# Patient Record
Sex: MALE | Race: WHITE | Employment: FULL TIME | URBAN - METROPOLITAN AREA
[De-identification: names, ages, dates, MRNs, and addresses within clinical notes are randomized per-mention and may not be internally consistent; named-entity substitution may affect disease eponyms.]

---

## 2017-03-08 DIAGNOSIS — E78.2 MIXED HYPERLIPIDEMIA: Primary | ICD-10-CM

## 2017-03-08 RX ORDER — ATORVASTATIN CALCIUM 20 MG/1
TABLET, FILM COATED ORAL
Qty: 30 TABLET | Refills: 1 | Status: SHIPPED | OUTPATIENT
Start: 2017-03-08 | End: 2017-04-21 | Stop reason: DRUGHIGH

## 2017-03-08 RX ORDER — FENOFIBRATE 160 MG/1
TABLET ORAL
Qty: 30 TABLET | Refills: 1 | Status: SHIPPED | OUTPATIENT
Start: 2017-03-08 | End: 2017-04-21 | Stop reason: SDUPTHER

## 2017-04-15 DIAGNOSIS — E78.2 MIXED HYPERLIPIDEMIA: Primary | ICD-10-CM

## 2017-04-15 DIAGNOSIS — I10 ESSENTIAL HYPERTENSION: ICD-10-CM

## 2017-04-15 DIAGNOSIS — E78.2 MIXED HYPERLIPIDEMIA: ICD-10-CM

## 2017-04-15 LAB
ALBUMIN SERPL-MCNC: 4.9 G/DL (ref 3.9–4.9)
ALP BLD-CCNC: 51 U/L (ref 35–104)
ALT SERPL-CCNC: 31 U/L (ref 0–41)
ANION GAP SERPL CALCULATED.3IONS-SCNC: 17 MEQ/L (ref 7–13)
AST SERPL-CCNC: 14 U/L (ref 0–40)
BASOPHILS ABSOLUTE: 0 K/UL (ref 0–0.2)
BASOPHILS RELATIVE PERCENT: 0.9 %
BILIRUB SERPL-MCNC: 0.3 MG/DL (ref 0–1.2)
BUN BLDV-MCNC: 14 MG/DL (ref 6–20)
CALCIUM SERPL-MCNC: 9.7 MG/DL (ref 8.6–10.2)
CHLORIDE BLD-SCNC: 102 MEQ/L (ref 98–107)
CHOLESTEROL, TOTAL: 312 MG/DL (ref 0–199)
CO2: 23 MEQ/L (ref 22–29)
CREAT SERPL-MCNC: 1.02 MG/DL (ref 0.7–1.2)
EOSINOPHILS ABSOLUTE: 0.2 K/UL (ref 0–0.7)
EOSINOPHILS RELATIVE PERCENT: 3.6 %
GFR AFRICAN AMERICAN: >60
GFR NON-AFRICAN AMERICAN: >60
GLOBULIN: 2.5 G/DL (ref 2.3–3.5)
GLUCOSE BLD-MCNC: 126 MG/DL (ref 74–109)
HCT VFR BLD CALC: 48 % (ref 42–52)
HDLC SERPL-MCNC: 37 MG/DL (ref 40–59)
HEMOGLOBIN: 16.1 G/DL (ref 14–18)
LDL CHOLESTEROL CALCULATED: ABNORMAL MG/DL (ref 0–129)
LYMPHOCYTES ABSOLUTE: 1.9 K/UL (ref 1–4.8)
LYMPHOCYTES RELATIVE PERCENT: 36.8 %
MCH RBC QN AUTO: 29.2 PG (ref 27–31.3)
MCHC RBC AUTO-ENTMCNC: 33.6 % (ref 33–37)
MCV RBC AUTO: 87 FL (ref 80–100)
MONOCYTES ABSOLUTE: 0.3 K/UL (ref 0.2–0.8)
MONOCYTES RELATIVE PERCENT: 6.4 %
NEUTROPHILS ABSOLUTE: 2.8 K/UL (ref 1.4–6.5)
NEUTROPHILS RELATIVE PERCENT: 52.3 %
PDW BLD-RTO: 13.8 % (ref 11.5–14.5)
PLATELET # BLD: 179 K/UL (ref 130–400)
POTASSIUM SERPL-SCNC: 4.5 MEQ/L (ref 3.5–5.1)
RBC # BLD: 5.52 M/UL (ref 4.7–6.1)
SODIUM BLD-SCNC: 142 MEQ/L (ref 132–144)
TOTAL PROTEIN: 7.4 G/DL (ref 6.4–8.1)
TRIGL SERPL-MCNC: 804 MG/DL (ref 0–200)
WBC # BLD: 5.3 K/UL (ref 4.8–10.8)

## 2017-04-21 ENCOUNTER — OFFICE VISIT (OUTPATIENT)
Dept: FAMILY MEDICINE CLINIC | Age: 52
End: 2017-04-21

## 2017-04-21 VITALS
WEIGHT: 247 LBS | DIASTOLIC BLOOD PRESSURE: 78 MMHG | SYSTOLIC BLOOD PRESSURE: 136 MMHG | HEART RATE: 66 BPM | RESPIRATION RATE: 18 BRPM | BODY MASS INDEX: 35.36 KG/M2 | HEIGHT: 70 IN | TEMPERATURE: 97.3 F

## 2017-04-21 DIAGNOSIS — K21.9 GASTROESOPHAGEAL REFLUX DISEASE, ESOPHAGITIS PRESENCE NOT SPECIFIED: ICD-10-CM

## 2017-04-21 DIAGNOSIS — E78.2 MIXED HYPERLIPIDEMIA: Primary | ICD-10-CM

## 2017-04-21 DIAGNOSIS — I10 ESSENTIAL HYPERTENSION: ICD-10-CM

## 2017-04-21 PROCEDURE — 99214 OFFICE O/P EST MOD 30 MIN: CPT | Performed by: FAMILY MEDICINE

## 2017-04-21 RX ORDER — FENOFIBRATE 160 MG/1
160 TABLET ORAL DAILY
Qty: 30 TABLET | Refills: 5 | Status: SHIPPED | OUTPATIENT
Start: 2017-04-21 | End: 2017-11-14 | Stop reason: SDUPTHER

## 2017-04-21 RX ORDER — ATORVASTATIN CALCIUM 40 MG/1
40 TABLET, FILM COATED ORAL DAILY
Qty: 30 TABLET | Refills: 5 | Status: SHIPPED | OUTPATIENT
Start: 2017-04-21 | End: 2017-11-14 | Stop reason: SDUPTHER

## 2017-04-21 RX ORDER — INSULIN LISPRO PROTAMIN/LISPRO 75-25/ML
80 VIAL (ML) SUBCUTANEOUS NIGHTLY
COMMUNITY
Start: 2017-03-20 | End: 2017-11-14 | Stop reason: DRUGHIGH

## 2017-04-21 RX ORDER — LISINOPRIL 10 MG/1
10 TABLET ORAL DAILY
Qty: 30 TABLET | Refills: 5 | Status: SHIPPED | OUTPATIENT
Start: 2017-04-21 | End: 2017-11-14 | Stop reason: SDUPTHER

## 2017-04-21 RX ORDER — OMEPRAZOLE 40 MG/1
40 CAPSULE, DELAYED RELEASE ORAL DAILY
Qty: 30 CAPSULE | Refills: 5 | Status: SHIPPED | OUTPATIENT
Start: 2017-04-21 | End: 2017-06-19 | Stop reason: CLARIF

## 2017-04-21 RX ORDER — DAPAGLIFLOZIN 10 MG/1
1 TABLET, FILM COATED ORAL DAILY
Refills: 3 | COMMUNITY
Start: 2017-03-06 | End: 2019-01-05 | Stop reason: SDUPTHER

## 2017-04-25 RX ORDER — ESOMEPRAZOLE MAGNESIUM 40 MG/1
40 CAPSULE, DELAYED RELEASE ORAL DAILY
Qty: 30 CAPSULE | Refills: 3 | Status: SHIPPED | OUTPATIENT
Start: 2017-04-25 | End: 2017-06-19 | Stop reason: CLARIF

## 2017-04-29 ENCOUNTER — OFFICE VISIT (OUTPATIENT)
Dept: FAMILY MEDICINE CLINIC | Age: 52
End: 2017-04-29

## 2017-04-29 VITALS
HEART RATE: 96 BPM | RESPIRATION RATE: 22 BRPM | WEIGHT: 241 LBS | BODY MASS INDEX: 34.5 KG/M2 | SYSTOLIC BLOOD PRESSURE: 120 MMHG | OXYGEN SATURATION: 98 % | HEIGHT: 70 IN | TEMPERATURE: 97.7 F | DIASTOLIC BLOOD PRESSURE: 78 MMHG

## 2017-04-29 DIAGNOSIS — H65.01 RIGHT ACUTE SEROUS OTITIS MEDIA, RECURRENCE NOT SPECIFIED: ICD-10-CM

## 2017-04-29 DIAGNOSIS — R09.81 NASAL CONGESTION: ICD-10-CM

## 2017-04-29 DIAGNOSIS — R09.82 POST-NASAL DRIP: ICD-10-CM

## 2017-04-29 DIAGNOSIS — R05.9 COUGH: ICD-10-CM

## 2017-04-29 DIAGNOSIS — H66.002 ACUTE SUPPURATIVE OTITIS MEDIA OF LEFT EAR WITHOUT SPONTANEOUS RUPTURE OF TYMPANIC MEMBRANE, RECURRENCE NOT SPECIFIED: Primary | ICD-10-CM

## 2017-04-29 PROCEDURE — 99213 OFFICE O/P EST LOW 20 MIN: CPT | Performed by: NURSE PRACTITIONER

## 2017-04-29 RX ORDER — AMOXICILLIN AND CLAVULANATE POTASSIUM 875; 125 MG/1; MG/1
1 TABLET, FILM COATED ORAL EVERY 12 HOURS
Qty: 20 TABLET | Refills: 0 | Status: SHIPPED | OUTPATIENT
Start: 2017-04-29 | End: 2017-05-09

## 2017-04-29 RX ORDER — FLUTICASONE PROPIONATE 50 MCG
2 SPRAY, SUSPENSION (ML) NASAL DAILY
Qty: 1 BOTTLE | Refills: 3 | Status: SHIPPED | OUTPATIENT
Start: 2017-04-29

## 2017-04-29 ASSESSMENT — ENCOUNTER SYMPTOMS
SORE THROAT: 0
SWOLLEN GLANDS: 0
ABDOMINAL PAIN: 0
WHEEZING: 0
SINUS PAIN: 1
NAUSEA: 0
VOMITING: 0
DIARRHEA: 0
RHINORRHEA: 1
COUGH: 1

## 2017-05-30 LAB
CHOLESTEROL, TOTAL: 167 MG/DL
CHOLESTEROL/HDL RATIO: 4.2
CREATININE, URINE: 260
HBA1C MFR BLD: 7.7 %
HCT VFR BLD CALC: 44.5 % (ref 41–53)
HDLC SERPL-MCNC: 40 MG/DL (ref 35–70)
HEMOGLOBIN: 14.4 G/DL (ref 13.5–17.5)
LDL CHOLESTEROL CALCULATED: 86 MG/DL (ref 0–160)
MICROALBUMIN/CREAT 24H UR: 14.2 MG/G{CREAT}
MICROALBUMIN/CREAT UR-RTO: 5.5
PLATELET # BLD: 171 K/ΜL
TRIGL SERPL-MCNC: 205 MG/DL
TSH SERPL DL<=0.05 MIU/L-ACNC: 2.29 UIU/ML
VLDLC SERPL CALC-MCNC: 41 MG/DL
WBC # BLD: 5 10^3/ML

## 2017-06-19 ENCOUNTER — OFFICE VISIT (OUTPATIENT)
Dept: FAMILY MEDICINE CLINIC | Age: 52
End: 2017-06-19

## 2017-06-19 VITALS
HEART RATE: 78 BPM | RESPIRATION RATE: 18 BRPM | DIASTOLIC BLOOD PRESSURE: 82 MMHG | HEIGHT: 70 IN | WEIGHT: 234.2 LBS | TEMPERATURE: 97.5 F | SYSTOLIC BLOOD PRESSURE: 122 MMHG | BODY MASS INDEX: 33.53 KG/M2

## 2017-06-19 DIAGNOSIS — E78.2 MIXED HYPERLIPIDEMIA: ICD-10-CM

## 2017-06-19 DIAGNOSIS — M77.11 LATERAL EPICONDYLITIS OF BOTH ELBOWS: Primary | ICD-10-CM

## 2017-06-19 DIAGNOSIS — M77.12 LATERAL EPICONDYLITIS OF BOTH ELBOWS: Primary | ICD-10-CM

## 2017-06-19 DIAGNOSIS — K21.9 GASTROESOPHAGEAL REFLUX DISEASE, ESOPHAGITIS PRESENCE NOT SPECIFIED: ICD-10-CM

## 2017-06-19 PROCEDURE — 99213 OFFICE O/P EST LOW 20 MIN: CPT | Performed by: FAMILY MEDICINE

## 2017-06-19 RX ORDER — LANSOPRAZOLE 30 MG/1
30 CAPSULE, DELAYED RELEASE ORAL DAILY
Qty: 30 CAPSULE | Refills: 5 | Status: SHIPPED | OUTPATIENT
Start: 2017-06-19 | End: 2017-07-06 | Stop reason: CLARIF

## 2017-06-19 RX ORDER — HYDROCODONE BITARTRATE AND ACETAMINOPHEN 5; 325 MG/1; MG/1
1-2 TABLET ORAL DAILY PRN
Qty: 60 TABLET | Refills: 0 | Status: SHIPPED | OUTPATIENT
Start: 2017-06-19 | End: 2017-11-08 | Stop reason: ALTCHOICE

## 2017-06-19 RX ORDER — ETODOLAC 400 MG/1
400 TABLET, FILM COATED ORAL 2 TIMES DAILY
Qty: 60 TABLET | Refills: 0 | Status: SHIPPED | OUTPATIENT
Start: 2017-06-19 | End: 2017-11-08 | Stop reason: ALTCHOICE

## 2017-07-06 ENCOUNTER — TELEPHONE (OUTPATIENT)
Dept: FAMILY MEDICINE CLINIC | Age: 52
End: 2017-07-06

## 2017-07-06 RX ORDER — DEXLANSOPRAZOLE 30 MG/1
30 CAPSULE, DELAYED RELEASE ORAL DAILY
Qty: 30 CAPSULE | Refills: 3 | Status: SHIPPED | OUTPATIENT
Start: 2017-07-06 | End: 2017-11-08 | Stop reason: ALTCHOICE

## 2017-11-08 ENCOUNTER — OFFICE VISIT (OUTPATIENT)
Dept: FAMILY MEDICINE CLINIC | Age: 52
End: 2017-11-08

## 2017-11-08 VITALS
RESPIRATION RATE: 16 BRPM | OXYGEN SATURATION: 93 % | SYSTOLIC BLOOD PRESSURE: 117 MMHG | BODY MASS INDEX: 33.87 KG/M2 | HEIGHT: 70 IN | HEART RATE: 91 BPM | WEIGHT: 236.6 LBS | DIASTOLIC BLOOD PRESSURE: 70 MMHG | TEMPERATURE: 97.1 F

## 2017-11-08 DIAGNOSIS — S39.012A STRAIN OF LUMBAR REGION, INITIAL ENCOUNTER: Primary | ICD-10-CM

## 2017-11-08 DIAGNOSIS — M54.31 SCIATICA OF RIGHT SIDE: ICD-10-CM

## 2017-11-08 DIAGNOSIS — M62.838 MUSCLE SPASM: ICD-10-CM

## 2017-11-08 PROCEDURE — 99213 OFFICE O/P EST LOW 20 MIN: CPT | Performed by: NURSE PRACTITIONER

## 2017-11-08 RX ORDER — CYCLOBENZAPRINE HCL 10 MG
10 TABLET ORAL 3 TIMES DAILY PRN
Qty: 45 TABLET | Refills: 0 | Status: SHIPPED | OUTPATIENT
Start: 2017-11-08 | End: 2018-02-16 | Stop reason: ALTCHOICE

## 2017-11-08 RX ORDER — PEN NEEDLE, DIABETIC 32GX 5/32"
NEEDLE, DISPOSABLE MISCELLANEOUS
Refills: 3 | COMMUNITY
Start: 2017-09-27 | End: 2018-10-17 | Stop reason: SDUPTHER

## 2017-11-08 RX ORDER — INSULIN GLARGINE 300 U/ML
INJECTION, SOLUTION SUBCUTANEOUS
Refills: 2 | COMMUNITY
Start: 2017-09-19 | End: 2017-11-14 | Stop reason: SDUPTHER

## 2017-11-08 RX ORDER — METHYLPREDNISOLONE 4 MG/1
TABLET ORAL
Qty: 1 KIT | Refills: 0 | Status: SHIPPED | OUTPATIENT
Start: 2017-11-08 | End: 2017-11-14 | Stop reason: ALTCHOICE

## 2017-11-08 NOTE — LETTER
[address] [address]  Phone: [phone]  Fax: [phone]    Hayder Judd NP        November 8, 2017     Patient: Sujey Mcdaniels   YOB: 1965   Date of Visit: 11/8/2017       To Whom it May Concern:    Linda Tubbs was seen in my clinic on 11/8/2017. He may return to work on 11/13/17 if his condition has resolved. If the condition has not improved he will be provided with additional documentation. If you have any questions or concerns, please don't hesitate to call.     Sincerely,             Hayder Judd NP

## 2017-11-08 NOTE — PATIENT INSTRUCTIONS
that reduce your pain. Try one of these positions when you lie down:  ¨ Lie on your back with your knees bent and supported by large pillows. ¨ Lie on the floor with your legs on the seat of a sofa or chair. [place] on your side with your knees and hips bent and a pillow between your legs. ¨ Lie on your stomach if it does not make pain worse. · Do not sit up in bed, and avoid soft couches and twisted positions. Bed rest can help relieve pain at first, but it delays healing. Avoid bed rest after the first day. · Change positions every 30 minutes. If you must sit for long periods of time, take breaks from sitting. Get up and walk around, or lie flat. · Try using a heating pad on a low or medium setting, for 15 to 20 minutes every 2 or 3 hours. Try a warm shower in place of one session with the heating pad. You can also buy single-use heat wraps that last up to 8 hours. You can also try ice or cold packs on your back for 10 to 20 minutes at a time, several times a day. (Put a thin cloth between the ice pack and your skin.) This reduces pain and makes it easier to be active and exercise. · Take pain medicines exactly as directed. ¨ If the doctor gave you a prescription medicine for pain, take it as prescribed. ¨ If you are not taking a prescription pain medicine, ask your doctor if you can take an over-the-counter medicine. When should you call for help? Call 911 anytime you think you may need emergency care. For example, call if:  · You are unable to move a leg at all. · You have back pain with severe belly pain. · You have symptoms of a heart attack. These may include:  ¨ Chest pain or pressure, or a strange feeling in the chest.  ¨ Sweating. ¨ Shortness of breath. ¨ Nausea or vomiting. ¨ Pain, pressure, or a strange feeling in the back, neck, jaw, or upper belly or in one or both shoulders or arms. ¨ Lightheadedness or sudden weakness. ¨ A fast or irregular heartbeat.   After you call 911, the  may tell you to chew 1 adult-strength or 2 to 4 low-dose aspirin. Wait for an ambulance. Do not try to drive yourself. Call your doctor now or seek immediate medical care if:  · You have new or worse symptoms in your arms, legs, chest, belly, or buttocks. Symptoms may include:  ¨ Numbness or tingling. ¨ Weakness. ¨ Pain. · You lose bladder or bowel control. · You have back pain and:  ¨ You have injured your back while lifting or doing some other activity. Call if the pain is severe, has not gone away after 1 or 2 days, and you cannot do your normal daily activities. ¨ You have had a back injury before that needed treatment. ¨ Your pain has lasted longer than 4 weeks. ¨ You have had weight loss you cannot explain. ¨ You are age 48 or older. ¨ You have cancer now or have had it before. Watch closely for changes in your health, and be sure to contact your doctor if you are not getting better as expected. Where can you learn more? Go to https://"".WhatsNexx. org and sign in to your BackupAgent account. Enter K091 in the Cliqset box to learn more about \"Back Pain, Emergency or Urgent Symptoms: Care Instructions. \"     If you do not have an account, please click on the \"Sign Up Now\" link. Current as of: March 20, 2017  Content Version: 11.3  © 1472-4466 NovaSys. Care instructions adapted under license by [facility] ([facility]). If you have questions about a medical condition or this instruction, always ask your healthcare professional. [place] [address] any warranty or liability for your use of this information. Patient Education        Back Care and Preventing Injuries: Care Instructions  Your Care Instructions  You can hurt your back doing many everyday activities: lifting a heavy box, bending down to garden, exercising at the gym, and even getting out of bed. But you can keep your back strong and healthy by doing some exercises.  You also can follow times.  ¨ Do pelvic tilts. Lie on your back with your knees bent. Tighten your stomach muscles. Pull your belly button (navel) in and up toward your ribs. You should feel like your back is pressing to the floor and your hips and pelvis are slightly lifting off the floor. Hold for 6 seconds while breathing smoothly. · Keep your core muscles strong. The muscles of your back, belly (abdomen), and buttocks support your spine. ¨ Pull in your belly, and imagine pulling your navel toward your spine. Hold this for 6 seconds, then relax. Remember to keep breathing normally as you tense your muscles. ¨ Do curl-ups. Always do them with your knees bent. Keep your low back on the floor, and curl your shoulders toward your knees using a smooth, slow motion. Keep your arms folded across your chest. If this bothers your neck, try putting your hands behind your neck (not your head), with your elbows spread apart. ¨ Lie on your back with your knees bent and your feet flat on the floor. Tighten your belly muscles, and then push with your feet and raise your buttocks up a few inches. Hold this position 6 seconds as you continue to breathe normally, then lower yourself slowly to the floor. Repeat 8 to 12 times. ¨ If you like group exercise, try Pilates or yoga. These classes have poses that strengthen the core muscles. Protect your back when you sit  · Place a small pillow, a rolled-up towel, or a lumbar roll in the curve of your back if you need extra support. · Sit in a chair that is low enough to let you place both feet flat on the floor with both knees nearly level with your hips. If your chair or desk is too high, use a foot rest to raise your knees. · When driving, keep your knees nearly level with your hips. Sit straight, and drive with both hands on the steering wheel. Your arms should be in a slightly bent position. · Try a kneeling chair, which helps tilt your hips forward.  This takes pressure off your lower back.  · Try sitting on an exercise ball. It can rock from side to side, which helps keep your back loose. Lift properly  · Squat down, bending at the hips and knees only. If you need to, put one knee to the floor and extend your other knee in front of you, bent at a right angle (half kneeling). · Press your chest straight forward. This helps keep your upper back straight while keeping a slight arch in your low back. · Hold the load as close to your body as possible, at the level of your navel. · Use your feet to change direction, taking small steps. · Lead with your hips as you change direction. Keep your shoulders in line with your hips as you move. Do not twist your body. · Set down your load carefully, squatting with your knees and hips only. When should you call for help? Watch closely for changes in your health, and be sure to contact your doctor if:  · You want more exercises to make your back and other core muscles stronger. Where can you learn more? Go to https://GetGoing.Nervana Systems. org and sign in to your RETC account. Enter S810 in the Laru Technologies box to learn more about \"Back Care and Preventing Injuries: Care Instructions. \"     If you do not have an account, please click on the \"Sign Up Now\" link. Current as of: March 21, 2017  Content Version: 11.3  © 0980-5521 Healthwise, Incorporated. Care instructions adapted under license by [facility] ([facility]). If you have questions about a medical condition or this instruction, always ask your healthcare professional. [place] [address] any warranty or liability for your use of this information. Patient Education        Back Stretches: Exercises  Your Care Instructions  Here are some examples of exercises for stretching your back. Start each exercise slowly. Ease off the exercise if you start to have pain.   Your doctor or physical therapist will tell you when you can start these exercises and which ones will work best for you. How to do the exercises  Overhead stretch    1. Stand comfortably with your feet shoulder-width apart. 2. Looking straight ahead, raise both arms over your head and reach toward the ceiling. Do not allow your head to tilt back. 3. Hold for 15 to 30 seconds, then lower your arms to your sides. 4. Repeat 2 to 4 times. Side stretch    1. Stand comfortably with your feet shoulder-width apart. 2. Raise one arm over your head, and then lean to the other side. 3. Slide your hand down your leg as you let the weight of your arm gently stretch your side muscles. Hold for 15 to 30 seconds. 4. Repeat 2 to 4 times on each side. Press-up    1. Lie on your stomach, supporting your body with your forearms. 2. Press your elbows down into the floor to raise your upper back. As you do this, relax your stomach muscles and allow your back to arch without using your back muscles. As your press up, do not let your hips or pelvis come off the floor. 3. Hold for 15 to 30 seconds, then relax. 4. Repeat 2 to 4 times. Relax and rest    1. Lie on your back with a rolled towel under your neck and a pillow under your knees. Extend your arms comfortably to your sides. 2. Relax and breathe normally. 3. Remain in this position for about 10 minutes. 4. If you can, do this 2 or 3 times each day. Follow-up care is a key part of your treatment and safety. Be sure to make and go to all appointments, and call your doctor if you are having problems. It's also a good idea to know your test results and keep a list of the medicines you take. Where can you learn more? Go to https://Zangopepiceweb.Krossover. org and sign in to your [employer] account. Enter N557 in the BioStable box to learn more about \"Back Stretches: Exercises. \"     If you do not have an account, please click on the \"Sign Up Now\" link. Current as of: March 21, 2017  Content Version: 11.3  © 5140-0853 ImpactMedia, Incorporated.  Care instructions whichever feels better on your lower back). 7. Keep your lower back pressed to the floor. Hold for at least 15 to 30 seconds. 8. Relax, and lower the knee to the starting position. 9. Repeat with the other leg. Repeat 2 to 4 times with each leg. 10. To get more stretch, put your other leg flat on the floor while pulling your knee to your chest.  Curl-ups    5. Lie on the floor on your back with your knees bent at a 90-degree angle. Your feet should be flat on the floor, about 12 inches from your buttocks. 6. Cross your arms over your chest. If this bothers your neck, try putting your hands behind your neck (not your head), with your elbows spread apart. 7. Slowly tighten your belly muscles and raise your shoulder blades off the floor. 8. Keep your head in line with your body, and do not press your chin to your chest.  9. Hold this position for 1 or 2 seconds, then slowly lower yourself back down to the floor. 10. Repeat 8 to 12 times. Pelvic tilt exercise    1. Lie on your back with your knees bent. 2. \"Brace\" your stomach. This means to tighten your muscles by pulling in and imagining your belly button moving toward your spine. You should feel like your back is pressing to the floor and your hips and pelvis are rocking back. 3. Hold for about 6 seconds while you breathe smoothly. 4. Repeat 8 to 12 times. Heel dig bridging    1. Lie on your back with both knees bent and your ankles bent so that only your heels are digging into the floor. Your knees should be bent about 90 degrees. 2. Then push your heels into the floor, squeeze your buttocks, and lift your hips off the floor until your shoulders, hips, and knees are all in a straight line. 3. Hold for about 6 seconds as you continue to breathe normally, and then slowly lower your hips back down to the floor and rest for up to 10 seconds. 4. Do 8 to 12 repetitions. Hamstring stretch in doorway    1.  Lie on your back in a doorway, with one leg through have an account, please click on the \"Sign Up Now\" link. Current as of: March 21, 2017  Content Version: 11.3  © 4794-4762 CodeSealer. Care instructions adapted under license by [facility] ([facility]). If you have questions about a medical condition or this instruction, always ask your healthcare professional. [address] any warranty or liability for your use of this information. Patient Education        Sciatica: Exercises  Your Care Instructions  Here are some examples of typical rehabilitation exercises for your condition. Start each exercise slowly. Ease off the exercise if you start to have pain. Your doctor or physical therapist will tell you when you can start these exercises and which ones will work best for you. When you are not being active, find a comfortable position for rest. Some people are comfortable on the floor or a medium-firm bed with a small pillow under their head and another under their knees. Some people prefer to lie on their side with a pillow between their knees. Don't stay in one position for too long. Take short walks (10 to 20 minutes) every 2 to 3 hours. Avoid slopes, hills, and stairs until you feel better. Walk only distances you can manage without pain, especially leg pain. How to do the exercises  Back stretches    15. Get down on your hands and knees on the floor. 16. Relax your head and allow it to droop. Round your back up toward the ceiling until you feel a nice stretch in your upper, middle, and lower back. Hold this stretch for as long as it feels comfortable, or about 15 to 30 seconds. 17. Return to the starting position with a flat back while you are on your hands and knees. 18. Let your back sway by pressing your stomach toward the floor. Lift your buttocks toward the ceiling. 19. Hold this position for 15 to 30 seconds. 20. Repeat 2 to 4 times. Follow-up care is a key part of your treatment and safety.  Be sure to make and go to all appointments, and call your doctor if you are having problems. It's also a good idea to know your test results and keep a list of the medicines you take. Where can you learn more? Go to https://WiLinxcecilyeb.Coinsetter. org and sign in to your Elasticsearch account. Enter K061 in the [facility] box to learn more about \"Sciatica: Exercises. \"     If you do not have an account, please click on the \"Sign Up Now\" link. Current as of: March 21, 2017  Content Version: 11.3  © 6372-3329 Survata, Incorporated. Care instructions adapted under license by [facility] ([facility]). If you have questions about a medical condition or this instruction, always ask your healthcare professional. [address] any warranty or liability for your use of this information.

## 2017-11-09 ENCOUNTER — TELEPHONE (OUTPATIENT)
Dept: FAMILY MEDICINE CLINIC | Age: 52
End: 2017-11-09

## 2017-11-09 ASSESSMENT — ENCOUNTER SYMPTOMS
EYE PAIN: 0
CONSTIPATION: 0
SWOLLEN GLANDS: 0
NAUSEA: 0
SHORTNESS OF BREATH: 0
ABDOMINAL PAIN: 0
DIARRHEA: 0
WHEEZING: 0
VISUAL CHANGE: 0

## 2017-11-10 NOTE — PROGRESS NOTES
Subjective  Yaritza Del Rosario, 50 y.o. male presents today with:  Chief Complaint   Patient presents with    Muscle Pain     Pt c/o muscle pain R side lower back pain goes down toward hip, pulled muscle from lifting, difficult to walk, x5days, taking ibuprofen       Muscle Pain   This is a new problem. The current episode started in the past 7 days. The problem occurs constantly. The problem has been gradually worsening since onset. The pain occurs in the context of recent physical stress (Patient was helping his children move and was carrying boxes and furniture). The pain is present in the lower back and left hip. The pain is severe. The symptoms are aggravated by any movement. Associated symptoms include stiffness and weakness. Pertinent negatives include no abdominal pain, chest pain, constipation, diarrhea, eye pain, fever, headaches, joint swelling, nausea, rash, sensory change, shortness of breath, swollen glands, urinary symptoms, visual change or wheezing. Past treatments include acetaminophen, OTC NSAID, rest, cold pack and heat pack. The treatment provided no relief. There is no swelling present. Objective    Vitals:    11/08/17 1811   BP: 117/70   Site: Left Arm   Position: Sitting   Cuff Size: Medium Adult   Pulse: 91   Resp: 16   Temp: 97.1 °F (36.2 °C)   TempSrc: Temporal   SpO2: 93%   Weight: 236 lb 9.6 oz (107.3 kg)   Height: 5' 10\" (1.778 m)       Physical Exam   Constitutional: He is oriented to person, place, and time. He appears well-developed and well-nourished. HENT:   Head: Normocephalic and atraumatic. Eyes: Conjunctivae and EOM are normal.   Neck: Normal range of motion. Pulmonary/Chest: Effort normal.   Musculoskeletal:        Lumbar back: He exhibits decreased range of motion (patient isn't able to bend more than 30 degrees without a great deal of pain), tenderness, pain and spasm (heavy spasm noted).         Back:    When walking patient has an abnormal gait and shoulders are angled and body put in abnormal position for patient to walk pain free. Neurological: He is alert and oriented to person, place, and time. Skin: Skin is warm and dry. Psychiatric: He has a normal mood and affect. Assessment & Plan   1. Strain of lumbar region, initial encounter  methylPREDNISolone (MEDROL, EMILE,) 4 MG tablet    cyclobenzaprine (FLEXERIL) 10 MG tablet    traMADol-acetaminophen (ULTRACET) 37.5-325 MG per tablet    Symptomatic, RXs given. Pt to have close follow up. Parameters on when to call and when to ER given. If no improve will need XR   2. Sciatica of right side  methylPREDNISolone (MEDROL, EMILE,) 4 MG tablet    cyclobenzaprine (FLEXERIL) 10 MG tablet    traMADol-acetaminophen (ULTRACET) 37.5-325 MG per tablet    see 1   3. Muscle spasm  methylPREDNISolone (MEDROL, EMILE,) 4 MG tablet    cyclobenzaprine (FLEXERIL) 10 MG tablet    traMADol-acetaminophen (ULTRACET) 37.5-325 MG per tablet    see 1       Return in about 1 week (around 11/15/2017) for back strain. Reviewed with the patient: current clinical status, medications, activities and diet. Side effects, adverse effects of the medication prescribed today, as well as treatment plan/ rationale and result expectations have been discussed with the patient who expresses understanding and desires to proceed. Close follow up to evaluate treatment results and for coordination of care. I have reviewed the patient's medical history in detail and updated the computerized patient record.     Ramin Young NP

## 2017-11-13 ENCOUNTER — TELEPHONE (OUTPATIENT)
Dept: FAMILY MEDICINE CLINIC | Age: 52
End: 2017-11-13

## 2017-11-13 NOTE — TELEPHONE ENCOUNTER
PATIENT CALLED STATING HE SEEN NP Thomas Alves ON 11/8/17 AND WAS GIVEN A WORK EXCUSE TO RETURN TO WORK TODAY, BUT PATIENT STATES HE WAS NOT ABLE TO RETURN TODAY AND IS REQUESTING A NEW NOTE STATES HE CAN RETURN ON 11/14/17.  PLEASE ADVISE

## 2017-11-14 ENCOUNTER — OFFICE VISIT (OUTPATIENT)
Dept: FAMILY MEDICINE CLINIC | Age: 52
End: 2017-11-14

## 2017-11-14 VITALS
RESPIRATION RATE: 18 BRPM | BODY MASS INDEX: 33.82 KG/M2 | TEMPERATURE: 97.3 F | HEIGHT: 70 IN | HEART RATE: 76 BPM | SYSTOLIC BLOOD PRESSURE: 122 MMHG | DIASTOLIC BLOOD PRESSURE: 64 MMHG | WEIGHT: 236.2 LBS

## 2017-11-14 DIAGNOSIS — E78.2 MIXED HYPERLIPIDEMIA: ICD-10-CM

## 2017-11-14 DIAGNOSIS — M54.50 ACUTE BILATERAL LOW BACK PAIN WITHOUT SCIATICA: ICD-10-CM

## 2017-11-14 DIAGNOSIS — S39.012D STRAIN OF LUMBAR REGION, SUBSEQUENT ENCOUNTER: ICD-10-CM

## 2017-11-14 DIAGNOSIS — E11.9 TYPE 2 DIABETES MELLITUS WITHOUT COMPLICATION, WITHOUT LONG-TERM CURRENT USE OF INSULIN (HCC): ICD-10-CM

## 2017-11-14 DIAGNOSIS — I10 ESSENTIAL HYPERTENSION: Primary | ICD-10-CM

## 2017-11-14 DIAGNOSIS — K21.9 GASTROESOPHAGEAL REFLUX DISEASE, ESOPHAGITIS PRESENCE NOT SPECIFIED: ICD-10-CM

## 2017-11-14 PROCEDURE — 99214 OFFICE O/P EST MOD 30 MIN: CPT | Performed by: FAMILY MEDICINE

## 2017-11-14 RX ORDER — INSULIN GLARGINE 300 U/ML
40 INJECTION, SOLUTION SUBCUTANEOUS EVERY MORNING
COMMUNITY
Start: 2017-11-14 | End: 2018-02-16 | Stop reason: CLARIF

## 2017-11-14 RX ORDER — INSULIN LISPRO PROTAMIN/LISPRO 75-25/ML
50 VIAL (ML) SUBCUTANEOUS
Qty: 1 VIAL | Status: SHIPPED | COMMUNITY
Start: 2017-11-14 | End: 2018-03-27 | Stop reason: DRUGHIGH

## 2017-11-14 RX ORDER — FENOFIBRATE 160 MG/1
160 TABLET ORAL DAILY
Qty: 30 TABLET | Refills: 5 | Status: SHIPPED | OUTPATIENT
Start: 2017-11-14 | End: 2018-06-26 | Stop reason: SDUPTHER

## 2017-11-14 RX ORDER — ATORVASTATIN CALCIUM 40 MG/1
40 TABLET, FILM COATED ORAL DAILY
Qty: 30 TABLET | Refills: 5 | Status: SHIPPED | OUTPATIENT
Start: 2017-11-14 | End: 2018-06-26 | Stop reason: SDUPTHER

## 2017-11-14 RX ORDER — LISINOPRIL 10 MG/1
10 TABLET ORAL DAILY
Qty: 30 TABLET | Refills: 5 | Status: SHIPPED | OUTPATIENT
Start: 2017-11-14 | End: 2018-06-08 | Stop reason: SDUPTHER

## 2017-11-14 ASSESSMENT — PATIENT HEALTH QUESTIONNAIRE - PHQ9
2. FEELING DOWN, DEPRESSED OR HOPELESS: 0
SUM OF ALL RESPONSES TO PHQ QUESTIONS 1-9: 0
SUM OF ALL RESPONSES TO PHQ9 QUESTIONS 1 & 2: 0
1. LITTLE INTEREST OR PLEASURE IN DOING THINGS: 0

## 2017-11-14 NOTE — PROGRESS NOTES
wheezing  Cardiovascular ROS: no chest pain or dyspnea on exertion  Gastrointestinal ROS: no abdominal pain, change in bowel habits, or black or bloody stools  Genito-Urinary ROS: no dysuria, trouble voiding, or hematuria  Musculoskeletal ROS: as noted in HPI  Neurological ROS: negative for - dizziness, gait disturbance, headaches, impaired coordination/balance, numbness/tingling, tremors or visual changes  Dermatological ROS: negative for - dry skin, lumps, pruritus or rash            Blood pressure 122/64, pulse 76, temperature 97.3 °F (36.3 °C), temperature source Temporal, resp. rate 18, height 5' 10\" (1.778 m), weight 236 lb 3.2 oz (107.1 kg).     Physical Examination: General appearance - alert, well appearing, and in no distress  Mental status - alert, oriented to person, place, and time  Eyes - pupils equal and reactive, extraocular eye movements intact  Ears - bilateral TM's and external ear canals normal  Mouth - mucous membranes moist, pharynx normal without lesions  Neck - supple, no significant adenopathy  Lymphatics - no palpable lymphadenopathy, no hepatosplenomegaly  Chest - clear to auscultation, no wheezes, rales or rhonchi, symmetric air entry  Heart - normal rate, regular rhythm, normal S1, S2, no murmurs, rubs, clicks or gallops  Abdomen - soft, nontender, nondistended, no masses or organomegaly  Neurological - alert, oriented, normal speech, no focal findings or movement disorder noted  Musculoskeletal - no joint tenderness, deformity or swelling        Orders Only on 06/19/2017   Component Date Value Ref Range Status    Cholesterol, Total 05/30/2017 167  mg/dL Final    HDL 05/30/2017 40  35 - 70 mg/dL Final    LDL Calculated 05/30/2017 86  0 - 160 mg/dL Final    Triglycerides 05/30/2017 205  mg/dL Final    Chol/HDL Ratio 05/30/2017 4.2   Final    VLDL 05/30/2017 41  mg/dL Final    WBC 05/30/2017 5.0  10^3/mL Final    Hemoglobin 05/30/2017 14.4  13.5 - 17.5 g/dL Final    Hematocrit 05/30/2017 44.5  41 - 53 % Final    Platelets 22/55/6705 171  K/µL Final    Hemoglobin A1C 05/30/2017 7.7  % Final    TSH 05/30/2017 2.29  uIU/mL Final    Microalb Creat Ratio 05/30/2017 5.5   Final    Microalb, Ur 05/30/2017 14.2   Final    Creatinine, Urine 05/30/2017 260   Final            Encounter Diagnoses   Name Primary?     Mixed hyperlipidemia     Essential hypertension Yes    Gastroesophageal reflux disease, esophagitis presence not specified     Type 2 diabetes mellitus without complication, without long-term current use of insulin (HCC)     Acute bilateral low back pain without sciatica     Strain of lumbar region, subsequent encounter             Plan:     Orders Placed This Encounter   Procedures    Comprehensive Metabolic Panel     Standing Status:   Future     Standing Expiration Date:   11/14/2018    Lipid Panel     Standing Status:   Future     Standing Expiration Date:   11/14/2018     Order Specific Question:   Is Patient Fasting?/# of Hours     Answer:   8-10HRS    CBC Auto Differential     Standing Status:   Future     Standing Expiration Date:   11/14/2018    Hemoglobin A1C     Standing Status:   Future     Standing Expiration Date:   11/14/2018     Outpatient Encounter Prescriptions as of 11/14/2017   Medication Sig Dispense Refill    atorvastatin (LIPITOR) 40 MG tablet Take 1 tablet by mouth daily 30 tablet 5    lisinopril (PRINIVIL;ZESTRIL) 10 MG tablet Take 1 tablet by mouth daily 30 tablet 5    metoprolol tartrate (LOPRESSOR) 25 MG tablet Take 1 tablet by mouth 2 times daily 60 tablet 5    fenofibrate 160 MG tablet Take 1 tablet by mouth daily 30 tablet 5    HUMALOG MIX 75/25 (75-25) 100 UNIT/ML SUSP injection vial 50 Units 1 vial     TOUJEO SOLOSTAR 300 UNIT/ML injection pen Inject 40 Units into the skin every morning      BD PEN NEEDLE CARLA U/F 32G X 4 MM MISC TEST THREE TIMES DAILY  3    cyclobenzaprine (FLEXERIL) 10 MG tablet Take 1 tablet by mouth 3 times daily aware of his LDL target goal given his cardiovascular risk analysis. I have discussed the appropriate diet. The need for lifelong compliance in order to reduce risk is stressed. A regular exercise program is recommended to help achieve and maintain normal body weight, fitness and improve lipid balance. A written copy of a low fat, low cholesterol diet has been given to the patient.

## 2017-11-25 DIAGNOSIS — E78.2 MIXED HYPERLIPIDEMIA: ICD-10-CM

## 2017-11-25 DIAGNOSIS — E11.9 TYPE 2 DIABETES MELLITUS WITHOUT COMPLICATION, WITHOUT LONG-TERM CURRENT USE OF INSULIN (HCC): ICD-10-CM

## 2017-11-25 DIAGNOSIS — I10 ESSENTIAL HYPERTENSION: ICD-10-CM

## 2017-11-25 LAB
ALBUMIN SERPL-MCNC: 4.9 G/DL (ref 3.9–4.9)
ALP BLD-CCNC: 46 U/L (ref 35–104)
ALT SERPL-CCNC: 34 U/L (ref 0–41)
ANION GAP SERPL CALCULATED.3IONS-SCNC: 16 MEQ/L (ref 7–13)
AST SERPL-CCNC: 17 U/L (ref 0–40)
BASOPHILS ABSOLUTE: 0 K/UL (ref 0–0.2)
BASOPHILS RELATIVE PERCENT: 0.7 %
BILIRUB SERPL-MCNC: 0.3 MG/DL (ref 0–1.2)
BUN BLDV-MCNC: 16 MG/DL (ref 6–20)
CALCIUM SERPL-MCNC: 9.5 MG/DL (ref 8.6–10.2)
CHLORIDE BLD-SCNC: 98 MEQ/L (ref 98–107)
CHOLESTEROL, TOTAL: 215 MG/DL (ref 0–199)
CO2: 25 MEQ/L (ref 22–29)
CREAT SERPL-MCNC: 0.92 MG/DL (ref 0.7–1.2)
EOSINOPHILS ABSOLUTE: 0.1 K/UL (ref 0–0.7)
EOSINOPHILS RELATIVE PERCENT: 2.7 %
GFR AFRICAN AMERICAN: >60
GFR NON-AFRICAN AMERICAN: >60
GLOBULIN: 2.3 G/DL (ref 2.3–3.5)
GLUCOSE BLD-MCNC: 144 MG/DL (ref 74–109)
HBA1C MFR BLD: 7.4 % (ref 4.8–5.9)
HCT VFR BLD CALC: 47.5 % (ref 42–52)
HDLC SERPL-MCNC: 39 MG/DL (ref 40–59)
HEMOGLOBIN: 16 G/DL (ref 14–18)
LDL CHOLESTEROL CALCULATED: ABNORMAL MG/DL (ref 0–129)
LYMPHOCYTES ABSOLUTE: 1.7 K/UL (ref 1–4.8)
LYMPHOCYTES RELATIVE PERCENT: 35.2 %
MCH RBC QN AUTO: 30 PG (ref 27–31.3)
MCHC RBC AUTO-ENTMCNC: 33.8 % (ref 33–37)
MCV RBC AUTO: 88.9 FL (ref 80–100)
MONOCYTES ABSOLUTE: 0.4 K/UL (ref 0.2–0.8)
MONOCYTES RELATIVE PERCENT: 7.4 %
NEUTROPHILS ABSOLUTE: 2.6 K/UL (ref 1.4–6.5)
NEUTROPHILS RELATIVE PERCENT: 54 %
PDW BLD-RTO: 13.5 % (ref 11.5–14.5)
PLATELET # BLD: 183 K/UL (ref 130–400)
POTASSIUM SERPL-SCNC: 4.6 MEQ/L (ref 3.5–5.1)
RBC # BLD: 5.35 M/UL (ref 4.7–6.1)
SODIUM BLD-SCNC: 139 MEQ/L (ref 132–144)
TOTAL PROTEIN: 7.2 G/DL (ref 6.4–8.1)
TRIGL SERPL-MCNC: 421 MG/DL (ref 0–200)
WBC # BLD: 4.8 K/UL (ref 4.8–10.8)

## 2017-11-28 RX ORDER — GLIPIZIDE 10 MG/1
TABLET ORAL
Qty: 30 TABLET | Refills: 0 | Status: SHIPPED | OUTPATIENT
Start: 2017-11-28 | End: 2018-02-16 | Stop reason: SDUPTHER

## 2017-12-29 ENCOUNTER — OFFICE VISIT (OUTPATIENT)
Dept: FAMILY MEDICINE CLINIC | Age: 52
End: 2017-12-29

## 2017-12-29 VITALS
WEIGHT: 242 LBS | HEART RATE: 70 BPM | TEMPERATURE: 98.4 F | BODY MASS INDEX: 34.72 KG/M2 | RESPIRATION RATE: 14 BRPM | SYSTOLIC BLOOD PRESSURE: 126 MMHG | DIASTOLIC BLOOD PRESSURE: 86 MMHG

## 2017-12-29 DIAGNOSIS — R14.0 ABDOMINAL BLOATING: ICD-10-CM

## 2017-12-29 DIAGNOSIS — R14.3 FLATULENCE: Primary | ICD-10-CM

## 2017-12-29 PROCEDURE — 99212 OFFICE O/P EST SF 10 MIN: CPT | Performed by: NURSE PRACTITIONER

## 2017-12-29 ASSESSMENT — ENCOUNTER SYMPTOMS
CONSTIPATION: 0
RESPIRATORY NEGATIVE: 1
BLOOD IN STOOL: 0
ABDOMINAL DISTENTION: 1
VOMITING: 0
NAUSEA: 0
DIARRHEA: 0
ABDOMINAL PAIN: 0

## 2017-12-29 NOTE — PROGRESS NOTES
Patient present today states that at night he has gas and his stomach growls. No abdominal discomfort or pain.

## 2017-12-29 NOTE — PROGRESS NOTES
Dispense Refill    glipiZIDE (GLUCOTROL) 10 MG tablet TAKE ONE TABLET AT NIGHT BY MOUTH 30 tablet 0    metFORMIN (GLUCOPHAGE) 500 MG tablet Take 2 tablets by mouth 2 times daily (with meals) 120 tablet 0    atorvastatin (LIPITOR) 40 MG tablet Take 1 tablet by mouth daily 30 tablet 5    lisinopril (PRINIVIL;ZESTRIL) 10 MG tablet Take 1 tablet by mouth daily 30 tablet 5    metoprolol tartrate (LOPRESSOR) 25 MG tablet Take 1 tablet by mouth 2 times daily 60 tablet 5    fenofibrate 160 MG tablet Take 1 tablet by mouth daily 30 tablet 5    HUMALOG MIX 75/25 (75-25) 100 UNIT/ML SUSP injection vial 50 Units 1 vial     TOUJEO SOLOSTAR 300 UNIT/ML injection pen Inject 40 Units into the skin every morning      BD PEN NEEDLE CARLA U/F 32G X 4 MM MISC TEST THREE TIMES DAILY  3    cyclobenzaprine (FLEXERIL) 10 MG tablet Take 1 tablet by mouth 3 times daily as needed for Muscle spasms 45 tablet 0    fluticasone (FLONASE) 50 MCG/ACT nasal spray 2 sprays by Nasal route daily 1 Bottle 3    FARXIGA 10 MG tablet Take 1 tablet by mouth daily  3    nitroGLYCERIN (NITROSTAT) 0.4 MG SL tablet Place 0.4 mg under the tongue as needed      ONE TOUCH ULTRA TEST strip Test tid as directed      aspirin 81 MG tablet Take 81 mg by mouth daily.  polyethylene glycol (MIRALAX) powder Take 17 g by mouth daily. 1 Bottle 0     No current facility-administered medications on file prior to visit. Allergies:  Review of patient's allergies indicates no known allergies. Review of Systems   Constitutional: Negative for appetite change, chills, diaphoresis, fatigue, fever and unexpected weight change. Respiratory: Negative. Gastrointestinal: Positive for abdominal distention. Negative for abdominal pain, blood in stool, constipation, diarrhea, nausea and vomiting. Genitourinary: Negative. Musculoskeletal: Negative.         Objective:   /86 (Site: Left Arm, Position: Sitting, Cuff Size: Large Adult)   Pulse 70 Temp 98.4 °F (36.9 °C) (Temporal)   Resp 14   Wt 242 lb (109.8 kg)   BMI 34.72 kg/m²     Physical Exam   Constitutional: He is oriented to person, place, and time. He appears well-developed and well-nourished. Cardiovascular: Normal rate. Pulmonary/Chest: Effort normal.   Abdominal: Soft. Bowel sounds are increased. There is no tenderness. There is no rigidity, no rebound and no guarding. Musculoskeletal: He exhibits no edema. Neurological: He is alert and oriented to person, place, and time. Skin: Skin is warm and dry. Assessment:     1. Flatulence     2. Abdominal bloating           Plan:      No orders of the defined types were placed in this encounter. No orders of the defined types were placed in this encounter. Discussed that there are many causes for increased gas and bloating, such as, diet, lack of exercise, bacterial overgrowth of intestines, IBS. Recommend he avoid gassy foods, such as, cabbage, onions, cruciferous veggies. Avoid dairy. Continue beano/gas-x. Start on probiotic, such as, culturelle. Give me a call in a week and let me know how he is doing. If still having symptoms or worsening, may do some imaging or start on antibiotic for bacterial overgrowth. Side effects, adverse effects of the medication prescribed today, as well as treatment plan and result expectations have been discussed with the patient who expresses understanding and desires to proceed.     Close follow up to evaluate treatment results and for coordination of care. I have reviewed the patient's medical history in detail and updated the computerized patient record. Return in about 1 week (around 1/5/2018) for f/u if not getting better.     Loretta Ledezma NP

## 2018-01-05 ENCOUNTER — TELEPHONE (OUTPATIENT)
Dept: FAMILY MEDICINE CLINIC | Age: 53
End: 2018-01-05

## 2018-01-05 RX ORDER — DICYCLOMINE HYDROCHLORIDE 10 MG/1
10 CAPSULE ORAL
Qty: 120 CAPSULE | Refills: 3 | Status: SHIPPED | OUTPATIENT
Start: 2018-01-05 | End: 2018-01-18 | Stop reason: ALTCHOICE

## 2018-01-18 ENCOUNTER — OFFICE VISIT (OUTPATIENT)
Dept: FAMILY MEDICINE CLINIC | Age: 53
End: 2018-01-18

## 2018-01-18 VITALS
DIASTOLIC BLOOD PRESSURE: 84 MMHG | HEART RATE: 92 BPM | SYSTOLIC BLOOD PRESSURE: 138 MMHG | HEIGHT: 70 IN | RESPIRATION RATE: 16 BRPM | TEMPERATURE: 97.2 F | BODY MASS INDEX: 34.47 KG/M2 | WEIGHT: 240.8 LBS

## 2018-01-18 DIAGNOSIS — E78.2 MIXED HYPERLIPIDEMIA: ICD-10-CM

## 2018-01-18 DIAGNOSIS — B96.89 ACUTE BACTERIAL SINUSITIS: ICD-10-CM

## 2018-01-18 DIAGNOSIS — J01.90 ACUTE BACTERIAL SINUSITIS: ICD-10-CM

## 2018-01-18 DIAGNOSIS — K58.9 IRRITABLE BOWEL SYNDROME WITHOUT DIARRHEA: ICD-10-CM

## 2018-01-18 DIAGNOSIS — I10 ESSENTIAL HYPERTENSION: ICD-10-CM

## 2018-01-18 DIAGNOSIS — R05.9 COUGH: ICD-10-CM

## 2018-01-18 DIAGNOSIS — R19.04 LLQ ABDOMINAL MASS: ICD-10-CM

## 2018-01-18 DIAGNOSIS — R10.84 GENERALIZED ABDOMINAL PAIN: Primary | ICD-10-CM

## 2018-01-18 PROCEDURE — 99214 OFFICE O/P EST MOD 30 MIN: CPT | Performed by: FAMILY MEDICINE

## 2018-01-18 RX ORDER — HYOSCYAMINE SULFATE EXTENDED-RELEASE 0.38 MG/1
375 TABLET ORAL EVERY 12 HOURS PRN
Qty: 60 TABLET | Refills: 0 | Status: SHIPPED | OUTPATIENT
Start: 2018-01-18 | End: 2018-02-22 | Stop reason: SDUPTHER

## 2018-01-18 RX ORDER — CEFUROXIME AXETIL 250 MG/1
250 TABLET ORAL 2 TIMES DAILY
Qty: 20 TABLET | Refills: 0 | Status: SHIPPED | OUTPATIENT
Start: 2018-01-18 | End: 2018-01-28

## 2018-02-05 ENCOUNTER — HOSPITAL ENCOUNTER (OUTPATIENT)
Dept: CT IMAGING | Age: 53
Discharge: HOME | End: 2018-02-07
Payer: COMMERCIAL

## 2018-02-05 VITALS — DIASTOLIC BLOOD PRESSURE: 87 MMHG | SYSTOLIC BLOOD PRESSURE: 132 MMHG | HEART RATE: 88 BPM

## 2018-02-05 DIAGNOSIS — R19.04 LLQ ABDOMINAL MASS: ICD-10-CM

## 2018-02-05 PROCEDURE — 2500000003 HC RX 250 WO HCPCS: Performed by: FAMILY MEDICINE

## 2018-02-05 PROCEDURE — 74177 CT ABD & PELVIS W/CONTRAST: CPT

## 2018-02-05 PROCEDURE — 6360000004 HC RX CONTRAST MEDICATION: Performed by: FAMILY MEDICINE

## 2018-02-05 RX ADMIN — IOVERSOL 100 ML: 678 INJECTION INTRA-ARTERIAL; INTRAVENOUS at 14:23

## 2018-02-05 RX ADMIN — BARIUM SULFATE 450 ML: 21 SUSPENSION ORAL at 13:20

## 2018-02-16 ENCOUNTER — OFFICE VISIT (OUTPATIENT)
Dept: FAMILY MEDICINE CLINIC | Age: 53
End: 2018-02-16
Payer: COMMERCIAL

## 2018-02-16 VITALS
RESPIRATION RATE: 18 BRPM | SYSTOLIC BLOOD PRESSURE: 118 MMHG | DIASTOLIC BLOOD PRESSURE: 60 MMHG | HEIGHT: 70 IN | TEMPERATURE: 97.5 F | BODY MASS INDEX: 34.44 KG/M2 | HEART RATE: 80 BPM | WEIGHT: 240.6 LBS

## 2018-02-16 DIAGNOSIS — E78.2 MIXED HYPERLIPIDEMIA: ICD-10-CM

## 2018-02-16 DIAGNOSIS — K58.0 IRRITABLE BOWEL SYNDROME WITH DIARRHEA: ICD-10-CM

## 2018-02-16 DIAGNOSIS — K21.9 GASTROESOPHAGEAL REFLUX DISEASE, ESOPHAGITIS PRESENCE NOT SPECIFIED: ICD-10-CM

## 2018-02-16 DIAGNOSIS — K76.0 HEPATIC STEATOSIS: ICD-10-CM

## 2018-02-16 DIAGNOSIS — E11.9 TYPE 2 DIABETES MELLITUS WITHOUT COMPLICATION, WITH LONG-TERM CURRENT USE OF INSULIN (HCC): Primary | ICD-10-CM

## 2018-02-16 DIAGNOSIS — I10 ESSENTIAL HYPERTENSION: ICD-10-CM

## 2018-02-16 DIAGNOSIS — Z79.4 TYPE 2 DIABETES MELLITUS WITHOUT COMPLICATION, WITH LONG-TERM CURRENT USE OF INSULIN (HCC): Primary | ICD-10-CM

## 2018-02-16 PROCEDURE — 99214 OFFICE O/P EST MOD 30 MIN: CPT | Performed by: FAMILY MEDICINE

## 2018-02-16 RX ORDER — GLIPIZIDE 10 MG/1
TABLET ORAL
Qty: 60 TABLET | Refills: 2 | Status: SHIPPED | OUTPATIENT
Start: 2018-02-16 | End: 2018-06-26 | Stop reason: SDUPTHER

## 2018-02-16 NOTE — PATIENT INSTRUCTIONS
this information. Patient Education        Type 2 Diabetes: Care Instructions  Your Care Instructions    Type 2 diabetes is a disease that develops when the body's tissues cannot use insulin properly. Over time, the pancreas cannot make enough insulin. Insulin is a hormone that helps the body's cells use sugar (glucose) for energy. It also helps the body store extra sugar in muscle, fat, and liver cells. Without insulin, the sugar cannot get into the cells to do its work. It stays in the blood instead. This can cause high blood sugar levels. A person has diabetes when the blood sugar stays too high too much of the time. Over time, diabetes can lead to diseases of the heart, blood vessels, nerves, kidneys, and eyes. You may be able to control your blood sugar by losing weight, eating a healthy diet, and getting daily exercise. You may also have to take insulin or other diabetes medicine. Follow-up care is a key part of your treatment and safety. Be sure to make and go to all appointments. Call your doctor if you are having problems. It's also a good idea to know your test results and keep a list of the medicines you take. How can you care for yourself at home? · Keep your blood sugar at a target level (which you set with your doctor). ¨ Eat a good diet that spreads carbohydrate throughout the day. Carbohydrate-the body's main source of fuel-affects blood sugar more than any other nutrient. Carbohydrate is in fruits, vegetables, milk, and yogurt. It also is in breads, cereals, vegetables such as potatoes and corn, and sugary foods such as candy and cakes. ¨ Aim for 30 minutes of exercise on most, preferably all, days of the week. Walking is a good choice. You also may want to do other activities, such as running, swimming, cycling, or playing tennis or team sports. If your doctor says it's okay, do muscle-strengthening exercises at least 2 times a week. ¨ Take your medicines exactly as prescribed.  Call is a good choice. You also may want to do other activities, such as running, swimming, cycling, or playing tennis or team sports. · Limit alcohol, or do not drink. Alcohol can damage the liver and cause health problems. When should you call for help? Call 911 anytime you think you may need emergency care. For example, call if:  ? · You have trouble breathing. ? · You vomit blood or what looks like coffee grounds. ?Call your doctor now or seek immediate medical care if:  ? · You feel very sleepy or confused. ? · You have new or worse belly pain. ? · You have a fever. ? · There is a new or increasing yellow tint to your skin or the whites of your eyes. ? · You have any abnormal bleeding, such as:  ¨ Nosebleeds. ¨ Vaginal bleeding that is different (heavier, more frequent, at a different time of the month) than what you are used to. ¨ Bloody or black stools, or rectal bleeding. ¨ Bloody or pink urine. ? Watch closely for changes in your health, and be sure to contact your doctor if:  ? · Your belly is getting bigger. ? · You are gaining weight. ? · You have any problems. Where can you learn more? Go to https://WormholepeaCommerceeb.BuzzFeed. org and sign in to your [employer] account. Enter G779 in the [employer] box to learn more about \"Nonalcoholic Steatohepatitis (URBINA): Care Instructions. \"     If you do not have an account, please click on the \"Sign Up Now\" link. Current as of: May 12, 2017  Content Version: 11.5  © 1753-6941 Healthwise, Incorporated. Care instructions adapted under license by [facility] ([facility]). If you have questions about a medical condition or this instruction, always ask your healthcare professional. [place] [address] any warranty or liability for your use of this information.

## 2018-02-16 NOTE — PROGRESS NOTES
Pressure Other      Social History     Social History    Marital status: Single     Spouse name: N/A    Number of children: N/A    Years of education: N/A     Social History Main Topics    Smoking status: Never Smoker    Smokeless tobacco: Never Used    Alcohol use No    Drug use: No    Sexual activity: Not Asked     Other Topics Concern    None     Social History Narrative    None     Current Outpatient Prescriptions   Medication Sig Dispense Refill    insulin glargine (LANTUS SOLOSTAR) 100 UNIT/ML injection pen Inject 50 Units into the skin daily 15 mL 2    metFORMIN (GLUCOPHAGE) 500 MG tablet Take 2 tablets by mouth 2 times daily (with meals) 120 tablet 2    glipiZIDE (GLUCOTROL) 10 MG tablet TAKE ONE TABLET AT NIGHT BY MOUTH 60 tablet 2    hyoscyamine (LEVBID) 375 MCG extended release tablet Take 1 tablet by mouth every 12 hours as needed for Cramping 60 tablet 0    atorvastatin (LIPITOR) 40 MG tablet Take 1 tablet by mouth daily 30 tablet 5    lisinopril (PRINIVIL;ZESTRIL) 10 MG tablet Take 1 tablet by mouth daily 30 tablet 5    metoprolol tartrate (LOPRESSOR) 25 MG tablet Take 1 tablet by mouth 2 times daily 60 tablet 5    fenofibrate 160 MG tablet Take 1 tablet by mouth daily 30 tablet 5    HUMALOG MIX 75/25 (75-25) 100 UNIT/ML SUSP injection vial 50 Units 1 vial     BD PEN NEEDLE CARLA U/F 32G X 4 MM MISC TEST THREE TIMES DAILY  3    fluticasone (FLONASE) 50 MCG/ACT nasal spray 2 sprays by Nasal route daily 1 Bottle 3    FARXIGA 10 MG tablet Take 1 tablet by mouth daily  3    nitroGLYCERIN (NITROSTAT) 0.4 MG SL tablet Place 0.4 mg under the tongue as needed      ONE TOUCH ULTRA TEST strip Test tid as directed      aspirin 81 MG tablet Take 81 mg by mouth daily.  polyethylene glycol (MIRALAX) powder Take 17 g by mouth daily. 1 Bottle 0     No current facility-administered medications for this visit.       No Known Allergies    Review of Systems  Constitutional: negative for bilaterally with normal vesicular breath sounds. No rhonchi, rales or wheezes. HEART:   point of maximum impulse is at the 5th left intercostal space, mid clavicular line. No palpable thrill. First and second heart sounds are normal.   No murmur, gallop or rub. ABDOMEN:  non-distended, soft, moves with respiration. No area of tenderness. No guarding and no rebound tenderness. No CVA tenderness. No palpable intraabdominal mass. Bowel sounds normal.     EXTREMITIES:   no cc or e. NEURO: alert and oriented x3. Cranial nerves II-XII normal with no focal deficit. Has normal gait. MUSCULOSKELETAL:   no joint swelling or deformity noted. Both feet are warm to touch. Dorsalis pedis and posterior tibia pulses are palpable. No ulcers, sores or gangrene. No evidence of critical leg ischemia. Sensation normal in eight point monofilament testing. No deformity or calluses. .    Lab Review  Orders Only on 11/25/2017   Component Date Value Ref Range Status    Sodium 11/25/2017 139  132 - 144 mEq/L Final    Potassium 11/25/2017 4.6  3.5 - 5.1 mEq/L Final    Chloride 11/25/2017 98  98 - 107 mEq/L Final    CO2 11/25/2017 25  22 - 29 mEq/L Final    Anion Gap 11/25/2017 16* 7 - 13 mEq/L Final    Glucose 11/25/2017 144* 74 - 109 mg/dL Final    BUN 11/25/2017 16  6 - 20 mg/dL Final    CREATININE 11/25/2017 0.92  0.70 - 1.20 mg/dL Final    GFR Non- 11/25/2017 >60.0  >60 Final    Comment: >60 mL/min/1.73m2 EGFR, calc. for ages 25 and older using the  MDRD formula (not corrected for weight), is valid for stable  renal function.  GFR  11/25/2017 >60.0  >60 Final    Comment: >60 mL/min/1.73m2 EGFR, calc. for ages 25 and older using the  MDRD formula (not corrected for weight), is valid for stable  renal function.       Calcium 11/25/2017 9.5  8.6 - 10.2 mg/dL Final    Total Protein 11/25/2017 7.2  6.4 - 8.1 g/dL Final    Alb 11/25/2017 4.9  3.9 - 4.9 g/dL

## 2018-02-22 DIAGNOSIS — R10.84 GENERALIZED ABDOMINAL PAIN: ICD-10-CM

## 2018-02-22 DIAGNOSIS — K58.9 IRRITABLE BOWEL SYNDROME WITHOUT DIARRHEA: ICD-10-CM

## 2018-02-22 RX ORDER — HYOSCYAMINE SULFATE EXTENDED-RELEASE 0.38 MG/1
TABLET ORAL
Qty: 60 TABLET | Refills: 1 | Status: SHIPPED | OUTPATIENT
Start: 2018-02-22 | End: 2018-05-01 | Stop reason: SDUPTHER

## 2018-02-24 ENCOUNTER — OFFICE VISIT (OUTPATIENT)
Dept: FAMILY MEDICINE CLINIC | Age: 53
End: 2018-02-24
Payer: COMMERCIAL

## 2018-02-24 VITALS
DIASTOLIC BLOOD PRESSURE: 78 MMHG | SYSTOLIC BLOOD PRESSURE: 120 MMHG | WEIGHT: 237 LBS | HEART RATE: 84 BPM | HEIGHT: 70 IN | TEMPERATURE: 97.1 F | BODY MASS INDEX: 33.93 KG/M2 | RESPIRATION RATE: 18 BRPM

## 2018-02-24 DIAGNOSIS — L03.314 CELLULITIS OF LEFT GROIN: ICD-10-CM

## 2018-02-24 DIAGNOSIS — L02.234 CARBUNCLE, GROIN: Primary | ICD-10-CM

## 2018-02-24 PROCEDURE — 99213 OFFICE O/P EST LOW 20 MIN: CPT | Performed by: FAMILY MEDICINE

## 2018-02-24 RX ORDER — HYOSCYAMINE SULFATE EXTENDED-RELEASE 0.38 MG/1
TABLET ORAL
Qty: 60 TABLET | Refills: 1 | Status: CANCELLED | OUTPATIENT
Start: 2018-02-24

## 2018-02-24 RX ORDER — SULFAMETHOXAZOLE AND TRIMETHOPRIM 800; 160 MG/1; MG/1
1 TABLET ORAL 2 TIMES DAILY
Qty: 20 TABLET | Refills: 0 | Status: SHIPPED | OUTPATIENT
Start: 2018-02-24 | End: 2018-08-24 | Stop reason: SDUPTHER

## 2018-02-24 NOTE — PATIENT INSTRUCTIONS
Patient Education        Cellulitis: Care Instructions  Your Care Instructions    Cellulitis is a skin infection. It often occurs after a break in the skin from a scrape, cut, bite, or puncture, or after a rash. The doctor has checked you carefully, but problems can develop later. If you notice any problems or new symptoms, get medical treatment right away. Follow-up care is a key part of your treatment and safety. Be sure to make and go to all appointments, and call your doctor if you are having problems. It's also a good idea to know your test results and keep a list of the medicines you take. How can you care for yourself at home? · Take your antibiotics as directed. Do not stop taking them just because you feel better. You need to take the full course of antibiotics. · Prop up the infected area on pillows to reduce pain and swelling. Try to keep the area above the level of your heart as often as you can. · If your doctor told you how to care for your wound, follow your doctor's instructions. If you did not get instructions, follow this general advice:  ¨ Wash the wound with clean water 2 times a day. Don't use hydrogen peroxide or alcohol, which can slow healing. ¨ You may cover the wound with a thin layer of petroleum jelly, such as Vaseline, and a nonstick bandage. ¨ Apply more petroleum jelly and replace the bandage as needed. · Be safe with medicines. Take pain medicines exactly as directed. ¨ If the doctor gave you a prescription medicine for pain, take it as prescribed. ¨ If you are not taking a prescription pain medicine, ask your doctor if you can take an over-the-counter medicine. To prevent cellulitis in the future  · Try to prevent cuts, scrapes, or other injuries to your skin. Cellulitis most often occurs where there is a break in the skin. · If you get a scrape, cut, mild burn, or bite, wash the wound with clean water as soon as you can to help avoid infection.  Don't use hydrogen peroxide or alcohol, which can slow healing. · If you have swelling in your legs (edema), support stockings and good skin care may help prevent leg sores and cellulitis. · Take care of your feet, especially if you have diabetes or other conditions that increase the risk of infection. Wear shoes and socks. Do not go barefoot. If you have athlete's foot or other skin problems on your feet, talk to your doctor about how to treat them. When should you call for help? Call your doctor now or seek immediate medical care if:  ? · You have signs that your infection is getting worse, such as:  ¨ Increased pain, swelling, warmth, or redness. ¨ Red streaks leading from the area. ¨ Pus draining from the area. ¨ A fever. ? · You get a rash. ? Watch closely for changes in your health, and be sure to contact your doctor if:  ? · You are not getting better after 1 day (24 hours). ? · You do not get better as expected. Where can you learn more? Go to https://Clean PET.Myshaadi.in. org and sign in to your [employer] account. Enter U923 in the [facility] box to learn more about \"Cellulitis: Care Instructions. \"     If you do not have an account, please click on the \"Sign Up Now\" link. Current as of: October 13, 2016  Content Version: 11.5  © 6400-5042 Healthwise, Incorporated. Care instructions adapted under license by [facility] ([facility]). If you have questions about a medical condition or this instruction, always ask your healthcare professional. [place] [address] any warranty or liability for your use of this information.

## 2018-03-24 DIAGNOSIS — E78.2 MIXED HYPERLIPIDEMIA: ICD-10-CM

## 2018-03-24 DIAGNOSIS — Z79.4 TYPE 2 DIABETES MELLITUS WITHOUT COMPLICATION, WITH LONG-TERM CURRENT USE OF INSULIN (HCC): ICD-10-CM

## 2018-03-24 DIAGNOSIS — I10 ESSENTIAL HYPERTENSION: ICD-10-CM

## 2018-03-24 DIAGNOSIS — E11.9 TYPE 2 DIABETES MELLITUS WITHOUT COMPLICATION, WITH LONG-TERM CURRENT USE OF INSULIN (HCC): ICD-10-CM

## 2018-03-24 LAB
ALBUMIN SERPL-MCNC: 4.6 G/DL (ref 3.9–4.9)
ALP BLD-CCNC: 47 U/L (ref 35–104)
ALT SERPL-CCNC: 36 U/L (ref 0–41)
ANION GAP SERPL CALCULATED.3IONS-SCNC: 16 MEQ/L (ref 7–13)
AST SERPL-CCNC: 22 U/L (ref 0–40)
BILIRUB SERPL-MCNC: 0.4 MG/DL (ref 0–1.2)
BUN BLDV-MCNC: 22 MG/DL (ref 6–20)
CALCIUM SERPL-MCNC: 9.4 MG/DL (ref 8.6–10.2)
CHLORIDE BLD-SCNC: 97 MEQ/L (ref 98–107)
CHOLESTEROL, TOTAL: 150 MG/DL (ref 0–199)
CO2: 24 MEQ/L (ref 22–29)
CREAT SERPL-MCNC: 1.01 MG/DL (ref 0.7–1.2)
CREATININE URINE: 29 MG/DL
GFR AFRICAN AMERICAN: >60
GFR NON-AFRICAN AMERICAN: >60
GLOBULIN: 2.8 G/DL (ref 2.3–3.5)
GLUCOSE BLD-MCNC: 123 MG/DL (ref 74–109)
HBA1C MFR BLD: 6.8 % (ref 4.8–5.9)
HDLC SERPL-MCNC: 40 MG/DL (ref 40–59)
LDL CHOLESTEROL CALCULATED: 68 MG/DL (ref 0–129)
MICROALBUMIN UR-MCNC: <1.2 MG/DL
MICROALBUMIN/CREAT UR-RTO: NORMAL MG/G (ref 0–30)
POTASSIUM SERPL-SCNC: 4.7 MEQ/L (ref 3.5–5.1)
SODIUM BLD-SCNC: 137 MEQ/L (ref 132–144)
TOTAL PROTEIN: 7.4 G/DL (ref 6.4–8.1)
TRIGL SERPL-MCNC: 208 MG/DL (ref 0–200)

## 2018-03-27 ENCOUNTER — OFFICE VISIT (OUTPATIENT)
Dept: FAMILY MEDICINE CLINIC | Age: 53
End: 2018-03-27
Payer: COMMERCIAL

## 2018-03-27 VITALS
HEIGHT: 70 IN | WEIGHT: 238 LBS | BODY MASS INDEX: 34.07 KG/M2 | HEART RATE: 96 BPM | TEMPERATURE: 98 F | SYSTOLIC BLOOD PRESSURE: 132 MMHG | RESPIRATION RATE: 16 BRPM | DIASTOLIC BLOOD PRESSURE: 68 MMHG

## 2018-03-27 DIAGNOSIS — E11.9 TYPE 2 DIABETES MELLITUS WITHOUT COMPLICATION, WITH LONG-TERM CURRENT USE OF INSULIN (HCC): Primary | ICD-10-CM

## 2018-03-27 DIAGNOSIS — K21.9 GASTROESOPHAGEAL REFLUX DISEASE, ESOPHAGITIS PRESENCE NOT SPECIFIED: ICD-10-CM

## 2018-03-27 DIAGNOSIS — E78.2 MIXED HYPERLIPIDEMIA: ICD-10-CM

## 2018-03-27 DIAGNOSIS — I10 ESSENTIAL HYPERTENSION: ICD-10-CM

## 2018-03-27 DIAGNOSIS — M72.0 DUPUYTREN'S CONTRACTURE: ICD-10-CM

## 2018-03-27 DIAGNOSIS — Z79.4 TYPE 2 DIABETES MELLITUS WITHOUT COMPLICATION, WITH LONG-TERM CURRENT USE OF INSULIN (HCC): Primary | ICD-10-CM

## 2018-03-27 PROCEDURE — 99214 OFFICE O/P EST MOD 30 MIN: CPT | Performed by: FAMILY MEDICINE

## 2018-03-27 RX ORDER — INSULIN LISPRO PROTAMIN/LISPRO 75-25/ML
30 VIAL (ML) SUBCUTANEOUS NIGHTLY
Qty: 1 VIAL | Refills: 0 | Status: SHIPPED
Start: 2018-03-27 | End: 2018-04-09 | Stop reason: DRUGHIGH

## 2018-03-27 RX ORDER — METFORMIN HYDROCHLORIDE 500 MG/1
1000 TABLET, EXTENDED RELEASE ORAL
Qty: 60 TABLET | Refills: 3 | Status: SHIPPED | OUTPATIENT
Start: 2018-03-27 | End: 2018-06-26 | Stop reason: SDUPTHER

## 2018-03-27 RX ORDER — INSULIN GLARGINE 100 [IU]/ML
40 INJECTION, SOLUTION SUBCUTANEOUS DAILY
Qty: 5 PEN | Refills: 0 | Status: SHIPPED
Start: 2018-03-27 | End: 2018-06-06 | Stop reason: SDUPTHER

## 2018-03-27 RX ORDER — INSULIN GLARGINE 100 [IU]/ML
50 INJECTION, SOLUTION SUBCUTANEOUS NIGHTLY
COMMUNITY
Start: 2018-03-24 | End: 2018-03-27 | Stop reason: DRUGHIGH

## 2018-04-01 ASSESSMENT — PATIENT HEALTH QUESTIONNAIRE - PHQ9
2. FEELING DOWN, DEPRESSED OR HOPELESS: 0
SUM OF ALL RESPONSES TO PHQ9 QUESTIONS 1 & 2: 0
1. LITTLE INTEREST OR PLEASURE IN DOING THINGS: 0
SUM OF ALL RESPONSES TO PHQ QUESTIONS 1-9: 0

## 2018-04-09 ENCOUNTER — TELEPHONE (OUTPATIENT)
Dept: FAMILY MEDICINE CLINIC | Age: 53
End: 2018-04-09

## 2018-04-09 RX ORDER — INSULIN LISPRO PROTAMIN/LISPRO 75-25/ML
55 VIAL (ML) SUBCUTANEOUS
Qty: 1 VIAL | Refills: 0 | Status: SHIPPED
Start: 2018-04-09 | End: 2018-06-26 | Stop reason: SDUPTHER

## 2018-06-04 DIAGNOSIS — E11.9 TYPE 2 DIABETES MELLITUS WITHOUT COMPLICATION, WITH LONG-TERM CURRENT USE OF INSULIN (HCC): Primary | ICD-10-CM

## 2018-06-04 DIAGNOSIS — Z79.4 TYPE 2 DIABETES MELLITUS WITHOUT COMPLICATION, WITH LONG-TERM CURRENT USE OF INSULIN (HCC): Primary | ICD-10-CM

## 2018-06-04 RX ORDER — CALCIUM CARB/VITAMIN D3/VIT K1 500-100-40
TABLET,CHEWABLE ORAL
Qty: 300 EACH | Refills: 3 | Status: SHIPPED | OUTPATIENT
Start: 2018-06-04 | End: 2018-10-13 | Stop reason: SDUPTHER

## 2018-06-06 ENCOUNTER — TELEPHONE (OUTPATIENT)
Dept: FAMILY MEDICINE CLINIC | Age: 53
End: 2018-06-06

## 2018-06-16 DIAGNOSIS — Z79.4 TYPE 2 DIABETES MELLITUS WITHOUT COMPLICATION, WITH LONG-TERM CURRENT USE OF INSULIN (HCC): ICD-10-CM

## 2018-06-16 DIAGNOSIS — I10 ESSENTIAL HYPERTENSION: ICD-10-CM

## 2018-06-16 DIAGNOSIS — E78.2 MIXED HYPERLIPIDEMIA: ICD-10-CM

## 2018-06-16 DIAGNOSIS — E11.9 TYPE 2 DIABETES MELLITUS WITHOUT COMPLICATION, WITH LONG-TERM CURRENT USE OF INSULIN (HCC): ICD-10-CM

## 2018-06-16 LAB
ALBUMIN SERPL-MCNC: 4.7 G/DL (ref 3.9–4.9)
ALP BLD-CCNC: 48 U/L (ref 35–104)
ALT SERPL-CCNC: 37 U/L (ref 0–41)
ANION GAP SERPL CALCULATED.3IONS-SCNC: 18 MEQ/L (ref 7–13)
AST SERPL-CCNC: 22 U/L (ref 0–40)
BILIRUB SERPL-MCNC: 0.4 MG/DL (ref 0–1.2)
BUN BLDV-MCNC: 15 MG/DL (ref 6–20)
CALCIUM SERPL-MCNC: 9.5 MG/DL (ref 8.6–10.2)
CHLORIDE BLD-SCNC: 101 MEQ/L (ref 98–107)
CHOLESTEROL, TOTAL: 188 MG/DL (ref 0–199)
CO2: 23 MEQ/L (ref 22–29)
CREAT SERPL-MCNC: 1.06 MG/DL (ref 0.7–1.2)
GFR AFRICAN AMERICAN: >60
GFR NON-AFRICAN AMERICAN: >60
GLOBULIN: 2.7 G/DL (ref 2.3–3.5)
GLUCOSE BLD-MCNC: 128 MG/DL (ref 74–109)
HBA1C MFR BLD: 7.1 % (ref 4.8–5.9)
HDLC SERPL-MCNC: 41 MG/DL (ref 40–59)
LDL CHOLESTEROL CALCULATED: 107 MG/DL (ref 0–129)
POTASSIUM SERPL-SCNC: 4.3 MEQ/L (ref 3.5–5.1)
SODIUM BLD-SCNC: 142 MEQ/L (ref 132–144)
TOTAL PROTEIN: 7.4 G/DL (ref 6.4–8.1)
TRIGL SERPL-MCNC: 201 MG/DL (ref 0–200)

## 2018-06-26 ENCOUNTER — OFFICE VISIT (OUTPATIENT)
Dept: FAMILY MEDICINE CLINIC | Age: 53
End: 2018-06-26
Payer: COMMERCIAL

## 2018-06-26 VITALS
RESPIRATION RATE: 14 BRPM | HEART RATE: 96 BPM | BODY MASS INDEX: 34.65 KG/M2 | WEIGHT: 242 LBS | HEIGHT: 70 IN | TEMPERATURE: 97.1 F | SYSTOLIC BLOOD PRESSURE: 138 MMHG | DIASTOLIC BLOOD PRESSURE: 68 MMHG

## 2018-06-26 DIAGNOSIS — E78.2 MIXED HYPERLIPIDEMIA: ICD-10-CM

## 2018-06-26 DIAGNOSIS — I10 ESSENTIAL HYPERTENSION: ICD-10-CM

## 2018-06-26 DIAGNOSIS — R10.84 GENERALIZED ABDOMINAL PAIN: ICD-10-CM

## 2018-06-26 DIAGNOSIS — E11.9 TYPE 2 DIABETES MELLITUS WITHOUT COMPLICATION, WITH LONG-TERM CURRENT USE OF INSULIN (HCC): Primary | ICD-10-CM

## 2018-06-26 DIAGNOSIS — Z79.4 TYPE 2 DIABETES MELLITUS WITHOUT COMPLICATION, WITH LONG-TERM CURRENT USE OF INSULIN (HCC): Primary | ICD-10-CM

## 2018-06-26 DIAGNOSIS — K58.9 IRRITABLE BOWEL SYNDROME WITHOUT DIARRHEA: ICD-10-CM

## 2018-06-26 PROCEDURE — 99214 OFFICE O/P EST MOD 30 MIN: CPT | Performed by: FAMILY MEDICINE

## 2018-06-26 RX ORDER — HYOSCYAMINE SULFATE EXTENDED-RELEASE 0.38 MG/1
TABLET ORAL
Qty: 60 TABLET | Refills: 0 | Status: CANCELLED | OUTPATIENT
Start: 2018-06-26

## 2018-06-26 RX ORDER — FENOFIBRATE 160 MG/1
160 TABLET ORAL DAILY
Qty: 30 TABLET | Refills: 5 | Status: SHIPPED | OUTPATIENT
Start: 2018-06-26 | End: 2018-10-13 | Stop reason: ALTCHOICE

## 2018-06-26 RX ORDER — GLIPIZIDE 10 MG/1
TABLET ORAL
Qty: 60 TABLET | Refills: 2 | Status: SHIPPED | OUTPATIENT
Start: 2018-06-26 | End: 2018-10-13 | Stop reason: SDUPTHER

## 2018-06-26 RX ORDER — METFORMIN HYDROCHLORIDE 500 MG/1
1000 TABLET, EXTENDED RELEASE ORAL
Qty: 60 TABLET | Refills: 3 | Status: SHIPPED | OUTPATIENT
Start: 2018-06-26 | End: 2018-10-13 | Stop reason: SDUPTHER

## 2018-06-26 RX ORDER — ATORVASTATIN CALCIUM 40 MG/1
40 TABLET, FILM COATED ORAL DAILY
Qty: 30 TABLET | Refills: 5 | Status: SHIPPED | OUTPATIENT
Start: 2018-06-26 | End: 2018-10-13 | Stop reason: SDUPTHER

## 2018-06-26 ASSESSMENT — PATIENT HEALTH QUESTIONNAIRE - PHQ9
SUM OF ALL RESPONSES TO PHQ9 QUESTIONS 1 & 2: 0
1. LITTLE INTEREST OR PLEASURE IN DOING THINGS: 0
2. FEELING DOWN, DEPRESSED OR HOPELESS: 0
SUM OF ALL RESPONSES TO PHQ QUESTIONS 1-9: 0

## 2018-07-17 NOTE — TELEPHONE ENCOUNTER
patient requesting refill. Please approve or deny this refill request. The order is pended. Thank you.     LOV 6/26/2018    Next Visit Date:  Future Appointments  Date Time Provider Jack Hollingsworth   9/26/2018 9:15 AM SCHEDULE, LAB PATRICK Laboy PCP [address] [place] [address]   10/13/2018 9:00 AM Tang Reynolds MD [facility]

## 2018-07-17 NOTE — TELEPHONE ENCOUNTER
PT CALLED IN FOR REFILL ON MED   insulin glargine (BASAGLAR KWIKPEN) 100 UNIT/ML injection pen     PLEASE ADVISE   PT PH: [phone]

## 2018-08-24 ENCOUNTER — OFFICE VISIT (OUTPATIENT)
Dept: FAMILY MEDICINE CLINIC | Age: 53
End: 2018-08-24
Payer: COMMERCIAL

## 2018-08-24 VITALS
BODY MASS INDEX: 35.65 KG/M2 | RESPIRATION RATE: 14 BRPM | SYSTOLIC BLOOD PRESSURE: 128 MMHG | TEMPERATURE: 99.2 F | DIASTOLIC BLOOD PRESSURE: 66 MMHG | OXYGEN SATURATION: 97 % | HEIGHT: 70 IN | WEIGHT: 249 LBS | HEART RATE: 99 BPM

## 2018-08-24 DIAGNOSIS — L03.314 CELLULITIS OF LEFT GROIN: ICD-10-CM

## 2018-08-24 DIAGNOSIS — L02.234 CARBUNCLE, GROIN: ICD-10-CM

## 2018-08-24 PROCEDURE — 99213 OFFICE O/P EST LOW 20 MIN: CPT | Performed by: NURSE PRACTITIONER

## 2018-08-24 RX ORDER — SULFAMETHOXAZOLE AND TRIMETHOPRIM 800; 160 MG/1; MG/1
1 TABLET ORAL 2 TIMES DAILY
Qty: 20 TABLET | Refills: 0 | Status: SHIPPED | OUTPATIENT
Start: 2018-08-24 | End: 2018-09-03

## 2018-08-24 NOTE — PROGRESS NOTES
Subjective  Office Visit  [address] [facility]  [place] [id number]  Dept: [phone]  Dept Fax: [phone]  Loc: [facility]  YOB: 1965  Age: 48 y.o. Sex: male  Date of Assessment:  8/24/2018  PCP: Rivka Fernandez MD    Chief Complaint   Patient presents with    Other     poss staph infection innner thighs noticed it [id number] put the medication Dr Florencio Veliz gave him last time but its getting worse and its really sore        HPI      Complains of: Patient c/o sores on inner thigh he states he has had this in the past and was treated with an oral antibiotic and an ointment. He tried to treat with just the ointment with no relief. The sore is now red around the sore and is firm and tender    Symptoms started: 8/23/18    Denies symptoms of: drainage    Symptoms are: gradually worsening since that time. Treatment to date: Mupirocin, which has been  ineffective.     Relieving factors: nothing    Aggravating factors: nothing    Vitals    /66 (Site: Right Arm, Position: Sitting, Cuff Size: Large Adult)   Pulse 99   Temp 99.2 °F (37.3 °C) (Temporal)   Resp 14   Ht 5' 10\" (1.778 m)   Wt 249 lb (112.9 kg)   SpO2 97%   BMI 35.73 kg/m²     BP Readings from Last 3 Encounters:   08/24/18 128/66   06/26/18 138/68   03/27/18 132/68         Wt Readings from Last 3 Encounters:   08/24/18 249 lb (112.9 kg)   06/26/18 242 lb (109.8 kg)   03/27/18 238 lb (108 kg)       Personal and Family History    Past Medical History:   Diagnosis Date    Cervical radiculopathy     Degenerative disk disease     Diabetes mellitus, type 2 (HCC)     Managed By Dr. Prince Crisostomo GERD (gastroesophageal reflux disease)     Hyperlipidemia     Hypertension        Past Surgical History:   Procedure Laterality Date    COLONOSCOPY  7/13/09,9/25/2012    DR Devin Shoemaker    UPPER GASTROINTESTINAL ENDOSCOPY  7/13/09    DR Devin Shoemaker       Family History   Problem Relation Age of Onset    Diabetes Other     Cancer Other     Heart Disease Other     High Blood Pressure Other        Social History    Social History     Social History    Marital status: Single     Spouse name: N/A    Number of children: N/A    Years of education: N/A     Occupational History    Not on file. Social History Main Topics    Smoking status: Never Smoker    Smokeless tobacco: Never Used    Alcohol use No    Drug use: No    Sexual activity: Not on file     Other Topics Concern    Not on file     Social History Narrative    No narrative on file       Medication    Current Outpatient Prescriptions on File Prior to Visit   Medication Sig Dispense Refill    metoprolol tartrate (LOPRESSOR) 25 MG tablet Take 1 tablet by mouth 2 times daily 60 tablet 5    fenofibrate 160 MG tablet Take 1 tablet by mouth daily 30 tablet 5    atorvastatin (LIPITOR) 40 MG tablet Take 1 tablet by mouth daily 30 tablet 5    metFORMIN (GLUCOPHAGE XR) 500 MG extended release tablet Take 2 tablets by mouth daily (with breakfast) 60 tablet 3    glipiZIDE (GLUCOTROL) 10 MG tablet TAKE ONE TABLET AT NIGHT BY MOUTH 60 tablet 2    insulin lispro protamine & lispro (HUMALOG MIX 75/25) (75-25) 100 UNIT per ML SUSP injection vial Inject 65 Units into the skin Daily with supper 3 vial 3    lisinopril (PRINIVIL;ZESTRIL) 10 MG tablet TAKE ONE TABLET BY MOUTH EVERY DAY 30 tablet 5    Insulin Syringe-Needle U-100 (INSULIN SYRINGE 1CC/31GX5/16\") 31G X 5/16\" 1 ML MISC Use with Humalog Mix and Humalog insulin daily as directed.  300 each 3    hyoscyamine (LEVBID) 375 MCG extended release tablet TAKE ONE TABLET BY MOUTH EVERY 12 HOURS AS NEEDED for cramping  60 tablet 0    BD PEN NEEDLE CARLA U/F 32G X 4 MM MISC TEST THREE TIMES DAILY  3    fluticasone (FLONASE) 50 MCG/ACT nasal spray 2 sprays by Nasal route daily 1 Bottle 3    FARXIGA 10 MG tablet Take 1 tablet by mouth daily  3    nitroGLYCERIN (NITROSTAT) 0.4 MG SL tablet Place 0.4 mg under the tongue as needed      ONE TOUCH ULTRA TEST strip Test tid as directed      aspirin 81 MG tablet Take 81 mg by mouth daily.  polyethylene glycol (MIRALAX) powder Take 17 g by mouth daily. 1 Bottle 0    insulin glargine (BASAGLAR KWIKPEN) 100 UNIT/ML injection pen Inject 40 Units into the skin daily 15 mL 3     No current facility-administered medications on file prior to visit. Allergies    Patient has no known allergies. ROS    Review of Systems   Constitutional: Negative for activity change, appetite change, chills, diaphoresis, fatigue and fever. Skin: Positive for wound. Negative for rash. Allergic/Immunologic: Negative for environmental allergies and food allergies. Objective    Physical Exam   Constitutional: He is oriented to person, place, and time. Vital signs are normal. He appears well-developed and well-nourished. HENT:   Head: Normocephalic. Eyes: Conjunctivae and EOM are normal.   Neck: Normal range of motion. Pulmonary/Chest: Effort normal.   Abdominal: Normal appearance. Musculoskeletal: Normal range of motion. Neurological: He is alert and oriented to person, place, and time. Skin: Skin is warm and dry. Erythematous induration in the right groin. Psychiatric: He has a normal mood and affect. His speech is normal and behavior is normal. Judgment and thought content normal. Cognition and memory are normal.   Nursing note and vitals reviewed. Assessment and Treatment     Diagnosis Orders   1. Carbuncle, groin  sulfamethoxazole-trimethoprim (BACTRIM DS) 800-160 MG per tablet   2. Cellulitis of left groin  sulfamethoxazole-trimethoprim (BACTRIM DS) 800-160 MG per tablet       Plan and Follow Up    No orders of the defined types were placed in this encounter.       Orders Placed This Encounter   Medications    sulfamethoxazole-trimethoprim (BACTRIM DS) 800-160 MG per tablet     Sig: Take 1 tablet by mouth 2 times daily for 10 days     Dispense:  20 tablet     Refill:  0     Discussed how to take antibiotic. Use warm compress. Follow up if symptoms do not resolve. Return if symptoms worsen or fail to improve. Reviewed with the patient: current clinical status, medications, activities and diet. Side effects, adverse effects of the medication prescribed today, as well as treatment plan/ rationale and result expectations have been discussed with the patient who expresses understanding and desires to proceed. Close follow up to evaluate treatment results and for coordination of care. I have reviewed the patient's medical history in detail and updated the computerized patient record.     Tram Vazquez, APRN - CNP        Future Appointments  Date Time Provider Jack Hollingsworth   9/26/2018 9:15 AM SCHEDULE, LAB PATRICK Lee Horse PCP [address] [facility]   10/13/2018 9:00 AM Agapito Mari MD [id number]

## 2018-09-12 LAB — DIABETIC RETINOPATHY: NEGATIVE

## 2018-09-29 DIAGNOSIS — E78.2 MIXED HYPERLIPIDEMIA: ICD-10-CM

## 2018-09-29 DIAGNOSIS — I10 ESSENTIAL HYPERTENSION: ICD-10-CM

## 2018-09-29 DIAGNOSIS — E11.9 TYPE 2 DIABETES MELLITUS WITHOUT COMPLICATION, WITH LONG-TERM CURRENT USE OF INSULIN (HCC): ICD-10-CM

## 2018-09-29 DIAGNOSIS — Z79.4 TYPE 2 DIABETES MELLITUS WITHOUT COMPLICATION, WITH LONG-TERM CURRENT USE OF INSULIN (HCC): ICD-10-CM

## 2018-09-29 LAB
ALBUMIN SERPL-MCNC: 5 G/DL (ref 3.9–4.9)
ALP BLD-CCNC: 46 U/L (ref 35–104)
ALT SERPL-CCNC: 33 U/L (ref 0–41)
ANION GAP SERPL CALCULATED.3IONS-SCNC: 14 MEQ/L (ref 7–13)
AST SERPL-CCNC: 20 U/L (ref 0–40)
BASOPHILS ABSOLUTE: 0 K/UL (ref 0–0.2)
BASOPHILS RELATIVE PERCENT: 0.8 %
BILIRUB SERPL-MCNC: 0.5 MG/DL (ref 0–1.2)
BUN BLDV-MCNC: 16 MG/DL (ref 6–20)
CALCIUM SERPL-MCNC: 9.7 MG/DL (ref 8.6–10.2)
CHLORIDE BLD-SCNC: 97 MEQ/L (ref 98–107)
CHOLESTEROL, TOTAL: 179 MG/DL (ref 0–199)
CO2: 24 MEQ/L (ref 22–29)
CREAT SERPL-MCNC: 1.27 MG/DL (ref 0.7–1.2)
EOSINOPHILS ABSOLUTE: 0.1 K/UL (ref 0–0.7)
EOSINOPHILS RELATIVE PERCENT: 2.7 %
GFR AFRICAN AMERICAN: >60
GFR NON-AFRICAN AMERICAN: 59.6
GLOBULIN: 2.8 G/DL (ref 2.3–3.5)
GLUCOSE BLD-MCNC: 110 MG/DL (ref 74–109)
HBA1C MFR BLD: 8.3 % (ref 4.8–5.9)
HCT VFR BLD CALC: 46.8 % (ref 42–52)
HDLC SERPL-MCNC: 37 MG/DL (ref 40–59)
HEMOGLOBIN: 15.8 G/DL (ref 14–18)
LDL CHOLESTEROL CALCULATED: 105 MG/DL (ref 0–129)
LYMPHOCYTES ABSOLUTE: 1.5 K/UL (ref 1–4.8)
LYMPHOCYTES RELATIVE PERCENT: 30.4 %
MCH RBC QN AUTO: 29.6 PG (ref 27–31.3)
MCHC RBC AUTO-ENTMCNC: 33.8 % (ref 33–37)
MCV RBC AUTO: 87.6 FL (ref 80–100)
MONOCYTES ABSOLUTE: 0.4 K/UL (ref 0.2–0.8)
MONOCYTES RELATIVE PERCENT: 9.2 %
NEUTROPHILS ABSOLUTE: 2.7 K/UL (ref 1.4–6.5)
NEUTROPHILS RELATIVE PERCENT: 56.9 %
PDW BLD-RTO: 13.6 % (ref 11.5–14.5)
PLATELET # BLD: 179 K/UL (ref 130–400)
POTASSIUM SERPL-SCNC: 4.3 MEQ/L (ref 3.5–5.1)
RBC # BLD: 5.34 M/UL (ref 4.7–6.1)
SODIUM BLD-SCNC: 135 MEQ/L (ref 132–144)
TOTAL PROTEIN: 7.8 G/DL (ref 6.4–8.1)
TRIGL SERPL-MCNC: 184 MG/DL (ref 0–200)
WBC # BLD: 4.8 K/UL (ref 4.8–10.8)

## 2018-10-13 ENCOUNTER — OFFICE VISIT (OUTPATIENT)
Dept: FAMILY MEDICINE CLINIC | Age: 53
End: 2018-10-13
Payer: COMMERCIAL

## 2018-10-13 VITALS
TEMPERATURE: 97.4 F | WEIGHT: 246 LBS | DIASTOLIC BLOOD PRESSURE: 68 MMHG | SYSTOLIC BLOOD PRESSURE: 118 MMHG | RESPIRATION RATE: 14 BRPM | BODY MASS INDEX: 35.22 KG/M2 | HEIGHT: 70 IN | HEART RATE: 72 BPM

## 2018-10-13 DIAGNOSIS — I10 ESSENTIAL HYPERTENSION: ICD-10-CM

## 2018-10-13 DIAGNOSIS — E78.2 MIXED HYPERLIPIDEMIA: ICD-10-CM

## 2018-10-13 DIAGNOSIS — K21.9 GASTROESOPHAGEAL REFLUX DISEASE, ESOPHAGITIS PRESENCE NOT SPECIFIED: ICD-10-CM

## 2018-10-13 DIAGNOSIS — Z79.4 TYPE 2 DIABETES MELLITUS WITHOUT COMPLICATION, WITH LONG-TERM CURRENT USE OF INSULIN (HCC): Primary | ICD-10-CM

## 2018-10-13 DIAGNOSIS — Z23 NEED FOR INFLUENZA VACCINATION: ICD-10-CM

## 2018-10-13 DIAGNOSIS — E11.9 TYPE 2 DIABETES MELLITUS WITHOUT COMPLICATION, WITH LONG-TERM CURRENT USE OF INSULIN (HCC): Primary | ICD-10-CM

## 2018-10-13 PROCEDURE — 90688 IIV4 VACCINE SPLT 0.5 ML IM: CPT | Performed by: FAMILY MEDICINE

## 2018-10-13 PROCEDURE — 99214 OFFICE O/P EST MOD 30 MIN: CPT | Performed by: FAMILY MEDICINE

## 2018-10-13 PROCEDURE — 90471 IMMUNIZATION ADMIN: CPT | Performed by: FAMILY MEDICINE

## 2018-10-13 RX ORDER — LISINOPRIL 10 MG/1
TABLET ORAL
Qty: 30 TABLET | Refills: 5 | Status: SHIPPED | OUTPATIENT
Start: 2018-10-13

## 2018-10-13 RX ORDER — FENOFIBRATE 160 MG/1
160 TABLET ORAL DAILY
Qty: 30 TABLET | Refills: 5 | Status: CANCELLED | OUTPATIENT
Start: 2018-10-13

## 2018-10-13 RX ORDER — GLIPIZIDE 10 MG/1
TABLET ORAL
Qty: 60 TABLET | Refills: 2 | Status: SHIPPED | OUTPATIENT
Start: 2018-10-13 | End: 2019-01-05 | Stop reason: SDUPTHER

## 2018-10-13 RX ORDER — ATORVASTATIN CALCIUM 40 MG/1
40 TABLET, FILM COATED ORAL DAILY
Qty: 30 TABLET | Refills: 5 | Status: SHIPPED | OUTPATIENT
Start: 2018-10-13

## 2018-10-13 RX ORDER — CALCIUM CARB/VITAMIN D3/VIT K1 500-100-40
TABLET,CHEWABLE ORAL
Qty: 300 EACH | Refills: 3 | Status: SHIPPED | OUTPATIENT
Start: 2018-10-13

## 2018-10-13 RX ORDER — ICOSAPENT ETHYL 1000 MG/1
2 CAPSULE ORAL 2 TIMES DAILY
Qty: 120 CAPSULE | Refills: 5 | Status: SHIPPED | OUTPATIENT
Start: 2018-10-13

## 2018-10-13 RX ORDER — METFORMIN HYDROCHLORIDE 500 MG/1
1000 TABLET, EXTENDED RELEASE ORAL
Qty: 60 TABLET | Refills: 3 | Status: SHIPPED | OUTPATIENT
Start: 2018-10-13 | End: 2019-03-05 | Stop reason: SINTOL

## 2018-10-13 ASSESSMENT — PATIENT HEALTH QUESTIONNAIRE - PHQ9
SUM OF ALL RESPONSES TO PHQ QUESTIONS 1-9: 0
1. LITTLE INTEREST OR PLEASURE IN DOING THINGS: 0
2. FEELING DOWN, DEPRESSED OR HOPELESS: 0
SUM OF ALL RESPONSES TO PHQ9 QUESTIONS 1 & 2: 0
SUM OF ALL RESPONSES TO PHQ QUESTIONS 1-9: 0

## 2018-10-13 NOTE — PROGRESS NOTES
Take 1 tablet by mouth 2 times daily 60 tablet 5    [DISCONTINUED] fenofibrate 160 MG tablet Take 1 tablet by mouth daily 30 tablet 5    [DISCONTINUED] atorvastatin (LIPITOR) 40 MG tablet Take 1 tablet by mouth daily 30 tablet 5    [DISCONTINUED] metFORMIN (GLUCOPHAGE XR) 500 MG extended release tablet Take 2 tablets by mouth daily (with breakfast) 60 tablet 3    [DISCONTINUED] glipiZIDE (GLUCOTROL) 10 MG tablet TAKE ONE TABLET AT NIGHT BY MOUTH 60 tablet 2    [DISCONTINUED] insulin lispro protamine & lispro (HUMALOG MIX 75/25) (75-25) 100 UNIT per ML SUSP injection vial Inject 65 Units into the skin Daily with supper 3 vial 3    [DISCONTINUED] lisinopril (PRINIVIL;ZESTRIL) 10 MG tablet TAKE ONE TABLET BY MOUTH EVERY DAY 30 tablet 5    [DISCONTINUED] Insulin Syringe-Needle U-100 (INSULIN SYRINGE 1CC/31GX5/16\") 31G X 5/16\" 1 ML MISC Use with Humalog Mix and Humalog insulin daily as directed. 300 each 3    [DISCONTINUED] hyoscyamine (LEVBID) 375 MCG extended release tablet TAKE ONE TABLET BY MOUTH EVERY 12 HOURS AS NEEDED for cramping  60 tablet 0     No facility-administered encounter medications on file as of 10/13/2018. Orders Placed This Encounter   Procedures    INFLUENZA, QUADV, 3 YRS AND OLDER, IM, MDV, 0.5ML (FLUZONE QUADV)    Comprehensive Metabolic Panel     Standing Status:   Future     Standing Expiration Date:   10/13/2019    Lipid Panel     Standing Status:   Future     Standing Expiration Date:   10/13/2019     Order Specific Question:   Is Patient Fasting?/# of Hours     Answer:   8-10    Hemoglobin A1C     Standing Status:   Future     Standing Expiration Date:   10/13/2019       1. Continue dietary measures. 2. Continue regular exercise. 3. Discussed general issues about diabetes pathophysiology and management.   Counseling at today's visit: focused on the need for regular aerobic exercise, focused on the need to adhere to the prescribed ADA diet, discussed the advantages of a

## 2018-10-17 RX ORDER — PEN NEEDLE, DIABETIC 32GX 5/32"
NEEDLE, DISPOSABLE MISCELLANEOUS
Qty: 300 EACH | Refills: 3 | Status: SHIPPED | OUTPATIENT
Start: 2018-10-17

## 2018-12-19 ENCOUNTER — OFFICE VISIT (OUTPATIENT)
Dept: FAMILY MEDICINE CLINIC | Age: 53
End: 2018-12-19
Payer: COMMERCIAL

## 2018-12-19 VITALS
SYSTOLIC BLOOD PRESSURE: 136 MMHG | WEIGHT: 249.6 LBS | BODY MASS INDEX: 35.73 KG/M2 | HEART RATE: 91 BPM | RESPIRATION RATE: 16 BRPM | OXYGEN SATURATION: 94 % | TEMPERATURE: 97.8 F | HEIGHT: 70 IN | DIASTOLIC BLOOD PRESSURE: 76 MMHG

## 2018-12-19 DIAGNOSIS — R19.5 CHANGE IN STOOL: Primary | ICD-10-CM

## 2018-12-19 PROCEDURE — 99213 OFFICE O/P EST LOW 20 MIN: CPT | Performed by: NURSE PRACTITIONER

## 2018-12-19 ASSESSMENT — ENCOUNTER SYMPTOMS
CONSTIPATION: 0
NAUSEA: 0
VOMITING: 0
DIARRHEA: 0

## 2018-12-19 NOTE — PROGRESS NOTES
08/24/18 128/66         Wt Readings from Last 3 Encounters:   12/19/18 249 lb 9.6 oz (113.2 kg)   10/13/18 246 lb (111.6 kg)   08/24/18 249 lb (112.9 kg)     Personal, Social, Family History and Allergies    Patient's medications, allergies, past medical, surgical, social and family histories were reviewed and updated as appropriate. ROS    Review of Systems   Gastrointestinal: Negative for constipation, diarrhea, nausea and vomiting. Cramping prior to having BM then it resolves. Dark brown stools. Objective    Physical Exam   Constitutional: He is oriented to person, place, and time. Vital signs are normal. He appears well-developed and well-nourished. HENT:   Head: Normocephalic. Eyes: Conjunctivae and EOM are normal.   Neck: Normal range of motion. Pulmonary/Chest: Effort normal.   Abdominal: Soft. Normal appearance and bowel sounds are normal. There is no hepatosplenomegaly. There is no tenderness. Genitourinary: Rectal exam shows no external hemorrhoid, no internal hemorrhoid, no fissure, no mass, no tenderness, anal tone normal and guaiac negative stool. Musculoskeletal: Normal range of motion. Neurological: He is alert and oriented to person, place, and time. Skin: Skin is warm and dry. Psychiatric: He has a normal mood and affect. His speech is normal and behavior is normal. Judgment and thought content normal. Cognition and memory are normal.   Nursing note and vitals reviewed. Patient declined chaperone. Assessment and Treatment     Diagnosis Orders   1. Change in stool  Blood Occult Stool Screen #1       Plan and Follow Up    Orders Placed This Encounter   Procedures    Blood Occult Stool Screen #1     Standing Status:   Future     Standing Expiration Date:   12/19/2019       No orders of the defined types were placed in this encounter. Return if symptoms worsen or fail to improve, for [facility] for colonoscopy. .    Negative guaiac.  Patient advised

## 2018-12-29 DIAGNOSIS — E11.9 TYPE 2 DIABETES MELLITUS WITHOUT COMPLICATION, WITH LONG-TERM CURRENT USE OF INSULIN (HCC): ICD-10-CM

## 2018-12-29 DIAGNOSIS — I10 ESSENTIAL HYPERTENSION: ICD-10-CM

## 2018-12-29 DIAGNOSIS — E78.2 MIXED HYPERLIPIDEMIA: ICD-10-CM

## 2018-12-29 DIAGNOSIS — Z79.4 TYPE 2 DIABETES MELLITUS WITHOUT COMPLICATION, WITH LONG-TERM CURRENT USE OF INSULIN (HCC): ICD-10-CM

## 2018-12-29 LAB
ALBUMIN SERPL-MCNC: 4.7 G/DL (ref 3.9–4.9)
ALP BLD-CCNC: 55 U/L (ref 35–104)
ALT SERPL-CCNC: 31 U/L (ref 0–41)
ANION GAP SERPL CALCULATED.3IONS-SCNC: 14 MEQ/L (ref 7–13)
AST SERPL-CCNC: 21 U/L (ref 0–40)
BILIRUB SERPL-MCNC: 0.3 MG/DL (ref 0–1.2)
BUN BLDV-MCNC: 19 MG/DL (ref 6–20)
CALCIUM SERPL-MCNC: 9.1 MG/DL (ref 8.6–10.2)
CHLORIDE BLD-SCNC: 102 MEQ/L (ref 98–107)
CHOLESTEROL, TOTAL: 202 MG/DL (ref 0–199)
CO2: 24 MEQ/L (ref 22–29)
CREAT SERPL-MCNC: 0.94 MG/DL (ref 0.7–1.2)
GFR AFRICAN AMERICAN: >60
GFR NON-AFRICAN AMERICAN: >60
GLOBULIN: 2.5 G/DL (ref 2.3–3.5)
GLUCOSE BLD-MCNC: 110 MG/DL (ref 74–109)
HBA1C MFR BLD: 8.3 % (ref 4.8–5.9)
HDLC SERPL-MCNC: 35 MG/DL (ref 40–59)
LDL CHOLESTEROL CALCULATED: ABNORMAL MG/DL (ref 0–129)
POTASSIUM SERPL-SCNC: 4.3 MEQ/L (ref 3.5–5.1)
SODIUM BLD-SCNC: 140 MEQ/L (ref 132–144)
TOTAL PROTEIN: 7.2 G/DL (ref 6.4–8.1)
TRIGL SERPL-MCNC: 429 MG/DL (ref 0–200)

## 2019-01-02 ENCOUNTER — OFFICE VISIT (OUTPATIENT)
Dept: FAMILY MEDICINE CLINIC | Age: 54
End: 2019-01-02
Payer: COMMERCIAL

## 2019-01-02 VITALS
SYSTOLIC BLOOD PRESSURE: 116 MMHG | DIASTOLIC BLOOD PRESSURE: 72 MMHG | RESPIRATION RATE: 20 BRPM | HEART RATE: 92 BPM | OXYGEN SATURATION: 98 % | WEIGHT: 247.6 LBS | HEIGHT: 70 IN | TEMPERATURE: 98.1 F | BODY MASS INDEX: 35.45 KG/M2

## 2019-01-02 DIAGNOSIS — J01.90 ACUTE BACTERIAL SINUSITIS: Primary | ICD-10-CM

## 2019-01-02 DIAGNOSIS — B96.89 ACUTE BACTERIAL SINUSITIS: Primary | ICD-10-CM

## 2019-01-02 DIAGNOSIS — R05.9 COUGH: ICD-10-CM

## 2019-01-02 PROCEDURE — 99213 OFFICE O/P EST LOW 20 MIN: CPT | Performed by: NURSE PRACTITIONER

## 2019-01-02 RX ORDER — AMOXICILLIN AND CLAVULANATE POTASSIUM 875; 125 MG/1; MG/1
1 TABLET, FILM COATED ORAL 2 TIMES DAILY
Qty: 14 TABLET | Refills: 0 | Status: SHIPPED | OUTPATIENT
Start: 2019-01-02 | End: 2019-01-09

## 2019-01-02 RX ORDER — ALBUTEROL SULFATE 90 UG/1
2 AEROSOL, METERED RESPIRATORY (INHALATION) EVERY 6 HOURS PRN
Qty: 1 INHALER | Refills: 0 | Status: SHIPPED | OUTPATIENT
Start: 2019-01-02

## 2019-01-02 RX ORDER — AMOXICILLIN AND CLAVULANATE POTASSIUM 875; 125 MG/1; MG/1
1 TABLET, FILM COATED ORAL 2 TIMES DAILY
Qty: 14 TABLET | Refills: 0 | Status: SHIPPED | OUTPATIENT
Start: 2019-01-02 | End: 2019-01-02 | Stop reason: SDUPTHER

## 2019-01-02 ASSESSMENT — ENCOUNTER SYMPTOMS
COUGH: 1
HOARSE VOICE: 1
SINUS PRESSURE: 1
RHINORRHEA: 1

## 2019-01-05 ENCOUNTER — OFFICE VISIT (OUTPATIENT)
Dept: FAMILY MEDICINE CLINIC | Age: 54
End: 2019-01-05
Payer: COMMERCIAL

## 2019-01-05 VITALS
RESPIRATION RATE: 14 BRPM | BODY MASS INDEX: 35.22 KG/M2 | TEMPERATURE: 96.9 F | SYSTOLIC BLOOD PRESSURE: 120 MMHG | HEIGHT: 70 IN | WEIGHT: 246 LBS | DIASTOLIC BLOOD PRESSURE: 64 MMHG | HEART RATE: 76 BPM

## 2019-01-05 DIAGNOSIS — Z79.4 TYPE 2 DIABETES MELLITUS WITHOUT COMPLICATION, WITH LONG-TERM CURRENT USE OF INSULIN (HCC): Primary | ICD-10-CM

## 2019-01-05 DIAGNOSIS — E78.2 MIXED HYPERLIPIDEMIA: ICD-10-CM

## 2019-01-05 DIAGNOSIS — I10 ESSENTIAL HYPERTENSION: ICD-10-CM

## 2019-01-05 DIAGNOSIS — K52.9 CHRONIC DIARRHEA: ICD-10-CM

## 2019-01-05 DIAGNOSIS — K21.9 GASTROESOPHAGEAL REFLUX DISEASE, ESOPHAGITIS PRESENCE NOT SPECIFIED: ICD-10-CM

## 2019-01-05 DIAGNOSIS — E11.9 TYPE 2 DIABETES MELLITUS WITHOUT COMPLICATION, WITH LONG-TERM CURRENT USE OF INSULIN (HCC): Primary | ICD-10-CM

## 2019-01-05 DIAGNOSIS — M72.2 PLANTAR FASCIITIS, LEFT: ICD-10-CM

## 2019-01-05 LAB
BASOPHILS ABSOLUTE: 0 K/UL (ref 0–0.2)
BASOPHILS RELATIVE PERCENT: 0.7 %
EOSINOPHILS ABSOLUTE: 0.1 K/UL (ref 0–0.7)
EOSINOPHILS RELATIVE PERCENT: 2.9 %
HCT VFR BLD CALC: 48.1 % (ref 42–52)
HEMOGLOBIN: 16.1 G/DL (ref 14–18)
LYMPHOCYTES ABSOLUTE: 1.4 K/UL (ref 1–4.8)
LYMPHOCYTES RELATIVE PERCENT: 30.6 %
MCH RBC QN AUTO: 29.3 PG (ref 27–31.3)
MCHC RBC AUTO-ENTMCNC: 33.4 % (ref 33–37)
MCV RBC AUTO: 87.6 FL (ref 80–100)
MONOCYTES ABSOLUTE: 0.3 K/UL (ref 0.2–0.8)
MONOCYTES RELATIVE PERCENT: 7 %
NEUTROPHILS ABSOLUTE: 2.7 K/UL (ref 1.4–6.5)
NEUTROPHILS RELATIVE PERCENT: 58.8 %
PDW BLD-RTO: 13.5 % (ref 11.5–14.5)
PLATELET # BLD: 205 K/UL (ref 130–400)
RBC # BLD: 5.48 M/UL (ref 4.7–6.1)
WBC # BLD: 4.6 K/UL (ref 4.8–10.8)

## 2019-01-05 PROCEDURE — 99214 OFFICE O/P EST MOD 30 MIN: CPT | Performed by: FAMILY MEDICINE

## 2019-01-05 RX ORDER — GLIPIZIDE 10 MG/1
TABLET ORAL
Qty: 60 TABLET | Refills: 2 | Status: SHIPPED | OUTPATIENT
Start: 2019-01-05

## 2019-01-07 DIAGNOSIS — R19.5 CHANGE IN STOOL: ICD-10-CM

## 2019-01-07 DIAGNOSIS — K52.9 CHRONIC DIARRHEA: ICD-10-CM

## 2019-01-08 LAB
C DIFFICILE TOXIN, EIA: NORMAL
GI BACTERIAL PATHOGENS BY PCR: NORMAL
HEMOCCULT STL QL: NORMAL

## 2019-01-24 RX ORDER — FENOFIBRATE 160 MG/1
TABLET ORAL
Qty: 30 TABLET | Refills: 4 | OUTPATIENT
Start: 2019-01-24

## 2019-01-25 ENCOUNTER — OFFICE VISIT (OUTPATIENT)
Dept: FAMILY MEDICINE CLINIC | Age: 54
End: 2019-01-25
Payer: COMMERCIAL

## 2019-01-25 VITALS
SYSTOLIC BLOOD PRESSURE: 128 MMHG | WEIGHT: 249 LBS | BODY MASS INDEX: 35.65 KG/M2 | RESPIRATION RATE: 18 BRPM | OXYGEN SATURATION: 94 % | DIASTOLIC BLOOD PRESSURE: 76 MMHG | TEMPERATURE: 97 F | HEIGHT: 70 IN | HEART RATE: 85 BPM

## 2019-01-25 DIAGNOSIS — M54.9 MUSCULOSKELETAL BACK PAIN: Primary | ICD-10-CM

## 2019-01-25 PROCEDURE — 99213 OFFICE O/P EST LOW 20 MIN: CPT | Performed by: INTERNAL MEDICINE

## 2019-01-25 RX ORDER — ORPHENADRINE CITRATE 100 MG/1
100 TABLET, EXTENDED RELEASE ORAL 2 TIMES DAILY
Qty: 20 TABLET | Refills: 0 | Status: SHIPPED | OUTPATIENT
Start: 2019-01-25 | End: 2019-02-04

## 2019-01-26 ASSESSMENT — ENCOUNTER SYMPTOMS
WHEEZING: 0
SINUS PRESSURE: 0
DIARRHEA: 0
SINUS PAIN: 0
VOMITING: 0
RHINORRHEA: 0
ABDOMINAL PAIN: 0
NAUSEA: 0
SORE THROAT: 0
BACK PAIN: 1
SHORTNESS OF BREATH: 0
COUGH: 0

## 2019-03-05 ENCOUNTER — OFFICE VISIT (OUTPATIENT)
Dept: FAMILY MEDICINE CLINIC | Age: 54
End: 2019-03-05
Payer: COMMERCIAL

## 2019-03-05 VITALS
DIASTOLIC BLOOD PRESSURE: 82 MMHG | HEIGHT: 70 IN | SYSTOLIC BLOOD PRESSURE: 122 MMHG | TEMPERATURE: 98.7 F | RESPIRATION RATE: 16 BRPM | HEART RATE: 96 BPM | BODY MASS INDEX: 36.08 KG/M2 | WEIGHT: 252 LBS

## 2019-03-05 DIAGNOSIS — S39.012D BACK STRAIN, SUBSEQUENT ENCOUNTER: Primary | ICD-10-CM

## 2019-03-05 DIAGNOSIS — E11.65 UNCONTROLLED TYPE 2 DIABETES MELLITUS WITH HYPERGLYCEMIA (HCC): ICD-10-CM

## 2019-03-05 PROCEDURE — 99213 OFFICE O/P EST LOW 20 MIN: CPT | Performed by: FAMILY MEDICINE

## 2019-03-05 RX ORDER — METFORMIN HYDROCHLORIDE 500 MG/1
500 TABLET, EXTENDED RELEASE ORAL
Qty: 30 TABLET | Refills: 3 | Status: SHIPPED | OUTPATIENT
Start: 2019-03-05

## 2019-03-05 RX ORDER — ORPHENADRINE CITRATE 100 MG/1
100 TABLET, EXTENDED RELEASE ORAL 2 TIMES DAILY
Qty: 60 TABLET | Refills: 0 | Status: SHIPPED | OUTPATIENT
Start: 2019-03-05 | End: 2020-03-04

## 2019-03-19 ENCOUNTER — TELEPHONE (OUTPATIENT)
Dept: FAMILY MEDICINE CLINIC | Age: 54
End: 2019-03-19

## 2021-11-22 ENCOUNTER — HOSPITAL ENCOUNTER (OUTPATIENT)
Dept: PHYSICAL THERAPY | Age: 56
Setting detail: THERAPIES SERIES
Discharge: HOME | End: 2021-11-22
Payer: COMMERCIAL

## 2021-11-22 PROCEDURE — 97162 PT EVAL MOD COMPLEX 30 MIN: CPT

## 2021-11-22 PROCEDURE — 97110 THERAPEUTIC EXERCISES: CPT

## 2021-11-22 ASSESSMENT — PAIN DESCRIPTION - LOCATION: LOCATION: SHOULDER

## 2021-11-22 ASSESSMENT — PAIN DESCRIPTION - DESCRIPTORS: DESCRIPTORS: ACHING;NUMBNESS

## 2021-11-22 ASSESSMENT — PAIN DESCRIPTION - PAIN TYPE: TYPE: CHRONIC PAIN

## 2021-11-22 ASSESSMENT — PAIN DESCRIPTION - ORIENTATION: ORIENTATION: LEFT

## 2021-11-22 ASSESSMENT — PAIN SCALES - GENERAL: PAINLEVEL_OUTOF10: 4

## 2021-11-22 NOTE — PROGRESS NOTES
[facility]   Outpatient Physical Therapy   Evaluation      [] [address]  [x] [facility]     Date: 2021  Patient: Danii Estrella  : 1965  ACCT #: [de-identified]  Referring physician: Referring Practitioner: Zara LOCKE    Referring Practitioner: Zara LOCKE    Referral Date : 21    Diagnosis: Strain of muscle and tendon of ther rotator cuff    Treatment Diagnosis: impaired left shoulder ROM, impaired left shoulder strength, left shoulder pain  Onset Date: 10/05/21  PT Insurance Information: [facility]      Total # of Visits to Date: 1  No Show: 0  Canceled Appointment: 0     Plan of Care/Certification Expiration Date: 22    History   has a past medical history of Cervical radiculopathy, Degenerative disk disease, Diabetes mellitus, type 2 ([address] GERD (gastroesophageal reflux disease), Hyperlipidemia, and Hypertension. has a past surgical history that includes Colonoscopy (09,2012) and Upper gastrointestinal endoscopy (09).     No Known Allergies  Current Outpatient Medications on File Prior to Encounter   Medication Sig Dispense Refill    insulin glargine (BASAGLAR KWIKPEN) 100 UNIT/ML injection pen Inject 65 Units into the skin daily 20 mL 2    metFORMIN (GLUCOPHAGE XR) 500 MG extended release tablet Take 1 tablet by mouth daily (with breakfast) 30 tablet 3    glipiZIDE (GLUCOTROL) 10 MG tablet TAKE ONE TABLET AT NIGHT BY MOUTH 60 tablet 2    insulin lispro protamine & lispro (HUMALOG MIX 75/25) (75-25) 100 UNIT per ML SUSP injection vial Inject 70 Units into the skin Daily with supper 30 mL 3    albuterol sulfate  (90 Base) MCG/ACT inhaler Inhale 2 puffs into the lungs every 6 hours as needed for Wheezing 1 Inhaler 0    dapagliflozin (FARXIGA) 10 MG tablet Take 1 tablet by mouth every morning 30 tablet 5    BD PEN NEEDLE CARLA U/F 32G X 4 MM MISC TEST THREE TIMES DAILY 300 each 3    atorvastatin (LIPITOR) 40 MG tablet Take 1 tablet by mouth daily 30 tablet 5    metoprolol tartrate (LOPRESSOR) 25 MG tablet Take 1 tablet by mouth 2 times daily 60 tablet 5    lisinopril (PRINIVIL;ZESTRIL) 10 MG tablet TAKE ONE TABLET BY MOUTH EVERY DAY 30 tablet 5    Insulin Syringe-Needle U-100 (INSULIN SYRINGE 1CC/31GX5/16\") 31G X 5/16\" 1 ML MISC Use with Humalog Mix and Humalog insulin daily as directed. 300 each 3    Icosapent Ethyl (VASCEPA) 1 g CAPS capsule Take 2 capsules by mouth 2 times daily 120 capsule 5    fluticasone (FLONASE) 50 MCG/ACT nasal spray 2 sprays by Nasal route daily 1 Bottle 3    nitroGLYCERIN (NITROSTAT) 0.4 MG SL tablet Place 0.4 mg under the tongue as needed      ONE TOUCH ULTRA TEST strip Test tid as directed      aspirin 81 MG tablet Take 81 mg by mouth daily.  polyethylene glycol (MIRALAX) powder Take 17 g by mouth daily. 1 Bottle 0     No current facility-administered medications on file prior to encounter. Subjective  Subjective: Patient reports he dropped a mill jordan from his right hand and caught it with left hand causing injury to left shoulder. Injury occured on 10/5/21. Increased pain with lifting and reaching above head. Nothing has made it better. Reports tingling in left hand at night. Reports 7/10 pain in left shoulder at its worse.   Additional Pertinent Hx: hx of MI, OA, DM, GERD, heart disease, hx of right rotator cuff surgery  Pain Screening  Patient Currently in Pain: Yes  Pain Assessment  Pain Assessment: 0-10  Pain Level: 4  Pain Type: Chronic pain  Pain Location: Shoulder  Pain Orientation: Left  Pain Descriptors: Aching, Numbness    Social/Functional History  Lives With: Alone  ADL Assistance: Independent  Homemaking Assistance: Independent  Ambulation Assistance: Independent  Transfer Assistance: Independent  Occupation: Full time employment  Type of occupation:          Objective  Vision  Vision: Within Functional Limits (wears glasses)  Hearing  Hearing: Within functional limits  Observation/Palpation  Posture: Fair (rounded shoulders)  Palpation: increased tightness in pectoral musculature    Strength RUE  Strength RUE: WFL  Comment: grossly 5/5  Strength LUE  L Shoulder Flexion: 4/5  L Shoulder Extension: 4/5  L Shoulder ABduction: 4/5  L Shoulder Internal Rotation: 4-/5  L Shoulder External Rotation: 4/5  L Elbow Flexion: 3+/5  L Elbow Extension: 4/5                       AROM LUE (degrees)  L Shoulder Flexion 0-180: 105 deg  L Shoulder Extension 0-45: 45 deg  L Shoulder ABduction 0-180: 114 deg  L Shoulder Int Rotation  0-70: 20 deg supine with 90 deg of abduction  L Shoulder Ext Rotation  0-90: 67 deg supine with 90 deg of abduction                Additional Measures  Special Tests: + Hawkin's shikha test, pain with bicep strength testing  Other: UEFI: 45/80  Sensation  Overall Sensation Status: Impaired  Additional Comments: reports occasionally tingling in left UE  Additional Comments: reports occasionally tingling in left UE   Bed mobility  Supine to Sit: Independent  Sit to Supine: Independent     Transfers  Sit to Stand: Independent  Stand to sit:  Independent  Ambulation 1  Surface: carpet  Device: No Device  Assistance: Independent  Gait Deviations: None  Distance: clinical distance in department                      Exercises:   Exercises  Exercise 1: pec stretch arms at 45 deg 15s x 3 (verbal cues to avoid pain)  Exercise 2: table slides flexion, scaption, left shoulder 5s x 5  Exercise 3: scapular retraction 5s x 10  Exercise 4: try US on anterior left shoulder NV*  Exercise 5: pulleys*  Exercise 6: wand ex*  Exercise 7: Tband rows/lats*  Exercise 20: HEP: pec stretch arms at 45 deg, table slides, scapular retraction    Manual:  Manual therapy  PROM: left shoulder*  Soft Tissue Mobalization: left shoulder*    Modalities:  Modalities  Moist heat: PRN*  Cryotherapy (Minutes\Location): PRN*  Ultrasound: left shoulder 1.2 w/cm2, 3 MHz, continous*  E-stim (parameters): PRN*      ** indicates treatment to be performed at future treatment     POST-PAIN    Pain Rating (0-10 pain scale): 4/10  Location and Pain Description same as pre-pain unless otherwise indicated. Action: [] NA  [] Call Physician  [x] Perform HEP  [x] Meds as prescribed     Assessment   Conditions Requiring Skilled Therapeutic Intervention  Body structures, Functions, Activity limitations: Decreased ROM, Decreased strength, Increased pain, Decreased posture, Decreased endurance  Assessment: Patient reports work injury to left shoulder causing increase pain with movement and with sleeping. Upon PT evaluation, patient demonstrates decreased strength in left shoulder with impaired ROM. Further PT recommended to improve ROM, strength, and decrease pain for overall quality of life. Treatment Diagnosis: impaired left shoulder ROM, impaired left shoulder strength, left shoulder pain  Prognosis: Good  Decision Making: Medium Complexity  History: hx of MI, DM, OA, GERD  Exam: impaired left shoulder ROM, impaired left shoulder strength, left shoulder pain  Clinical Presentation: evolving  REQUIRES PT FOLLOW UP: Yes  Discharge Recommendations: Continue to assess pending progress    Patient Education   PT Education: Goals, PT Role, Plan of Care, Home Exercise Program    Pt verbalized/demonstrated good understanding:     [x] Yes         [] No, pt required further clarification. Goals   Patient goal: Patient goals : \"improve left shoulder\"         Long term goals  Time Frame for Long term goals : 12 visits  Long term goal 1: Patient will report </= 1/10 pain in left shoulder with work and functional activities. Long term goal 2: Patient will increase left shoulder ROM to [facility] for improved reaching tolerance. Long term goal 3: Patient will increase strength in left shoulder to 5/5 for improved lifting tolerance.   Long term goal 4: UEFI >/= 60/80 to demonstrate functional improvements. Long term goal 5: Patient will be independent with HEP. Plan:  Plan  Times per week: 2-3  Plan weeks: 4 weeks (12 visits approved)  Current Treatment Recommendations: Strengthening, ROM, Endurance Training, Neuromuscular Re-education, Manual Therapy - Soft Tissue Mobilization, Manual Therapy - Joint Manipulation, Home Exercise Program, Patient/Caregiver Education & Training, Modalities       Evaluation and patient rights have been reviewed and patient agrees with plan of care. Yes  []  No  []   Explain:     Signature:    PT Individual Minutes  Time In: 4633  Time Out: 1450  Minutes: 45  Timed Code Treatment Minutes: 8 Minutes  Procedure Minutes: 37 PT evaluation minutes    Arreola Fall Risk Assessment  Risk Factor Scale  Score   History of Falls [] Yes  [x] No 25  0 0   Secondary Diagnosis [] Yes  [x] No 15  0 0   Ambulatory Aid [] Furniture  [] Crutches/cane/walker  [x] None/bedrest/wheelchair/nurse 30  15  0 0   IV/Heparin Lock [] Yes  [x] No 20  0 0   Gait/Transferring [] Impaired  [] Weak  [x] Normal/bedrest/immobile 20  10  0 0   Mental Status [] Forgets limitations  [x] Oriented to own ability 15  0 0      Total:0     Based on the Assessment score: check the appropriate box.   [x]  No intervention needed   Low =   Score of 0-24  []  Use standard prevention interventions Moderate =  Score of 24-44   [] Discuss fall prevention strategies   [] Indicate moderate falls risk on eval  []  Use high risk prevention interventions High = Score of 45 and higher   [] Discuss fall prevention strategies   [] Provide supervision during treatment time

## 2021-11-22 NOTE — PLAN OF CARE
HEP. Patient issued HEP. [] yes  [x] no     Body structures, Functions, Activity limitations: Decreased ROM, Decreased strength, Increased pain, Decreased posture, Decreased endurance  Assessment: Patient reports work injury to left shoulder causing increase pain with movement and with sleeping. Upon PT evaluation, patient demonstrates decreased strength in left shoulder with impaired ROM. Further PT recommended to improve ROM, strength, and decrease pain for overall quality of life. Prognosis: Good  Discharge Recommendations: Continue to assess pending progress  New Education Provided: PT Education: Goals, PT Role, Plan of Care, Home Exercise Program    PLAN: [x] Evaluate and Treat  Frequency/Duration:  Plan  Times per week: 2-3  Plan weeks: 4 weeks (12 visits approved)  Current Treatment Recommendations: Strengthening, ROM, Endurance Training, Neuromuscular Re-education, Manual Therapy - Soft Tissue Mobilization, Manual Therapy - Joint Manipulation, Home Exercise Program, Patient/Caregiver Education & Training, Modalities     Precautions:             Patient Status:[x] Continue/ Initate plan of Care    [] Discharge PT. Recommend pt continue with HEP. [] Additional visits requested, Please re-certify for additional visits:        Signature: Electronically signed by Sabiha Garcia PT on 11/22/21 at 4:57 PM EST      If you have any questions or concerns, please don't hesitate to call. Thank you for your referral.    I have reviewed this plan of care and certify a need for medically necessary rehabilitation services.     Physician Signature:__________________________________________________________  Date:  Please sign and return

## 2021-11-23 ENCOUNTER — HOSPITAL ENCOUNTER (OUTPATIENT)
Dept: PHYSICAL THERAPY | Age: 56
Setting detail: THERAPIES SERIES
Discharge: HOME | End: 2021-11-23
Payer: COMMERCIAL

## 2021-11-23 PROCEDURE — 97110 THERAPEUTIC EXERCISES: CPT

## 2021-11-23 PROCEDURE — G0283 ELEC STIM OTHER THAN WOUND: HCPCS

## 2021-11-23 PROCEDURE — 97035 APP MDLTY 1+ULTRASOUND EA 15: CPT

## 2021-11-23 ASSESSMENT — PAIN SCALES - GENERAL: PAINLEVEL_OUTOF10: 3

## 2021-11-23 ASSESSMENT — PAIN DESCRIPTION - ORIENTATION: ORIENTATION: LEFT

## 2021-11-23 ASSESSMENT — PAIN DESCRIPTION - LOCATION: LOCATION: SHOULDER

## 2021-11-23 ASSESSMENT — PAIN DESCRIPTION - DESCRIPTORS: DESCRIPTORS: SORE

## 2021-11-23 NOTE — PROGRESS NOTES
[facility]   Outpatient Physical Therapy   Treatment Note  [] [address]  [x] [facility]            of [address]  Date: 2021  Patient: Ramos Chan  : 1965  ACCT #: [de-identified]  Referring Practitioner: Sangeeta LOCKE  Diagnosis: Strain of muscle and tendon of ther rotator cuff    Visit Information:  PT Visit Information  Onset Date: 10/05/21  PT Insurance Information: [facility]  Total # of Visits to Date: 2  Plan of Care/Certification Expiration Date: 22  No Show: 0  Canceled Appointment: 0  Progress Note Counter:  [facility] (approved from 21 to 22)    SUBJECTIVE:   Subjective  Subjective: Pt reports has been sore since eval with increase pain last night 6/10 with work activities. Reports now decreased to 3/10  HEP Compliance:  [x] Good [] Fair [] Poor [] Reports not doing due to:    PAIN   Location:   Pain Location: Shoulder  Pain Rating (0-10 pain scale):  Pain Level: 3  Pain Description:  Pain Descriptors: Sore  Action:  [x] Acceptable for treatment  []  Other:    OBJECTIVE:   Exercises  Exercise 1: pec stretch arms at 45 deg 15s x 3 (verbal cues to avoid pain)  Exercise 2: table slides flexion, left shoulder 5s x 5  Exercise 3: scapular retraction 5s x 10  Exercise 5: pulleys3 min flex, abd  Exercise 20: HEP: cont current as oswaldo -avoid pain with stretches    Modalities: []  NA  Modalities  Ultrasound: left ant shoulder 1.2 w/cm2, 3 MHz, continous x 8 min  E-stim (parameters): IFC/CP x 15 min to decrease pain and inflammation     HEP  Continue with current Home Exercise Program.  See Objective section for progression of HEP. Comments:       POST-PAIN    Pain Rating (0-10 pain scale): 0/10  Location and Pain Description same as pre-pain unless otherwise indicated.   Action: [] NA  [] Call Physician  [x] Perform HEP  [] Meds as prescribed     ASSESSMENT:     Conditions Requiring Skilled Therapeutic Intervention  Body structures, Functions, Activity limitations: Decreased ROM, Decreased strength, Increased pain, Decreased posture, Decreased endurance  Assessment: Pt with increase c/o of \"soreness\" with ther-ex. Utilized modalities to decrease pain and inflammation with good result. Will cont to progress as oswaldo per POC next visit. Treatment Diagnosis: impaired left shoulder ROM, impaired left shoulder strength, left shoulder pain     Tolerance to treatment:  [] Good   [x] Fair   [] Poor  [] Fatigued   [] Increased pain   Limited by:    Goals:        Long term goals  Time Frame for Long term goals : 12 visits  Long term goal 1: Patient will report </= 1/10 pain in left shoulder with work and functional activities. Long term goal 2: Patient will increase left shoulder ROM to [facility] for improved reaching tolerance. Long term goal 3: Patient will increase strength in left shoulder to 5/5 for improved lifting tolerance. Long term goal 4: UEFI >/= 60/80 to demonstrate functional improvements. Long term goal 5: Patient will be independent with HEP. Progress toward goals:  Goals Met:    []  See updated POC   Comments:    PLAN:  [x] Continue POC to pt tolerance  [] Hold PT for ___ days.   See note to physician  [] Discharge PT    Signature:   Electronically signed by Wendy Collins PTA on 11/23/21 at 10:06 AM EST    PT Individual Minutes  Time In: 1100  Time Out: 1138  Minutes: 53  Timed Code Treatment Minutes: 38 Minutes            Activity Minutes Units   Ther-ex 30 2   ultrasound 8 1   IFC/CP 15 1

## 2021-11-29 ENCOUNTER — HOSPITAL ENCOUNTER (OUTPATIENT)
Dept: PHYSICAL THERAPY | Age: 56
Setting detail: THERAPIES SERIES
Discharge: HOME | End: 2021-11-29
Payer: COMMERCIAL

## 2021-11-29 PROCEDURE — 97140 MANUAL THERAPY 1/> REGIONS: CPT

## 2021-11-29 PROCEDURE — G0283 ELEC STIM OTHER THAN WOUND: HCPCS

## 2021-11-29 PROCEDURE — 97110 THERAPEUTIC EXERCISES: CPT

## 2021-11-29 PROCEDURE — 97035 APP MDLTY 1+ULTRASOUND EA 15: CPT

## 2021-11-29 ASSESSMENT — PAIN DESCRIPTION - FREQUENCY: FREQUENCY: CONTINUOUS

## 2021-11-29 ASSESSMENT — PAIN DESCRIPTION - PAIN TYPE: TYPE: CHRONIC PAIN

## 2021-11-29 ASSESSMENT — PAIN DESCRIPTION - LOCATION: LOCATION: SHOULDER

## 2021-11-29 ASSESSMENT — PAIN DESCRIPTION - DESCRIPTORS: DESCRIPTORS: SORE

## 2021-11-29 ASSESSMENT — PAIN DESCRIPTION - ORIENTATION: ORIENTATION: LEFT

## 2021-11-29 ASSESSMENT — PAIN SCALES - GENERAL: PAINLEVEL_OUTOF10: 4

## 2021-11-29 NOTE — PROGRESS NOTES
[facility]   Outpatient Physical Therapy   Treatment Note  [] [address]  [x] [facility]            of [address]  Date: 2021  Patient: Mago Colmenares  : 1965  ACCT #: [de-identified]  Referring Practitioner: Vianney LOCKE  Diagnosis: Strain of muscle and tendon of ther rotator cuff  Treatment Diagnosis: impaired left shoulder ROM, impaired left shoulder strength, left shoulder pain     Visit Information:  PT Visit Information  Onset Date: 10/05/21  PT Insurance Information: [employer]  Total # of Visits to Date: 3  Plan of Care/Certification Expiration Date: 22  No Show: 0  Canceled Appointment: 0  Progress Note Counter: 3/12 [facility] (approved from 21 to 22)    SUBJECTIVE:   Subjective  Subjective: Patient c/o muscle soreness x2 days after last visit. Continues to c/o frequent sleep disturbance. Attempted to modify sleep/arm position however no change in frequency of waking up at night. States he purchased a posture brace. Notes arm feels fore after several hours of wearing it.   HEP Compliance:  [x] Good [] Fair [] Poor [] Reports not doing due to:    PAIN   Location:   Pain Location: Shoulder  Pain Rating (0-10 pain scale):  Pain Level: 4  Pain Description:  Pain Descriptors: Sore  Action:  [x] Acceptable for treatment  []  Other:    OBJECTIVE:   Exercises  Exercise 1: pec stretch arms at 45 deg 15s x 3 (verbal cues to avoid pain)  Exercise 2: table slides flexion/scaption 5\"x15  Exercise 3: scapular retraction 5s x 10  Exercise 4: US to anterior left shoulder- see modalities  Exercise 5: pulleys3 min flex, abd  Exercise 6: wand ex supine flexion/abduction 5\"x0    Manual: []  NA   Manual therapy  PROM: left shoulder gentle all planes 8 minutes  Soft Tissue Mobalization: left shoulder*    Modalities: []  NA  Modalities  Ultrasound: left ant shoulder 1.2 w/cm2, 3 MHz, continous x 8 min  E-stim (parameters): IFC/CP x 15 min to decrease pain and inflammation   HEP  Continue with current Home Exercise Program.  See Objective section for progression of HEP. Comments:       POST-PAIN    Pain Rating (0-10 pain scale):   Location and Pain Description same as pre-pain unless otherwise indicated. Action: [] NA  [] Call Physician  [] Perform HEP  [] Meds as prescribed     ASSESSMENT:     Conditions Requiring Skilled Therapeutic Intervention  Body structures, Functions, Activity limitations: Decreased ROM, Decreased strength, Increased pain, Decreased posture, Decreased endurance  Assessment: Continued current POC for left shoulder mobility and postural strength. Initiated PROM to left shoulder in all planes. Patient demonstrates moderate muscle guarding. Firm end feel noted at end range flexion/ER. Treatment Diagnosis: impaired left shoulder ROM, impaired left shoulder strength, left shoulder pain  Tolerance to treatment:  [x] Good   [] Fair   [] Poor  [] Fatigued   [] Increased pain   Limited by:    Goals:  Long term goals  Time Frame for Long term goals : 12 visits  Long term goal 1: Patient will report </= 1/10 pain in left shoulder with work and functional activities. Long term goal 2: Patient will increase left shoulder ROM to [facility] for improved reaching tolerance. Long term goal 3: Patient will increase strength in left shoulder to 5/5 for improved lifting tolerance. Long term goal 4: UEFI >/= 60/80 to demonstrate functional improvements. Long term goal 5: Patient will be independent with HEP. Progress toward goals:continue towards all   Goals Met:    []  See updated POC   Comments:    PLAN:  [x] Continue POC to pt tolerance  [] Hold PT for ___ days.   See note to physician  [] Discharge PT    Signature:   Electronically signed by Miryam Garrett PTA on 11/29/21 at 10:44 AM EST    PT Individual Minutes  Time In: 1000  Time Out: 1110  Minutes: 70  Timed Code Treatment Minutes: 55 Minutes  Modality Time In Time Out Total Minutes Units   Ther ex ([phone]   Manual Therapy ([phone] 1   Estim unattended   [phone] 15 1

## 2021-12-02 ENCOUNTER — HOSPITAL ENCOUNTER (OUTPATIENT)
Dept: PHYSICAL THERAPY | Age: 56
Setting detail: THERAPIES SERIES
Discharge: HOME | End: 2021-12-02
Payer: COMMERCIAL

## 2021-12-02 PROCEDURE — 97140 MANUAL THERAPY 1/> REGIONS: CPT

## 2021-12-02 PROCEDURE — 97035 APP MDLTY 1+ULTRASOUND EA 15: CPT

## 2021-12-02 PROCEDURE — 97110 THERAPEUTIC EXERCISES: CPT

## 2021-12-02 PROCEDURE — G0283 ELEC STIM OTHER THAN WOUND: HCPCS

## 2021-12-02 ASSESSMENT — PAIN DESCRIPTION - ORIENTATION: ORIENTATION: LEFT

## 2021-12-02 ASSESSMENT — PAIN DESCRIPTION - LOCATION: LOCATION: SHOULDER

## 2021-12-02 ASSESSMENT — PAIN SCALES - GENERAL: PAINLEVEL_OUTOF10: 4

## 2021-12-02 NOTE — PROGRESS NOTES
[facility]   Outpatient Physical Therapy   Treatment Note  [] [facility]  [x] [address] [facility]  Date: 2021  Patient: Laquita Singer  : 1965  ACCT #: [de-identified]  Referring Practitioner: Fei LOCKE  Diagnosis: Strain of muscle and tendon of ther rotator cuff    Visit Information:  PT Visit Information  Onset Date: 10/05/21  PT Insurance Information: [facility]  Total # of Visits Approved: 12  Total # of Visits to Date: 4  Plan of Care/Certification Expiration Date: 22  No Show: 0  Canceled Appointment: 0  Progress Note Counter:  [facility] (approved from 21 to 22)    SUBJECTIVE:   Subjective  Subjective: Pt reports increase pain with overhead and pulling to the side increase pain to 9/10   HEP Compliance:  [x] Good [] Fair [] Poor [] Reports not doing due to:    PAIN   Location:   Pain Location: Shoulder  Pain Rating (0-10 pain scale):  Pain Level: 4  Pain Description:     Action:  [x] Acceptable for treatment  []  Other:    OBJECTIVE:   Exercises  Exercise 3: posture ex x 15  Exercise 4: US to anterior left shoulder- see modalities  Exercise 20: HEP: ice as needed    Manual: []  NA   Manual therapy  Soft Tissue Mobalization: left shoulder     Modalities: []  NA  Modalities  Ultrasound: left ant shoulder 1.2 w/cm2, 3 MHz, continous x 8 min  E-stim (parameters): IFC/CP x 15 min to decrease pain and inflammation     HEP  Continue with current Home Exercise Program.  See Objective section for progression of HEP. Comments:       POST-PAIN    Pain Rating (0-10 pain scale): 2-3  Location and Pain Description same as pre-pain unless otherwise indicated.   Action: [] NA  [] Call Physician  [x] Perform HEP  [] Meds as prescribed     ASSESSMENT:     Conditions Requiring Skilled Therapeutic Intervention  Body structures, Functions, Activity limitations: Decreased ROM, Decreased strength, Increased pain, Decreased posture, Decreased endurance  Assessment: Noted increase fibrous tissue left arm musculature. Utilized manual and modalities for relief. Pt reported decrease pain post treatment. Treatment Diagnosis: impaired left shoulder ROM, impaired left shoulder strength, left shoulder pain     Tolerance to treatment:  [x] Good   [] Fair   [] Poor  [] Fatigued   [] Increased pain   Limited by:    Goals:        Long term goals  Time Frame for Long term goals : 12 visits  Long term goal 1: Patient will report </= 1/10 pain in left shoulder with work and functional activities. Long term goal 2: Patient will increase left shoulder ROM to [facility] for improved reaching tolerance. Long term goal 3: Patient will increase strength in left shoulder to 5/5 for improved lifting tolerance. Long term goal 4: UEFI >/= 60/80 to demonstrate functional improvements. Long term goal 5: Patient will be independent with HEP. Progress toward goals:  Goals Met:    []  See updated POC   Comments:    PLAN:  [x] Continue POC to pt tolerance  [] Hold PT for ___ days.   See note to physician  [] Discharge PT    Signature:   Electronically signed by Amada Zarate PTA on 12/2/21 at 3:11 PM EST    PT Individual Minutes  Time In: 5546  Time Out: 1600  Minutes: 55  Timed Code Treatment Minutes: 40 Minutes            Modality Time In Time Out Total Minutes Units   Ther ex (94114) 9418 3060 02 1   Manual Therapy (71786) 3513 3025 30 1   ultrasound 8004 0944 3 1   Estim unattended   (04256) 1502 0447 34 9

## 2021-12-06 ENCOUNTER — HOSPITAL ENCOUNTER (OUTPATIENT)
Dept: PHYSICAL THERAPY | Age: 56
Setting detail: THERAPIES SERIES
Discharge: HOME | End: 2021-12-06
Payer: COMMERCIAL

## 2021-12-06 PROCEDURE — G0283 ELEC STIM OTHER THAN WOUND: HCPCS

## 2021-12-06 PROCEDURE — 97032 APPL MODALITY 1+ESTIM EA 15: CPT

## 2021-12-06 PROCEDURE — 97140 MANUAL THERAPY 1/> REGIONS: CPT

## 2021-12-06 PROCEDURE — 97110 THERAPEUTIC EXERCISES: CPT

## 2021-12-06 ASSESSMENT — PAIN DESCRIPTION - PAIN TYPE: TYPE: CHRONIC PAIN

## 2021-12-06 ASSESSMENT — PAIN DESCRIPTION - LOCATION: LOCATION: SHOULDER

## 2021-12-06 ASSESSMENT — PAIN DESCRIPTION - FREQUENCY: FREQUENCY: CONTINUOUS

## 2021-12-06 ASSESSMENT — PAIN SCALES - GENERAL: PAINLEVEL_OUTOF10: 4

## 2021-12-06 ASSESSMENT — PAIN DESCRIPTION - ORIENTATION: ORIENTATION: LEFT

## 2021-12-06 ASSESSMENT — PAIN DESCRIPTION - DESCRIPTORS: DESCRIPTORS: ACHING;SORE

## 2021-12-06 NOTE — PROGRESS NOTES
[facility]   Outpatient Physical Therapy   Treatment Note  [] [address]  [x] [address] [facility]  Date: 2021  Patient: Uzma Rosen  : 1965  ACCT #: [de-identified]  Referring Practitioner: Tonia LOCKE  Diagnosis: Strain of muscle and tendon of ther rotator cuff  Treatment Diagnosis: impaired left shoulder ROM, impaired left shoulder strength, left shoulder pain     Visit Information:  PT Visit Information  Onset Date: 10/05/21  PT Insurance Information: [facility]  Total # of Visits Approved: 12  Total # of Visits to Date: 5  Plan of Care/Certification Expiration Date: 22  No Show: 0  Canceled Appointment: 0  Progress Note Counter:  [facility] (approved from 21 to 22)    SUBJECTIVE:   Subjective  Subjective: Patient states he had a follow up appt with [facility] doctor on . Notes he is suppose to return x1 week. Denies change in symptoms. States he has been wearing posture support brace and using TENs unit. HEP Compliance:  [x] Good [] Fair [] Poor [] Reports not doing due to:    PAIN   Location:   Pain Location: Shoulder  Pain Rating (0-10 pain scale):  Pain Level: 4  Pain Description:  Pain Descriptors: Aching, Sore  Action:  [] Acceptable for treatment  []  Other:    OBJECTIVE:   Exercises  Exercise 2: shrugs, rolls YTB x10  Exercise 3: Rows/lats YTB x15  Exercise 4: US to anterior left shoulder- see modalities  Exercise 5: pulleys 2 minea flex, abd    Manual: []  NA   Manual therapy  PROM: left shoulder gentle all planes 8 minutes  Soft Tissue Mobalization: left anterior/lateral shoulder x 8 minutes    Modalities: []  NA  Modalities  Ultrasound: left ant shoulder 1.2 w/cm2, 3 MHz, continous x 8 min  E-stim (parameters): IFC/CP x 15 min to decrease pain and inflammation   HEP  Continue with current Home Exercise Program.  See Objective section for progression of HEP.       Comments:       POST-PAIN    Pain Rating (0-10 pain

## 2021-12-09 ENCOUNTER — HOSPITAL ENCOUNTER (OUTPATIENT)
Dept: PHYSICAL THERAPY | Age: 56
Setting detail: THERAPIES SERIES
Discharge: HOME | End: 2021-12-09
Payer: COMMERCIAL

## 2021-12-09 PROCEDURE — 97140 MANUAL THERAPY 1/> REGIONS: CPT

## 2021-12-09 PROCEDURE — 97035 APP MDLTY 1+ULTRASOUND EA 15: CPT

## 2021-12-09 PROCEDURE — 97110 THERAPEUTIC EXERCISES: CPT

## 2021-12-09 ASSESSMENT — PAIN SCALES - GENERAL: PAINLEVEL_OUTOF10: 4

## 2021-12-09 ASSESSMENT — PAIN DESCRIPTION - ORIENTATION: ORIENTATION: LEFT

## 2021-12-09 ASSESSMENT — PAIN DESCRIPTION - LOCATION: LOCATION: SHOULDER

## 2021-12-09 ASSESSMENT — PAIN DESCRIPTION - DESCRIPTORS: DESCRIPTORS: ACHING

## 2021-12-09 ASSESSMENT — PAIN DESCRIPTION - PAIN TYPE: TYPE: CHRONIC PAIN

## 2021-12-09 NOTE — PROGRESS NOTES
[facility]   Outpatient Physical Therapy   Treatment Note  [] [address]  [x] [facility]  Date: 2021  Patient: Chao Lowe  : 1965  ACCT #: [de-identified]  Referring Practitioner: Elissa LOCKE  Diagnosis: Strain of muscle and tendon of ther rotator cuff  Treatment Diagnosis: impaired left shoulder ROM, impaired left shoulder strength, left shoulder pain     Visit Information:  PT Visit Information  Onset Date: 10/05/21  PT Insurance Information: [facility]  Total # of Visits Approved: 12  Total # of Visits to Date: 6  Plan of Care/Certification Expiration Date: 22  No Show: 0  Canceled Appointment: 0  Progress Note Counter:  [facility] (approved from 21 to 22)    SUBJECTIVE:   Subjective  Subjective: Pt reports that the pain is about the same. HEP Compliance:  [x] Good [] Fair [] Poor [] Reports not doing due to:    PAIN   Location:   Pain Location: Shoulder  Pain Rating (0-10 pain scale):  Pain Level: 4  Pain Description:  Pain Descriptors: Aching  Action:  [x] Acceptable for treatment  []  Other:    OBJECTIVE:   Exercises  Exercise 2: shrugs, rolls YTB x10  Exercise 3: Rows/lats YTB x15  Exercise 4: US to anterior left shoulder- see modalities  Exercise 5: pulleys 2 minea flex, abd  Exercise 20: HEp cont current     Manual: []  NA   Manual therapy  PROM: left shoulder gentle all planes 8 minutes  Soft Tissue Mobalization: left anterior/lateral shoulder x 8 minutes    Modalities: []  NA  Modalities  Ultrasound: left ant shoulder 1.2 w/cm2, 3 MHz, continous x 8 min  E-stim (parameters): has tens unit at home        ROM: [] NT      AROM LUE (degrees)  L Shoulder Flexion 0-180: 118  L Shoulder ABduction 0-180: 125  L Shoulder Int Rotation  0-70: 25  L Shoulder Ext Rotation  0-90: 70       HEP  Continue with current Home Exercise Program.  See Objective section for progression of HEP.       Comments:       POST-PAIN Pain Rating (0-10 pain scale): 0/10  Location and Pain Description same as pre-pain unless otherwise indicated. Action: [] NA  [] Call Physician  [x] Perform HEP  [] Meds as prescribed     ASSESSMENT:     Conditions Requiring Skilled Therapeutic Intervention  Body structures, Functions, Activity limitations: Decreased ROM, Decreased strength, Increased pain, Decreased posture, Decreased endurance  Assessment: Pt reports some increased tightness pain with laying supine. Pt with slight increased ROm on all planes in shoulder. Treatment Diagnosis: impaired left shoulder ROM, impaired left shoulder strength, left shoulder pain        Tolerance to treatment:  [x] Good   [] Fair   [] Poor  [] Fatigued   [] Increased pain   Limited by:    Goals:        Long term goals  Time Frame for Long term goals : 12 visits  Long term goal 1: Patient will report </= 1/10 pain in left shoulder with work and functional activities. Long term goal 2: Patient will increase left shoulder ROM to [facility] for improved reaching tolerance. Long term goal 3: Patient will increase strength in left shoulder to 5/5 for improved lifting tolerance. Long term goal 4: UEFI >/= 60/80 to demonstrate functional improvements. Long term goal 5: Patient will be independent with HEP. Progress toward goals: rom  Goals Met:    []  See updated POC   Comments:    PLAN:  [x] Continue POC to pt tolerance  [] Hold PT for ___ days.   See note to physician  [] Discharge PT    Signature:   Electronically signed by Bart Humphreys PTA on 12/9/21 at 10:59 AM EST    PT Individual Minutes  Time In: 1000  Time Out: 2001  Minutes: 45  Timed Code Treatment Minutes: 45 Minutes        Modality Time In Time Out Total Minutes Units   Ther ex (16915) 7841 0874 27 2   Manual Therapy (23161) 0018 1051 10 1    [id number] 8 7

## 2021-12-13 ENCOUNTER — HOSPITAL ENCOUNTER (OUTPATIENT)
Dept: PHYSICAL THERAPY | Age: 56
Setting detail: THERAPIES SERIES
Discharge: HOME | End: 2021-12-13
Payer: COMMERCIAL

## 2021-12-13 PROCEDURE — 97110 THERAPEUTIC EXERCISES: CPT

## 2021-12-13 PROCEDURE — 97140 MANUAL THERAPY 1/> REGIONS: CPT

## 2021-12-13 ASSESSMENT — PAIN SCALES - GENERAL: PAINLEVEL_OUTOF10: 4

## 2021-12-13 ASSESSMENT — PAIN DESCRIPTION - ORIENTATION: ORIENTATION: LEFT

## 2021-12-13 ASSESSMENT — PAIN DESCRIPTION - LOCATION: LOCATION: SHOULDER

## 2021-12-13 ASSESSMENT — PAIN DESCRIPTION - DESCRIPTORS: DESCRIPTORS: ACHING

## 2021-12-16 ENCOUNTER — HOSPITAL ENCOUNTER (OUTPATIENT)
Dept: PHYSICAL THERAPY | Age: 56
Setting detail: THERAPIES SERIES
Discharge: HOME | End: 2021-12-16
Payer: COMMERCIAL

## 2021-12-16 PROCEDURE — 97110 THERAPEUTIC EXERCISES: CPT

## 2021-12-16 PROCEDURE — 97140 MANUAL THERAPY 1/> REGIONS: CPT

## 2021-12-16 PROCEDURE — G0283 ELEC STIM OTHER THAN WOUND: HCPCS

## 2021-12-16 ASSESSMENT — PAIN SCALES - GENERAL: PAINLEVEL_OUTOF10: 4

## 2021-12-16 ASSESSMENT — PAIN DESCRIPTION - LOCATION: LOCATION: SHOULDER

## 2021-12-16 ASSESSMENT — PAIN DESCRIPTION - DESCRIPTORS: DESCRIPTORS: ACHING

## 2021-12-16 ASSESSMENT — PAIN DESCRIPTION - ORIENTATION: ORIENTATION: LEFT

## 2021-12-16 NOTE — PROGRESS NOTES
Perform HEP  [] Meds as prescribed     ASSESSMENT:     Conditions Requiring Skilled Therapeutic Intervention  Body structures, Functions, Activity limitations: Decreased ROM, Decreased strength, Increased pain, Decreased posture, Decreased endurance  Assessment: Pt with some increased pain with doorway stretch stopped exercises. Pt  stated pain was better at end of sesssion. Pt with improving ROM. Tolerance to treatment:  [x] Good   [] Fair   [] Poor  [] Fatigued   [] Increased pain   Limited by:    Goals:        Long term goals  Time Frame for Long term goals : 12 visits  Long term goal 1: Patient will report </= 1/10 pain in left shoulder with work and functional activities. Long term goal 2: Patient will increase left shoulder ROM to [facility] for improved reaching tolerance. Long term goal 3: Patient will increase strength in left shoulder to 5/5 for improved lifting tolerance. Long term goal 4: UEFI >/= 60/80 to demonstrate functional improvements. Long term goal 5: Patient will be independent with HEP. Progress toward goals: rom  Goals Met:    []  See updated POC   Comments:    PLAN:  [x] Continue POC to pt tolerance  [] Hold PT for ___ days.   See note to physician  [] Discharge PT    Signature:   Electronically signed by Zakiya Dhaliwal PTA on 12/16/21 at 1:00 PM EST    PT Individual Minutes  Time In: 1000  Time Out: 2992  Minutes: 55  Timed Code Treatment Minutes: 40 Minutes  Modality Time In Time Out Total Minutes Units   Ther ex (34461) 1000 1032 32 2   Manual Therapy ( 8 1   Estim unattended   66 31 35

## 2021-12-20 ENCOUNTER — HOSPITAL ENCOUNTER (OUTPATIENT)
Dept: PHYSICAL THERAPY | Age: 56
Setting detail: THERAPIES SERIES
Discharge: HOME | End: 2021-12-20
Payer: COMMERCIAL

## 2021-12-20 PROCEDURE — 97035 APP MDLTY 1+ULTRASOUND EA 15: CPT

## 2021-12-20 PROCEDURE — 97140 MANUAL THERAPY 1/> REGIONS: CPT

## 2021-12-20 PROCEDURE — 97110 THERAPEUTIC EXERCISES: CPT

## 2021-12-20 ASSESSMENT — PAIN DESCRIPTION - ORIENTATION: ORIENTATION: LEFT

## 2021-12-20 ASSESSMENT — PAIN DESCRIPTION - DESCRIPTORS: DESCRIPTORS: ACHING

## 2021-12-20 ASSESSMENT — PAIN SCALES - GENERAL: PAINLEVEL_OUTOF10: 2

## 2021-12-20 ASSESSMENT — PAIN DESCRIPTION - LOCATION: LOCATION: SHOULDER

## 2021-12-20 NOTE — PROGRESS NOTES
limitations: Decreased ROM,Decreased strength,Increased pain,Decreased posture,Decreased endurance  Assessment: Pt with no increased pain this date. Decreased swelling noted in shoulder. Held pec stretch this date do to increaseing pain with it. Treatment Diagnosis: impaired left shoulder ROM, impaired left shoulder strength, left shoulder pain        Tolerance to treatment:  [x] Good   [] Fair   [] Poor  [] Fatigued   [] Increased pain   Limited by:    Goals:        Long term goals  Time Frame for Long term goals : 12 visits  Long term goal 1: Patient will report </= 1/10 pain in left shoulder with work and functional activities. Long term goal 2: Patient will increase left shoulder ROM to [facility] for improved reaching tolerance. Long term goal 3: Patient will increase strength in left shoulder to 5/5 for improved lifting tolerance. Long term goal 4: UEFI >/= 60/80 to demonstrate functional improvements. Long term goal 5: Patient will be independent with HEP. Progress toward goals: rom  Goals Met:    []  See updated POC   Comments:    PLAN:  [x] Continue POC to pt tolerance  [] Hold PT for ___ days.   See note to physician  [] Discharge PT    Signature:   Electronically signed by Ronald Coy PTA on 12/20/21 at 1:20 PM EST    PT Individual Minutes  Time In: 1000  Time Out: 5167  Minutes: 48       Modality Time In Time Out Total Minutes Units   Ther ex (71600) 7500 2825 30 1   Manual Therapy (62824) 3734 4547 10 1    9360 0728 2 4

## 2021-12-23 ENCOUNTER — HOSPITAL ENCOUNTER (OUTPATIENT)
Dept: PHYSICAL THERAPY | Age: 56
Setting detail: THERAPIES SERIES
Discharge: HOME | End: 2021-12-23
Payer: COMMERCIAL

## 2021-12-23 PROCEDURE — 97110 THERAPEUTIC EXERCISES: CPT

## 2021-12-23 PROCEDURE — 97035 APP MDLTY 1+ULTRASOUND EA 15: CPT

## 2021-12-23 PROCEDURE — 97140 MANUAL THERAPY 1/> REGIONS: CPT

## 2021-12-23 ASSESSMENT — PAIN DESCRIPTION - DESCRIPTORS: DESCRIPTORS: TIGHTNESS;ACHING

## 2021-12-23 ASSESSMENT — PAIN DESCRIPTION - ORIENTATION: ORIENTATION: LEFT

## 2021-12-23 ASSESSMENT — PAIN SCALES - GENERAL: PAINLEVEL_OUTOF10: 4

## 2021-12-23 ASSESSMENT — PAIN DESCRIPTION - LOCATION: LOCATION: SHOULDER

## 2021-12-23 NOTE — PROGRESS NOTES
[facility]   Outpatient Physical Therapy   Treatment Note  [] [address]  [x] [address] [facility]  Date: 2021  Patient: Laquita Singer  : 1965  ACCT #: [de-identified]  Referring Practitioner: Fei LOCKE  Diagnosis: Strain of muscle and tendon of ther rotator cuff  Treatment Diagnosis: impaired left shoulder ROM, impaired left shoulder strength, left shoulder pain     Visit Information:  PT Visit Information  Onset Date: 10/05/21  PT Insurance Information: [facility]  Total # of Visits Approved: 12  Total # of Visits to Date: 10  Plan of Care/Certification Expiration Date: 22  No Show: 0  Canceled Appointment: 0  Progress Note Counter: 10/12 [facility] (approved from 21 to 22)    SUBJECTIVE:   Subjective  Subjective: Pt with cont difficulty putting coat on, causing increased pain. HEP Compliance:  [x] Good [] Fair [] Poor [] Reports not doing due to:    PAIN   Location:   Pain Location: Shoulder  Pain Rating (0-10 pain scale):  Pain Level: 4  Pain Description:  Pain Descriptors: Tightness,Aching  Action:  [x] Acceptable for treatment  []  Other:    OBJECTIVE:   Exercises  Exercise 5: pulleys 2 minea flex, abd  Exercise 7: siiting posture exercises   Exercise 20: Hep cont current     Manual: []  NA   Manual therapy  Soft Tissue Mobalization: left anterior/lateral shoulder     Modalities: []  NA  Modalities  Ultrasound: left ant shoulder 1.2 w/cm2, 3 MHz, continous x 8 min       HEP  Continue with current Home Exercise Program.  See Objective section for progression of HEP. Comments:       POST-PAIN    Pain Rating (0-10 pain scale): 4/10  Location and Pain Description same as pre-pain unless otherwise indicated.   Action: [] NA  [] Call Physician  [] Perform HEP  [] Meds as prescribed     ASSESSMENT:     Conditions Requiring Skilled Therapeutic Intervention  Body structures, Functions, Activity limitations: Decreased ROM,Decreased strength,Increased pain,Decreased posture,Decreased endurance  Assessment: Pt  with cont sig pain with exercises. Modified treatment per pts oswaldo. Told pt to call dr for follow up also to see MRI status   Treatment Diagnosis: impaired left shoulder ROM, impaired left shoulder strength, left shoulder pain        Tolerance to treatment:  [] Good   [x] Fair   [] Poor  [] Fatigued   [] Increased pain   Limited by:    Goals:        Long term goals  Time Frame for Long term goals : 12 visits  Long term goal 1: Patient will report </= 1/10 pain in left shoulder with work and functional activities. Long term goal 2: Patient will increase left shoulder ROM to [facility] for improved reaching tolerance. Long term goal 3: Patient will increase strength in left shoulder to 5/5 for improved lifting tolerance. Long term goal 4: UEFI >/= 60/80 to demonstrate functional improvements. Long term goal 5: Patient will be independent with HEP. Progress toward goals: rom  Goals Met:    []  See updated POC   Comments:    PLAN:  [x] Continue POC to pt tolerance  [] Hold PT for ___ days.   See note to physician  [] Discharge PT    Signature:   Electronically signed by Aliya Weiner PTA on 12/23/21 at 1:16 PM EST    PT Individual Minutes  Time In: 1000  Time Out: 0356  Minutes: 40  Timed Code Treatment Minutes: 40 Minutes    Modality Time In Time Out Total Minutes Units   Ther ex (89587) 1000 1013 15 1   Manual Therapy (71171) 5280 2598 03 9   us 6907 6235 0 3

## 2021-12-27 ENCOUNTER — HOSPITAL ENCOUNTER (OUTPATIENT)
Dept: PHYSICAL THERAPY | Age: 56
Setting detail: THERAPIES SERIES
Discharge: HOME | End: 2021-12-27
Payer: COMMERCIAL

## 2021-12-27 PROCEDURE — 97110 THERAPEUTIC EXERCISES: CPT

## 2021-12-27 PROCEDURE — 97140 MANUAL THERAPY 1/> REGIONS: CPT

## 2021-12-27 ASSESSMENT — PAIN SCALES - GENERAL: PAINLEVEL_OUTOF10: 3

## 2021-12-27 NOTE — PROGRESS NOTES
[facility]   Outpatient Physical Therapy   Treatment Note  [] [address]  [] [address] [facility]  Date: 2021  Patient: Mago Colmenares  : 1965  ACCT #: [de-identified]  Referring Practitioner: Vianney LOCKE  Diagnosis: Strain of muscle and tendon of ther rotator cuff  Treatment Diagnosis: impaired left shoulder ROM, impaired left shoulder strength, left shoulder pain     Visit Information:  PT Visit Information  Onset Date: 10/05/21  PT Insurance Information: [facility]  Total # of Visits Approved: 12  Total # of Visits to Date: 11  Plan of Care/Certification Expiration Date: 22  No Show: 0  Canceled Appointment: 0  Progress Note Counter:  [facility] (approved from 21 to 22)    SUBJECTIVE:   Subjective  Subjective: Patient reports his MRI is scheduled for 2022. Denies change in symptoms since starting therapy. Reports decreased pain this visit. Notes he has been off work x4 days. HEP Compliance:  [] Good [] Fair [] Poor [] Reports not doing due to:    PAIN   Location:      Pain Rating (0-10 pain scale):  Pain Level: 3  Pain Description:     Action:  [] Acceptable for treatment  []  Other:    OBJECTIVE:   Exercises  Exercise 3: Rows/lats YTB x15  Exercise 5: pulleys 2 minea flex, abd  Exercise 6: wand ex standing  flexion/abduction/IR/ER 5\"x10  Exercise 7: prone row/lats over Total gym x10  Exercise 8: finger ladder flexion x3    Manual: []  NA   Manual therapy  PROM: left shoulder gentle ER/IR 5 minutes  Soft Tissue Mobalization: left anterior/lateral shoulder   Other: manual total 10 minutes  ROM: [] NT  AROM LUE (degrees)  L Shoulder Flexion 0-180: 134 degrees standing  L Shoulder ABduction 0-180: 110 standing  L Shoulder Int Rotation  0-70: 58 at 90 degrees abduction  L Shoulder Ext Rotation  0-90: 34 at 90 degrees abduction  HEP  Continue with current Home Exercise Program.  See Objective section for progression of HEP. Comments:       POST-PAIN    Pain Rating (0-10 pain scale):  3/10  Location and Pain Description same as pre-pain unless otherwise indicated. Action: [] NA  [] Call Physician  [] Perform HEP  [] Meds as prescribed     ASSESSMENT:     Conditions Requiring Skilled Therapeutic Intervention  Body structures, Functions, Activity limitations: Decreased ROM,Decreased strength,Increased pain,Decreased posture,Decreased endurance  Assessment: Patient demonstrates decline in left shoulder AROM ER since last measurement. Session focused on AAROM exercises for shoulder mobility. Patient notes intermittent shoulder \"catching\" during abduction. Treatment Diagnosis: impaired left shoulder ROM, impaired left shoulder strength, left shoulder pain  Tolerance to treatment:  [x] Good   [] Fair   [] Poor  [] Fatigued   [] Increased pain   Limited by:    Goals:  Long term goals  Time Frame for Long term goals : 12 visits  Long term goal 1: Patient will report </= 1/10 pain in left shoulder with work and functional activities. Long term goal 2: Patient will increase left shoulder ROM to [facility] for improved reaching tolerance. Long term goal 3: Patient will increase strength in left shoulder to 5/5 for improved lifting tolerance. Long term goal 4: UEFI >/= 60/80 to demonstrate functional improvements. Long term goal 5: Patient will be independent with HEP. Progress toward goals:continue towards all   Goals Met:    []  See updated POC   Comments:    PLAN:  [x] Continue POC to pt tolerance  [] Hold PT for ___ days.   See note to physician  [] Discharge PT    Signature:   Electronically signed by Jamilah Connelly PTA on 12/27/21 at 10:33 AM EST    PT Individual Minutes  Time In: 7798  Time Out: 4648  Minutes: 47  Timed Code Treatment Minutes: 47 Minutes        Modality Time In Time Out Total Minutes Units   Ther ex (47611) 2298 6577 48 2   Manual Therapy (49975) 5762 3420 45 5

## 2021-12-30 ENCOUNTER — HOSPITAL ENCOUNTER (OUTPATIENT)
Dept: PHYSICAL THERAPY | Age: 56
Setting detail: THERAPIES SERIES
Discharge: HOME | End: 2021-12-30
Payer: COMMERCIAL

## 2021-12-30 PROCEDURE — 97140 MANUAL THERAPY 1/> REGIONS: CPT

## 2021-12-30 PROCEDURE — 97110 THERAPEUTIC EXERCISES: CPT

## 2021-12-30 PROCEDURE — G0283 ELEC STIM OTHER THAN WOUND: HCPCS

## 2021-12-30 ASSESSMENT — PAIN DESCRIPTION - ORIENTATION: ORIENTATION: LEFT;ANTERIOR

## 2021-12-30 ASSESSMENT — PAIN DESCRIPTION - LOCATION: LOCATION: SHOULDER

## 2021-12-30 ASSESSMENT — PAIN DESCRIPTION - DESCRIPTORS: DESCRIPTORS: THROBBING

## 2021-12-30 ASSESSMENT — PAIN SCALES - GENERAL: PAINLEVEL_OUTOF10: 3

## 2021-12-30 NOTE — PROGRESS NOTES
[facility]   Outpatient Physical Therapy   Treatment Note  [] [address]  [x] [facility]            of [address]  Date: 2021  Patient: Ana Luisa Vital  : 1965  ACCT #: [de-identified]  Referring Practitioner: Lexa LOCKE  Diagnosis: Strain of muscle and tendon of ther rotator cuff    Visit Information:  PT Visit Information  Onset Date: 10/05/21  PT Insurance Information: [facility]  Total # of Visits Approved: 12  Total # of Visits to Date: 12  Plan of Care/Certification Expiration Date: 22  No Show: 0  Canceled Appointment: 0  Progress Note Counter:  [facility] (approved from 21 to 22)    SUBJECTIVE:   Subjective  Subjective: Pt reports pain at night with sleeping position which results in increase pain in the mornings. Pt reports pain in the mornings typical 5/10 pain. HEP Compliance:  [x] Good [] Fair [] Poor [] Reports not doing due to:    PAIN   Location:   Pain Location: Shoulder  Pain Rating (0-10 pain scale):  Pain Level: 3  Pain Description:  Pain Descriptors:  Throbbing  Action:  [x] Acceptable for treatment  []  Other:    OBJECTIVE:   Exercises  Exercise 2: posture ex x 15  Exercise 3: Rows/lats YTB x15  Exercise 5: pulleys 3 min ea flex, abd  Exercise 6: wand ex supine flex, abd x 10  Exercise 7: prone row/lats over Total gym x10  Exercise 8: finger ladder flexion x4  Exercise 20: HEP: cont current as oswaldo    Manual: []  NA   Manual therapy  PROM: gentle left shldr all planes  Soft Tissue Mobalization: left shldr musculature    Modalities: []  NA  Modalities  E-stim (parameters): IFC and CP supine to decrease pain        Strength: [] NT  Strength LUE  Comment: muscle tremors noted with MMT  L Shoulder Flexion: 4/5  L Shoulder Extension: 4/5  L Shoulder ABduction: 4/5  L Shoulder Internal Rotation: 4/5  L Shoulder External Rotation: 4/5  L Elbow Flexion: 4/5  L Elbow Extension: 4/5       ROM: [] NT     AROM LUE (degrees)  L Shoulder Flexion 0-180: 145  L Shoulder ABduction 0-180: 140  L Shoulder Int Rotation  0-70: 50  L Shoulder Ext Rotation  0-90: 40       HEP  Continue with current Home Exercise Program.  See Objective section for progression of HEP. Comments:       POST-PAIN    Pain Rating (0-10 pain scale): 2  Location and Pain Description same as pre-pain unless otherwise indicated. Action: [] NA  [] Call Physician  [x] Perform HEP  [] Meds as prescribed     ASSESSMENT:     Conditions Requiring Skilled Therapeutic Intervention  Body structures, Functions, Activity limitations: Decreased ROM,Decreased strength,Increased pain,Decreased posture,Decreased endurance  Assessment: Pt reports pain at work up to 5/10. Pt has MRI scheduled in two weeks. Would benefit from additional therapy to prevent further ROM restrictions and to progress strengthening as able. Treatment Diagnosis: impaired left shoulder ROM, impaired left shoulder strength, left shoulder pain  Exam: UEFI 43/80     Tolerance to treatment:  [] Good   [x] Fair   [] Poor  [] Fatigued   [] Increased pain   Limited by:    Goals:        Long term goals  Time Frame for Long term goals : 12 visits  Long term goal 1: Patient will report </= 1/10 pain in left shoulder with work and functional activities. Long term goal 2: Patient will increase left shoulder ROM to [facility] for improved reaching tolerance. Long term goal 3: Patient will increase strength in left shoulder to 5/5 for improved lifting tolerance. Long term goal 4: UEFI >/= 60/80 to demonstrate functional improvements. Long term goal 5: Patient will be independent with HEP. Progress toward goals:  Goals Met:    [x]  See updated POC   Comments:    PLAN:  [x] Continue POC to pt tolerance  [] Hold PT for ___ days.   See note to physician  [] Discharge PT    Signature:   Electronically signed by Arturo Pearce PTA on 12/30/21 at 1:05 PM EST    PT Individual Minutes  Time In: 1300  Time Out: 1400  Minutes: 60  Timed Code Treatment Minutes: 45 Minutes            Activity Minutes Units   Ther-ex 35 2   Manual  10 1   IFC/HP 15 1

## 2021-12-30 NOTE — PROGRESS NOTES
[address]   [address] [place], [place]  Phone:  [phone]   Fax:  [phone]    [] Certification  [] Recertification [x]  Plan of Care  [] Progress Note   [] Discharge        To: Robert LOCKE  From:  Adelia Pal, PT  Patient: Coty Braga     : 1965  Diagnosis: Strain of muscle and tendon of ther rotator cuff     Date: 2021  Treatment Diagnosis: impaired left shoulder ROM, impaired left shoulder strength, left shoulder pain    Plan of Care/Certification Expiration Date: 22  Progress Report Period from: 21   to 2021    Total # of Visits to Date: 12   No Show: 0    Canceled Appointment: 0     OBJECTIVE:   Short Term Goals =Long term goals    Long Term Goals - Time Frame for Long term goals : 12 visits  Goals Current/ Discharge status Met   Long term goal 1: Patient will report </= 1/10 pain in left shoulder with work and functional activities. Pt reports pain up to 5/10 with work and functional activities [] yes  [x] no   Long term goal 2: Patient will increase left shoulder ROM to [facility] for improved reaching tolerance. AROM LUE (degrees)  L Shoulder Flexion 0-180: 145  L Shoulder ABduction 0-180: 140  L Shoulder Int Rotation  0-70: 50  L Shoulder Ext Rotation  0-90: 40        [] yes  [x] no   Long term goal 3: Patient will increase strength in left shoulder to 5/5 for improved lifting tolerance. Strength LUE  Comment: muscle tremors noted with MMT  L Shoulder Flexion: 4/5  L Shoulder Extension: 4/5  L Shoulder ABduction: 4/5  L Shoulder Internal Rotation: 4/5  L Shoulder External Rotation: 4/5  L Elbow Flexion: 4/5  L Elbow Extension: 4/5      [] yes  [x] no   Long term goal 4: UEFI >/= 60/80 to demonstrate functional improvements. UEFI 43/80 [] yes  [x] no   Long term goal 5: Patient will be independent with HEP.  Pt indep with HEP [x] yes  [] no     Body structures, Functions, Activity limitations: Decreased ROM,Decreased strength,Increased pain,Decreased posture,Decreased endurance  Assessment: Pt reports pain at work up to 5/10. Pt has MRI scheduled in two weeks. Would benefit from additional therapy to prevent further ROM restrictions and to progress strengthening as able. New Education Provided:  continue with HEP    PLAN: [x] Evaluate and Treat  Frequency/Duration:  Plan  Times per week: 2  Plan weeks: 4  Current Treatment Recommendations: Shane Corley Re-education,Manual Therapy - Soft Tissue Mobilization,Manual Therapy - Joint Manipulation,Home Exercise Program,Patient/Caregiver Education & Training,Modalities     Precautions:             Patient Status:[] Continue/ Initate plan of Care    [] Discharge PT. Recommend pt continue with HEP. [x] Additional visits requested, Please re-certify for additional visits:        Signature: OBJ INFO BY: Electronically signed by Eusebia Rao PTA on 12/30/21 at 2:13 PM EST  Electronically signed by Marisela Saeed, PT on 12/30/2021 at 3:35 PM        If you have any questions or concerns, please don't hesitate to call. Thank you for your referral.    I have reviewed this plan of care and certify a need for medically necessary rehabilitation services.     Physician Signature:__________________________________________________________  Date:  Please sign and return

## 2022-01-14 ENCOUNTER — HOSPITAL ENCOUNTER (OUTPATIENT)
Dept: MRI IMAGING | Age: 57
Discharge: HOME | End: 2022-01-16
Payer: COMMERCIAL

## 2022-01-14 DIAGNOSIS — S46.012D STRAIN OF TENDON OF LEFT ROTATOR CUFF, SUBSEQUENT ENCOUNTER: ICD-10-CM

## 2022-01-14 PROCEDURE — 73221 MRI JOINT UPR EXTREM W/O DYE: CPT

## 2022-02-14 ENCOUNTER — HOSPITAL ENCOUNTER (OUTPATIENT)
Dept: PHYSICAL THERAPY | Age: 57
Setting detail: THERAPIES SERIES
Discharge: HOME | End: 2022-02-14
Payer: COMMERCIAL

## 2022-02-14 PROCEDURE — G0283 ELEC STIM OTHER THAN WOUND: HCPCS

## 2022-02-14 PROCEDURE — 97110 THERAPEUTIC EXERCISES: CPT

## 2022-02-14 PROCEDURE — 97140 MANUAL THERAPY 1/> REGIONS: CPT

## 2022-02-14 ASSESSMENT — PAIN DESCRIPTION - DESCRIPTORS: DESCRIPTORS: THROBBING

## 2022-02-14 ASSESSMENT — PAIN DESCRIPTION - ORIENTATION: ORIENTATION: LEFT

## 2022-02-14 ASSESSMENT — PAIN SCALES - GENERAL: PAINLEVEL_OUTOF10: 4

## 2022-02-14 ASSESSMENT — PAIN DESCRIPTION - LOCATION: LOCATION: SHOULDER

## 2022-02-14 NOTE — PROGRESS NOTES
[facility]   Outpatient Physical Therapy   Treatment Note  [] [address]  [x] [facility]  Date: 2022  Patient: Chris Corbin  : 1965  ACCT #: [de-identified]  Referring Practitioner: dr. Uma Paris   Diagnosis: Strain of muscle and tendon of ther rotator cuff        Visit Information:  PT Visit Information  Onset Date: 10/05/21  PT Insurance Information: [facility]  Total # of Visits Approved: 24  Total # of Visits to Date: 13  Plan of Care/Certification Expiration Date: 22  No Show: 0  Canceled Appointment: 0  Progress Note Counter:     SUBJECTIVE:   Subjective  Subjective: pt reports that he had cortisone injection last week. HEP Compliance:  [x] Good [] Fair [] Poor [] Reports not doing due to:    PAIN   Location:   Pain Location: Shoulder  Pain Rating (0-10 pain scale):  Pain Level: 4  Pain Description:  Pain Descriptors: Throbbing  Action:  [x] Acceptable for treatment  []  Other:    OBJECTIVE:   Exercises  Exercise 2: posture ex x 15  Exercise 5: pulleys 3 min ea flex, abd  Exercise 6: wand ex supine flex x10    Manual: []  NA   Manual therapy  Soft Tissue Mobalization: left shldr musculature    Modalities: []  NA  Modalities  E-stim (parameters): IFC and hP supine to decrease pain        ROM: [] NT   AROM LUE (degrees)  L Shoulder Flexion 0-180: 141  L Shoulder ABduction 0-180: 122       HEP  Continue with current Home Exercise Program.  See Objective section for progression of HEP. Comments:       POST-PAIN    Pain Rating (0-10 pain scale):  4/10  Location and Pain Description same as pre-pain unless otherwise indicated.   Action: [] NA  [] Call Physician  [x] Perform HEP  [] Meds as prescribed     ASSESSMENT:     Conditions Requiring Skilled Therapeutic Intervention  Body structures, Functions, Activity limitations: Decreased ROM,Decreased strength,Increased pain,Decreased posture,Decreased endurance  Assessment: Pt with slight decreased in ROm since last visit. Focused treatment on increasing ROM. Tolerance to treatment:  [x] Good   [] Fair   [] Poor  [] Fatigued   [] Increased pain   Limited by:    Goals:        Long term goals  Time Frame for Long term goals : 12 visits  Long term goal 1: Patient will report </= 1/10 pain in left shoulder with work and functional activities. Long term goal 2: Patient will increase left shoulder ROM to [facility] for improved reaching tolerance. Long term goal 3: Patient will increase strength in left shoulder to 5/5 for improved lifting tolerance. Long term goal 4: UEFI >/= 60/80 to demonstrate functional improvements. Long term goal 5: Patient will be independent with HEP. Progress toward goals: rom  Goals Met:    []  See updated POC   Comments:    PLAN:  [x] Continue POC to pt tolerance  [] Hold PT for ___ days.   See note to physician  [] Discharge PT    Signature:   Electronically signed by Ro Dudley PTA on 2/14/22 at 12:59 PM EST    PT Individual Minutes  Time In: 1000  Time Out: 0602  Minutes: 55  Timed Code Treatment Minutes: 45 Minutes    Modality Time In Time Out Total Minutes Units   Ther ex ([id number]) [id number] 28 2   Manual Therapy ([id number]) [id number] 90 1   Estim unattended   ([id number]) [id number] 13 6

## 2022-02-17 ENCOUNTER — HOSPITAL ENCOUNTER (OUTPATIENT)
Dept: PHYSICAL THERAPY | Age: 57
Setting detail: THERAPIES SERIES
Discharge: HOME | End: 2022-02-17
Payer: COMMERCIAL

## 2022-02-17 PROCEDURE — 97110 THERAPEUTIC EXERCISES: CPT

## 2022-02-17 PROCEDURE — 97140 MANUAL THERAPY 1/> REGIONS: CPT

## 2022-02-17 PROCEDURE — G0283 ELEC STIM OTHER THAN WOUND: HCPCS

## 2022-02-17 ASSESSMENT — PAIN SCALES - GENERAL: PAINLEVEL_OUTOF10: 6

## 2022-02-17 ASSESSMENT — PAIN DESCRIPTION - DESCRIPTORS: DESCRIPTORS: THROBBING

## 2022-02-17 ASSESSMENT — PAIN DESCRIPTION - LOCATION: LOCATION: SHOULDER

## 2022-02-17 ASSESSMENT — PAIN DESCRIPTION - ORIENTATION: ORIENTATION: LEFT

## 2022-02-17 NOTE — PROGRESS NOTES
[facility]   Outpatient Physical Therapy   Treatment Note  [] [address]  [x] [facility]            of [address]  Date: 2022  Patient: Adams Lott  : 1965  ACCT #: [de-identified]  Referring Practitioner: dr. Erick Gambino   Diagnosis: Strain of muscle and tendon of ther rotator cuff  Treatment Diagnosis: impaired left shoulder ROM, impaired left shoulder strength, left shoulder pain     Visit Information:  PT Visit Information  Onset Date: 10/05/21  PT Insurance Information: [facility]  Total # of Visits Approved: 24  Total # of Visits to Date: 15  Plan of Care/Certification Expiration Date: 22  No Show: 0  Canceled Appointment: 0  Progress Note Counter:     SUBJECTIVE:   Subjective  Subjective: Pt reports sore since monday when he started using arm a little more. HEP Compliance:  [x] Good [] Fair [] Poor [] Reports not doing due to:    PAIN   Location:   Pain Location: Shoulder  Pain Rating (0-10 pain scale):  Pain Level: 6  Pain Description:  Pain Descriptors: Throbbing  Action:  [x] Acceptable for treatment  []  Other:    OBJECTIVE:   Exercises  Exercise 2: posture ex x 15  Exercise 4: pendulums x10   Exercise 5: pulleys 3 min ea flex, abd  Exercise 6: wand ex supine flex x10    Manual: []  NA   Manual therapy  Soft Tissue Mobalization: left shldr musculature    Modalities: []  NA  Modalities  E-stim (parameters): IFC and hP 15 min supine to decrease pain      HEP  Continue with current Home Exercise Program.  See Objective section for progression of HEP. Comments:       POST-PAIN    Pain Rating (0-10 pain scale): 8/10  Location and Pain Description same as pre-pain unless otherwise indicated.   Action: [] NA  [] Call Physician  [x] Perform HEP  [] Meds as prescribed     ASSESSMENT:     Conditions Requiring Skilled Therapeutic Intervention  Body structures, Functions, Activity limitations: Decreased ROM,Decreased strength,Increased pain,Decreased posture,Decreased endurance  Assessment: Modified treatment per pts oswaldo. Pt rolled onto the lt shoulder to get off mat with sig increase in pain. Instructed pt to ice as needed at home. Treatment Diagnosis: impaired left shoulder ROM, impaired left shoulder strength, left shoulder pain        Tolerance to treatment:  [x] Good   [] Fair   [] Poor  [] Fatigued   [] Increased pain   Limited by:    Goals:        Long term goals  Time Frame for Long term goals : 12 visits  Long term goal 1: Patient will report </= 1/10 pain in left shoulder with work and functional activities. Long term goal 2: Patient will increase left shoulder ROM to [facility] for improved reaching tolerance. Long term goal 3: Patient will increase strength in left shoulder to 5/5 for improved lifting tolerance. Long term goal 4: UEFI >/= 60/80 to demonstrate functional improvements. Long term goal 5: Patient will be independent with HEP. Progress toward goals:lt shoulder   Goals Met:    []  See updated POC   Comments:    PLAN:  [x] Continue POC to pt tolerance  [] Hold PT for ___ days.   See note to physician  [] Discharge PT    Signature:   Electronically signed by Kim Gonzalez PTA on 2/17/22 at 3:45 PM EST    PT Individual Minutes  Time In: 1400  Time Out: 5041  Minutes: 46  Timed Code Treatment Minutes: 31 Minutes    Modality Time In Time Out Total Minutes Units   Ther ex ([id number]) 9786 1420 20 1   Manual Therapy ([phone]   Estim unattended   [phone]

## 2022-02-21 ENCOUNTER — HOSPITAL ENCOUNTER (OUTPATIENT)
Dept: PHYSICAL THERAPY | Age: 57
Setting detail: THERAPIES SERIES
Discharge: HOME | End: 2022-02-21
Payer: COMMERCIAL

## 2022-02-21 NOTE — PROGRESS NOTES
[address] of [address] [facility] [employer]      Physical Therapy  Cancellation/No-show Note          Patient Name:  Ian Snyder  :  1965   Date:  2022  Referring Practitioner: dr. Luis Andrade   Diagnosis: Strain of muscle and tendon of ther rotator cuff    Visit Information:  PT Visit Information  Onset Date: 10/05/21  PT Insurance Information: [facility]  Total # of Visits Approved: 24  Plan of Care/Certification Expiration Date: 22  No Show: 0  Canceled Appointment: 1  Progress Note Counter:  cx    For today's appointment patient:  [x]  Cancelled  []  Rescheduled appointment  []  No-show   []  Called pt to remind of next appointment     Reason given by patient:  []  Patient ill  []  Conflicting appointment  []  No transportation    []  Conflict with work  []  Weather  []  No reason given   [x]  Other:   Pt cancelled all upcoming appts going to have shoulder surgery.      Comments:       Signature: Electronically signed by Weston Capone PTA on 22 at 10:51 AM EST

## 2022-02-21 NOTE — PROGRESS NOTES
[address] [facility]   [address] [place], [place]  Phone:  [phone]   Fax:  [phone]    [] Certification  [] Recertification []  Plan of Care  [] Progress Note   [x] Discharge        To:  Janette Dempsey  From:  Barby Godfrey, PT  Patient: Tosha Case     : 1965  Diagnosis: Strain of muscle and tendon of ther rotator cuff     Date: 2022  Treatment Diagnosis: impaired left shoulder ROM, impaired left shoulder strength, left shoulder pain    Plan of Care/Certification Expiration Date: 22  Progress Report Period from:21   to 2022        No Show: 0    Canceled Appointment: 1     OBJECTIVE:   Short Term Goals =Long term goals    Long Term Goals - Time Frame for Long term goals : 12 visits  Goals Current/ Discharge status Met   Long term goal 1: Patient will report </= 1/10 pain in left shoulder with work and functional activities. Pain 6/10  [] yes  [x] no   Long term goal 2: Patient will increase left shoulder ROM to [facility] for improved reaching tolerance. Lt shoulder flexion 141                     Extension 45                      Abduction 122                      Int rotation 50                      EXT rotation 40  Taken 21 [] yes  [x] no   Long term goal 3: Patient will increase strength in left shoulder to 5/5 for improved lifting tolerance. nt due to pt having surgery  [] yes  [] no   Long term goal 4: UEFI >/= 60/80 to demonstrate functional improvements. nt due to pt having surgery  [] yes  [] no   Long term goal 5: Patient will be independent with HEP. Progressing HEP as oswaldo [] yes  [] no     Assessment: Patient will be discharged at this time due to having surgery on shoulder. PLAN: D/C from PT        Precautions:             Patient Status:[] Continue/ Initate plan of Care    [x] Discharge PT. Recommend pt continue with HEP.      [] Additional visits requested, Please re-certify for additional visits:        Signature: Electronically signed by Jodi Hector PTA on 2/21/22 at 10:53 AM EST  Electronically signed by Lul Jacobson PT on 2/25/2022 at 3:36 PM        If you have any questions or concerns, please don't hesitate to call. Thank you for your referral.    I have reviewed this plan of care and certify a need for medically necessary rehabilitation services.     Physician Signature:__________________________________________________________  Date:  Please sign and return

## 2022-02-24 ENCOUNTER — APPOINTMENT (OUTPATIENT)
Dept: PHYSICAL THERAPY | Age: 57
End: 2022-02-24
Payer: COMMERCIAL

## 2022-02-28 ENCOUNTER — APPOINTMENT (OUTPATIENT)
Dept: PHYSICAL THERAPY | Age: 57
End: 2022-02-28
Payer: COMMERCIAL

## 2023-03-13 ENCOUNTER — OFFICE VISIT (OUTPATIENT)
Dept: PRIMARY CARE | Facility: CLINIC | Age: 58
End: 2023-03-13
Payer: COMMERCIAL

## 2023-03-13 VITALS
BODY MASS INDEX: 32.5 KG/M2 | HEIGHT: 70 IN | OXYGEN SATURATION: 97 % | DIASTOLIC BLOOD PRESSURE: 72 MMHG | SYSTOLIC BLOOD PRESSURE: 102 MMHG | HEART RATE: 91 BPM | WEIGHT: 227 LBS | TEMPERATURE: 97.4 F

## 2023-03-13 DIAGNOSIS — R09.81 NASAL CONGESTION WITH RHINORRHEA: ICD-10-CM

## 2023-03-13 DIAGNOSIS — J34.89 NASAL CONGESTION WITH RHINORRHEA: ICD-10-CM

## 2023-03-13 DIAGNOSIS — R05.9 COUGH IN ADULT PATIENT: ICD-10-CM

## 2023-03-13 DIAGNOSIS — J00 NASOPHARYNGITIS: Primary | ICD-10-CM

## 2023-03-13 PROCEDURE — 1036F TOBACCO NON-USER: CPT | Performed by: NURSE PRACTITIONER

## 2023-03-13 PROCEDURE — 99213 OFFICE O/P EST LOW 20 MIN: CPT | Performed by: NURSE PRACTITIONER

## 2023-03-13 RX ORDER — SUB-Q INSULIN DEVICE, 40 UNIT
EACH MISCELLANEOUS
COMMUNITY
Start: 2023-03-06 | End: 2023-06-19

## 2023-03-13 RX ORDER — METOPROLOL TARTRATE 25 MG/1
25 TABLET, FILM COATED ORAL 2 TIMES DAILY
COMMUNITY
Start: 2018-06-26 | End: 2023-07-27 | Stop reason: DRUGHIGH

## 2023-03-13 RX ORDER — FENOFIBRATE 160 MG/1
160 TABLET ORAL DAILY
COMMUNITY
End: 2023-11-27 | Stop reason: SDUPTHER

## 2023-03-13 RX ORDER — OMEPRAZOLE 20 MG/1
20 CAPSULE, DELAYED RELEASE ORAL
COMMUNITY

## 2023-03-13 RX ORDER — DAPAGLIFLOZIN 10 MG/1
10 TABLET, FILM COATED ORAL
COMMUNITY
End: 2023-05-10

## 2023-03-13 RX ORDER — BROMPHENIRAMINE MALEATE, PSEUDOEPHEDRINE HYDROCHLORIDE, AND DEXTROMETHORPHAN HYDROBROMIDE 2; 30; 10 MG/5ML; MG/5ML; MG/5ML
5 SYRUP ORAL 4 TIMES DAILY PRN
Qty: 120 ML | Refills: 0 | Status: SHIPPED | OUTPATIENT
Start: 2023-03-13 | End: 2023-03-23

## 2023-03-13 RX ORDER — INSULIN LISPRO 100 [IU]/ML
INJECTION, SOLUTION INTRAVENOUS; SUBCUTANEOUS
COMMUNITY
End: 2023-04-11

## 2023-03-13 RX ORDER — ICOSAPENT ETHYL 1 G/1
2 CAPSULE ORAL 2 TIMES DAILY
COMMUNITY
End: 2023-10-30

## 2023-03-13 RX ORDER — LISINOPRIL 10 MG/1
10 TABLET ORAL DAILY
COMMUNITY
End: 2023-04-27 | Stop reason: ENTERED-IN-ERROR

## 2023-03-13 RX ORDER — AMOXICILLIN 500 MG/1
500 CAPSULE ORAL EVERY 8 HOURS SCHEDULED
Qty: 21 CAPSULE | Refills: 0 | Status: SHIPPED | OUTPATIENT
Start: 2023-03-13 | End: 2023-03-20

## 2023-03-13 RX ORDER — TRAZODONE HYDROCHLORIDE 50 MG/1
50 TABLET ORAL NIGHTLY
COMMUNITY
End: 2024-01-18

## 2023-03-13 RX ORDER — INSULIN GLARGINE 100 [IU]/ML
75 INJECTION, SOLUTION SUBCUTANEOUS EVERY MORNING
COMMUNITY
End: 2023-08-17

## 2023-03-13 RX ORDER — NAPROXEN SODIUM 220 MG/1
81 TABLET, FILM COATED ORAL DAILY
COMMUNITY

## 2023-03-13 RX ORDER — ESCITALOPRAM OXALATE 20 MG/1
20 TABLET ORAL DAILY
COMMUNITY
End: 2023-11-27

## 2023-03-13 RX ORDER — ORAL SEMAGLUTIDE 14 MG/1
14 TABLET ORAL DAILY
COMMUNITY
End: 2023-04-27 | Stop reason: ALTCHOICE

## 2023-03-13 RX ORDER — METHOCARBAMOL 500 MG/1
500 TABLET, FILM COATED ORAL EVERY 8 HOURS
COMMUNITY
Start: 2022-10-17 | End: 2024-01-10 | Stop reason: WASHOUT

## 2023-03-13 RX ORDER — CYCLOBENZAPRINE HCL 10 MG
10 TABLET ORAL NIGHTLY
COMMUNITY
Start: 2022-10-15 | End: 2023-07-07 | Stop reason: SDUPTHER

## 2023-03-13 RX ORDER — NITROGLYCERIN 0.4 MG/1
0.4 TABLET SUBLINGUAL
COMMUNITY
Start: 2020-06-30

## 2023-03-13 RX ORDER — ATORVASTATIN CALCIUM 80 MG/1
80 TABLET, FILM COATED ORAL NIGHTLY
COMMUNITY
End: 2023-11-27 | Stop reason: SDUPTHER

## 2023-03-13 RX ORDER — DICLOFENAC SODIUM 50 MG/1
50 TABLET, DELAYED RELEASE ORAL EVERY 8 HOURS PRN
COMMUNITY
Start: 2022-10-17

## 2023-03-13 RX ORDER — PEN NEEDLE, DIABETIC 32GX 5/32"
NEEDLE, DISPOSABLE MISCELLANEOUS
COMMUNITY
Start: 2022-09-14 | End: 2023-08-18

## 2023-03-13 NOTE — PROGRESS NOTES
Subjective   Patient ID: Paulo Roman is a 58 y.o. male who is with a chief complaint of symptoms of respiratory tract infection.     HPI  Patient is a 58 y.o. male who CONSULTED AT OFFICE CLINIC today. Patient is with complaints of nasal congestion, nasal discharge, headache / sinus pain, throat irritation, cough, and fatigue. He denies having muscle ache, loss of sense of taste, loss of sense of smell, diarrhea, chills nor fever. Patient states that present condition started about 4 days ago. Patient denies history of recent travel, exposure to person/people who tested positive for COVID 19, nor exposure to person/people with flu like symptoms. he denies shortness of breath, chest pain, palpitations, nor edema. he stated that he  tried OTC medications which afforded only slight relief of symptoms. he denies nausea, vomiting, abdominal pain, nor any other symptoms.    Patient states he had his COVID vaccine.  Patient states he had the flu shot for this season.    Patient states that he opted to have no COVID test requested at this time. he states that he will do COVID test by [facility] depending on symptoms progression.  ----------------------------------------  Cough (Dry--coughing fits that lead to gagging. Worse when laying down. Abdomen hurts from coughing so much. ), Nasal Congestion, Headache (On and off- from coughing. ), Diarrhea (Started Thursday night---RESOLVED ), and Fatigue (Unable to sleep due to cough)  note by - Shama Ivy MA   ------------------------------------------------------------------------------      Review of Systems  General: no weight loss, generally healthy, (+) fatigue  Head: (+) headaches / sinus pain, no vertigo, no injury  Eyes: no diplopia, no tearing, no pain,   Ears: no change in hearing, no tinnitus, no bleeding, no vertigo  Mouth:  no dental difficulties, no gingival bleeding, (+) throat irritation, no loss of sense of taste  Nose: (+) congestion, (+) discharge, no  bleeding, no obstruction, no loss of sense of smell  Neck: no stiffness, no pain, no tenderness, no masses, no bruit  Pulmonary: no dyspnea, no wheezing, no hemoptysis, (+) cough  Cardiovascular: no chest pain, no palpitations, no syncope, no orthopnea  Gastrointestinal: no change in appetite, no dysphagia, no abdominal pains, no diarrhea, no emesis, no melena  Genito Urinary: no dysuria, no urinary urgency, no nocturia, no incontinence, no change in nature of urine  Musculoskeletal: no muscle ache, no joint pain, no limitation of range of motion, no paresthesia, no numbness  Constitutional: no fever, no chills, no night sweats    Objective   Physical Exam  General: ambulatory, in no acute distress  Head: normocephalic, no lesions, no sinus tenderness  Eyes: pink palpebral conjunctiva, anicteric sclerae, PERRLA, EOM's full  Ears: clear external auditory canals, no ear discharge, no bleeding from the ears, tympanic membrane intact  Nose: (+) congested nasal mucosa, (+) yellow mucoid nasal discharge, no bleeding, no obstruction  Throat: (+) erythema, and (+) exudate on posterior pharyngeal wall, no lesion  Neck: supple, no masses, no bruits, no CLADP  Chest: symmetrical chest expansion, no lagging, no retractions, clear breath sounds, no rales, no wheezes  Heart: regular rate, normal rhythm, no heaves, no thrills, no murmurs  Abdomen: soft, non-tender, normoactive bowel sounds, no mass palpated  Musculoskeletal: no limitation of range of motion, no paralysis, no deformity  Extremities: full and equal peripheral pulses, no edema,      Assessment/Plan   Problem List Items Addressed This Visit    None  Visit Diagnoses       Nasopharyngitis    -  Primary    Relevant Medications    amoxicillin (Amoxil) 500 mg capsule    brompheniramine-pseudoeph-DM 2-30-10 mg/5 mL syrup    Cough in adult patient        Relevant Medications    amoxicillin (Amoxil) 500 mg capsule    brompheniramine-pseudoeph-DM 2-30-10 mg/5 mL syrup    Nasal  congestion with rhinorrhea        Relevant Medications    amoxicillin (Amoxil) 500 mg capsule    brompheniramine-pseudoeph-DM 2-30-10 mg/5 mL syrup             DISCHARGE SUMMARY:   Patient was seen and examined. Diagnosis, treatment, treatment options, and possible complications of today's illness discussed and explained to patient. Patient to take medication/s associated with this visit. Patient may also take OTC analgesic/antipyretic if needed for pain/fever. Advised to increase oral fluid intake. Advised steam inhalation if needed to relieve congestion. Advised warm saline gargle if needed to relieve throat discomfort. Advised to come back if with worsening or persistent symptoms. Patient verbalized understanding of plan of care.    Patient to come back in 7 - 10 days if needed for worsening symptoms.

## 2023-03-13 NOTE — PROGRESS NOTES
Subjective   Patient ID: Paulo Roman is a 58 y.o. male who presents for No chief complaint on file..    Onset: Friday 03.10.2023    HPI     Review of Systems    Objective   There were no vitals taken for this visit.    Physical Exam    Assessment/Plan

## 2023-03-13 NOTE — PATIENT INSTRUCTIONS
DISCHARGE SUMMARY:   Patient was seen and examined. Diagnosis, treatment, treatment options, and possible complications of today's illness discussed and explained to patient. Patient to take medication/s associated with this visit. Patient may also take OTC analgesic/antipyretic if needed for pain/fever. Advised to increase oral fluid intake. Advised steam inhalation if needed to relieve congestion. Advised warm saline gargle if needed to relieve throat discomfort. Advised to come back if with worsening or persistent symptoms. Patient verbalized understanding of plan of care.    Patient to come back in 7 - 10 days if needed for worsening symptoms.

## 2023-03-22 ENCOUNTER — TELEPHONE (OUTPATIENT)
Dept: PRIMARY CARE | Facility: CLINIC | Age: 58
End: 2023-03-22
Payer: COMMERCIAL

## 2023-03-27 ENCOUNTER — TELEPHONE (OUTPATIENT)
Dept: PRIMARY CARE | Facility: CLINIC | Age: 58
End: 2023-03-27
Payer: COMMERCIAL

## 2023-03-27 NOTE — TELEPHONE ENCOUNTER
Patient called stating he saw bora alegre on 3/13 and is requesting another prescription for amoxicillin 500mg. Patient stated he is still coughing.  Send to [facility]

## 2023-03-29 PROBLEM — F33.1 MODERATE EPISODE OF RECURRENT MAJOR DEPRESSIVE DISORDER (MULTI): Status: ACTIVE | Noted: 2023-03-29

## 2023-03-29 PROBLEM — M25.519 SHOULDER PAIN: Status: ACTIVE | Noted: 2023-03-29

## 2023-03-29 PROBLEM — M25.552 LEFT HIP PAIN: Status: ACTIVE | Noted: 2023-03-29

## 2023-03-29 PROBLEM — S46.002A ROTATOR CUFF INJURY, LEFT, INITIAL ENCOUNTER: Status: ACTIVE | Noted: 2023-03-29

## 2023-03-29 PROBLEM — F51.04 CHRONIC INSOMNIA: Status: ACTIVE | Noted: 2023-03-29

## 2023-03-29 PROBLEM — G47.26 SHIFT WORK SLEEP DISORDER: Status: ACTIVE | Noted: 2023-03-29

## 2023-03-29 PROBLEM — M72.0 DUPUYTREN'S CONTRACTURE OF BOTH HANDS: Status: ACTIVE | Noted: 2023-03-29

## 2023-03-29 PROBLEM — G89.29 CHRONIC NECK PAIN: Status: ACTIVE | Noted: 2023-03-29

## 2023-03-29 PROBLEM — M13.0 POLYARTHRITIS: Status: ACTIVE | Noted: 2023-03-29

## 2023-03-29 PROBLEM — R06.02 SHORTNESS OF BREATH ON EXERTION: Status: ACTIVE | Noted: 2023-03-29

## 2023-03-29 PROBLEM — M75.02 ADHESIVE CAPSULITIS OF LEFT SHOULDER: Status: ACTIVE | Noted: 2023-03-29

## 2023-03-29 PROBLEM — L02.431 CARBUNCLE OF RIGHT AXILLA: Status: ACTIVE | Noted: 2023-03-29

## 2023-03-29 PROBLEM — K61.0 PERIANAL ABSCESS: Status: ACTIVE | Noted: 2023-03-29

## 2023-03-29 PROBLEM — S39.012A STRAIN OF LUMBAR REGION: Status: ACTIVE | Noted: 2023-03-29

## 2023-03-29 PROBLEM — E78.2 MIXED HYPERLIPIDEMIA: Status: ACTIVE | Noted: 2023-03-29

## 2023-03-29 PROBLEM — G89.29 CHRONIC BILATERAL LOW BACK PAIN WITH LEFT-SIDED SCIATICA: Status: ACTIVE | Noted: 2023-03-29

## 2023-03-29 PROBLEM — I10 ESSENTIAL HYPERTENSION: Status: ACTIVE | Noted: 2023-03-29

## 2023-03-29 PROBLEM — U07.1 DISEASE DUE TO SEVERE ACUTE RESPIRATORY SYNDROME CORONAVIRUS 2 (SARS-COV-2): Status: ACTIVE | Noted: 2023-03-29

## 2023-03-29 PROBLEM — I25.10 ATHEROSCLEROSIS OF CORONARY ARTERY: Status: ACTIVE | Noted: 2023-03-29

## 2023-03-29 PROBLEM — R09.81 NASAL CONGESTION WITH RHINORRHEA: Status: ACTIVE | Noted: 2023-03-29

## 2023-03-29 PROBLEM — M54.9 BACK PAIN: Status: ACTIVE | Noted: 2023-03-29

## 2023-03-29 PROBLEM — S30.0XXA CONTUSION OF LOWER BACK: Status: ACTIVE | Noted: 2023-03-29

## 2023-03-29 PROBLEM — M16.12 PRIMARY LOCALIZED OSTEOARTHRITIS OF LEFT HIP: Status: ACTIVE | Noted: 2023-03-29

## 2023-03-29 PROBLEM — F41.1 GENERALIZED ANXIETY DISORDER: Status: ACTIVE | Noted: 2023-03-29

## 2023-03-29 PROBLEM — L03.111 CELLULITIS OF RIGHT AXILLA: Status: ACTIVE | Noted: 2023-03-29

## 2023-03-29 PROBLEM — M19.90 OSTEOARTHRITIS: Status: ACTIVE | Noted: 2023-03-29

## 2023-03-29 PROBLEM — J00 NASOPHARYNGITIS: Status: ACTIVE | Noted: 2023-03-29

## 2023-03-29 PROBLEM — L02.93 CARBUNCLE: Status: ACTIVE | Noted: 2023-03-29

## 2023-03-29 PROBLEM — J34.89 NASAL CONGESTION WITH RHINORRHEA: Status: ACTIVE | Noted: 2023-03-29

## 2023-03-29 PROBLEM — T14.8XXA MUSCLE STRAIN: Status: ACTIVE | Noted: 2023-03-29

## 2023-03-29 PROBLEM — M54.2 CHRONIC NECK PAIN: Status: ACTIVE | Noted: 2023-03-29

## 2023-03-29 PROBLEM — A08.4 VIRAL GASTROENTERITIS: Status: ACTIVE | Noted: 2023-03-29

## 2023-03-29 PROBLEM — M54.42 CHRONIC BILATERAL LOW BACK PAIN WITH LEFT-SIDED SCIATICA: Status: ACTIVE | Noted: 2023-03-29

## 2023-03-29 PROBLEM — R05.3 PERSISTENT COUGH: Status: ACTIVE | Noted: 2023-03-29

## 2023-03-29 PROBLEM — I73.9 INTERMITTENT CLAUDICATION (CMS-HCC): Status: ACTIVE | Noted: 2023-03-29

## 2023-03-29 NOTE — TELEPHONE ENCOUNTER
PATIENT RETURNED CALL .  I RESCHEDULED HIS 4/4/2023 VISIT TO TOMORROW TO FOLLOW UP FROM WALK IN CLINIC VISIT   Localized Dermabrasion Text: The patient was draped in routine manner.  Localized dermabrasion using 3 x 17 mm wire brush was performed in routine manner to papillary dermis. This spot dermabrasion is being performed to complete skin cancer reconstruction. It also will eliminate the other sun damaged precancerous cells that are known to be part of the regional effect of a lifetime's worth of sun exposure. This localized dermabrasion is therapeutic and should not be considered cosmetic in any regard. Localized Dermabrasion With Wire Brush Text: The patient was draped in routine manner.  Localized dermabrasion using 3 x 17 mm wire brush was performed in routine manner to papillary dermis. This spot dermabrasion is being performed to complete skin cancer reconstruction. It also will eliminate the other sun damaged precancerous cells that are known to be part of the regional effect of a lifetime's worth of sun exposure. This localized dermabrasion is therapeutic and should not be considered cosmetic in any regard.

## 2023-03-29 NOTE — TELEPHONE ENCOUNTER
Attempted to contact patient to set up an in office appointment but the phone does not currently have a vm set up if patient calls back he needs to be seen in office for further treatment as explained in prior messages

## 2023-03-30 ENCOUNTER — OFFICE VISIT (OUTPATIENT)
Dept: PRIMARY CARE | Facility: CLINIC | Age: 58
End: 2023-03-30
Payer: COMMERCIAL

## 2023-03-30 VITALS
OXYGEN SATURATION: 97 % | BODY MASS INDEX: 33.73 KG/M2 | TEMPERATURE: 97.5 F | DIASTOLIC BLOOD PRESSURE: 78 MMHG | RESPIRATION RATE: 17 BRPM | SYSTOLIC BLOOD PRESSURE: 130 MMHG | WEIGHT: 235.6 LBS | HEART RATE: 85 BPM | HEIGHT: 70 IN

## 2023-03-30 DIAGNOSIS — J01.01 ACUTE RECURRENT MAXILLARY SINUSITIS: Primary | ICD-10-CM

## 2023-03-30 PROCEDURE — 99213 OFFICE O/P EST LOW 20 MIN: CPT | Performed by: FAMILY MEDICINE

## 2023-03-30 PROCEDURE — 1036F TOBACCO NON-USER: CPT | Performed by: FAMILY MEDICINE

## 2023-03-30 PROCEDURE — 3075F SYST BP GE 130 - 139MM HG: CPT | Performed by: FAMILY MEDICINE

## 2023-03-30 PROCEDURE — 3078F DIAST BP <80 MM HG: CPT | Performed by: FAMILY MEDICINE

## 2023-03-30 RX ORDER — PROMETHAZINE HYDROCHLORIDE AND DEXTROMETHORPHAN HYDROBROMIDE 6.25; 15 MG/5ML; MG/5ML
5 SYRUP ORAL EVERY 4 HOURS PRN
Qty: 200 ML | Refills: 0 | Status: SHIPPED | OUTPATIENT
Start: 2023-03-30 | End: 2023-04-06

## 2023-03-30 RX ORDER — LEVOFLOXACIN 500 MG/1
500 TABLET, FILM COATED ORAL DAILY
Qty: 10 TABLET | Refills: 0 | Status: SHIPPED | OUTPATIENT
Start: 2023-03-30 | End: 2023-04-09

## 2023-03-30 ASSESSMENT — ENCOUNTER SYMPTOMS
WHEEZING: 0
DIARRHEA: 0
COUGH: 1
RHINORRHEA: 1
DIZZINESS: 0
HEMATURIA: 0
CHILLS: 0
FREQUENCY: 0
HEADACHES: 1
NUMBNESS: 0
SORE THROAT: 0
ABDOMINAL PAIN: 0
CONSTIPATION: 0
DYSURIA: 0
FEVER: 0
NECK PAIN: 0
SHORTNESS OF BREATH: 0
PALPITATIONS: 0
VOMITING: 0
NAUSEA: 0
SWOLLEN GLANDS: 0
SINUS PAIN: 1
TREMORS: 0

## 2023-03-30 ASSESSMENT — PATIENT HEALTH QUESTIONNAIRE - PHQ9
SUM OF ALL RESPONSES TO PHQ9 QUESTIONS 1 AND 2: 0
2. FEELING DOWN, DEPRESSED OR HOPELESS: NOT AT ALL
1. LITTLE INTEREST OR PLEASURE IN DOING THINGS: NOT AT ALL

## 2023-03-30 NOTE — ASSESSMENT & PLAN NOTE
Take Tylenol for pain and fever.  Recommend liberal oral fluid intake.  Call if symptoms fail to improve as expected.    Follow-up if persistent or worsening symptoms otherwise when necessary.

## 2023-04-04 ENCOUNTER — APPOINTMENT (OUTPATIENT)
Dept: PRIMARY CARE | Facility: CLINIC | Age: 58
End: 2023-04-04
Payer: COMMERCIAL

## 2023-04-11 DIAGNOSIS — E11.9 TYPE 2 DIABETES MELLITUS WITHOUT COMPLICATION, WITH LONG-TERM CURRENT USE OF INSULIN (MULTI): ICD-10-CM

## 2023-04-11 DIAGNOSIS — Z79.4 TYPE 2 DIABETES MELLITUS WITHOUT COMPLICATION, WITH LONG-TERM CURRENT USE OF INSULIN (MULTI): ICD-10-CM

## 2023-04-11 RX ORDER — INSULIN LISPRO 100 [IU]/ML
INJECTION, SOLUTION INTRAVENOUS; SUBCUTANEOUS
Qty: 30 ML | Refills: 3 | Status: SHIPPED | OUTPATIENT
Start: 2023-04-11 | End: 2023-08-17

## 2023-04-11 NOTE — TELEPHONE ENCOUNTER
Rx Refill Request     Name: Paulo Roman  :  1965   Medication Name:  HUMALOG  Specific Pharmacy location:  [facility]  Date of last appointment:  3/30/2023   Date of next appointment:  2023   Best number to reach patient:  [phone]       Lab Results   Component Value Date    HGBA1C 9.1 (A) 2023    HGBA1C 8.9 (A) 2022    HGBA1C 9.7 (A) 2022     Lab Results   Component Value Date    CREATININE 1.09 2023

## 2023-04-25 LAB
ALANINE AMINOTRANSFERASE (SGPT) (U/L) IN SER/PLAS: 24 U/L (ref 10–52)
ALBUMIN (G/DL) IN SER/PLAS: 4.5 G/DL (ref 3.4–5)
ALKALINE PHOSPHATASE (U/L) IN SER/PLAS: 45 U/L (ref 33–120)
ANION GAP IN SER/PLAS: 15 MMOL/L (ref 10–20)
ASPARTATE AMINOTRANSFERASE (SGOT) (U/L) IN SER/PLAS: 18 U/L (ref 9–39)
BASOPHILS (10*3/UL) IN BLOOD BY AUTOMATED COUNT: 0.04 X10E9/L (ref 0–0.1)
BASOPHILS/100 LEUKOCYTES IN BLOOD BY AUTOMATED COUNT: 0.8 % (ref 0–2)
BILIRUBIN TOTAL (MG/DL) IN SER/PLAS: 0.6 MG/DL (ref 0–1.2)
CALCIUM (MG/DL) IN SER/PLAS: 9.3 MG/DL (ref 8.6–10.3)
CARBON DIOXIDE, TOTAL (MMOL/L) IN SER/PLAS: 27 MMOL/L (ref 21–32)
CHLORIDE (MMOL/L) IN SER/PLAS: 103 MMOL/L (ref 98–107)
CHOLESTEROL (MG/DL) IN SER/PLAS: 168 MG/DL (ref 0–199)
CHOLESTEROL IN HDL (MG/DL) IN SER/PLAS: 39.3 MG/DL
CHOLESTEROL/HDL RATIO: 4.3
CREATININE (MG/DL) IN SER/PLAS: 1.02 MG/DL (ref 0.5–1.3)
EOSINOPHILS (10*3/UL) IN BLOOD BY AUTOMATED COUNT: 0.1 X10E9/L (ref 0–0.7)
EOSINOPHILS/100 LEUKOCYTES IN BLOOD BY AUTOMATED COUNT: 2 % (ref 0–6)
ERYTHROCYTE DISTRIBUTION WIDTH (RATIO) BY AUTOMATED COUNT: 13.2 % (ref 11.5–14.5)
ERYTHROCYTE MEAN CORPUSCULAR HEMOGLOBIN CONCENTRATION (G/DL) BY AUTOMATED: 33.7 G/DL (ref 32–36)
ERYTHROCYTE MEAN CORPUSCULAR VOLUME (FL) BY AUTOMATED COUNT: 88 FL (ref 80–100)
ERYTHROCYTES (10*6/UL) IN BLOOD BY AUTOMATED COUNT: 5.53 X10E12/L (ref 4.5–5.9)
ESTIMATED AVERAGE GLUCOSE FOR HBA1C: 226 MG/DL
GFR MALE: 86 ML/MIN/1.73M2
GLUCOSE (MG/DL) IN SER/PLAS: 122 MG/DL (ref 74–99)
HEMATOCRIT (%) IN BLOOD BY AUTOMATED COUNT: 48.4 % (ref 41–52)
HEMOGLOBIN (G/DL) IN BLOOD: 16.3 G/DL (ref 13.5–17.5)
HEMOGLOBIN A1C/HEMOGLOBIN TOTAL IN BLOOD: 9.5 %
IMMATURE GRANULOCYTES/100 LEUKOCYTES IN BLOOD BY AUTOMATED COUNT: 0.4 % (ref 0–0.9)
LDL: 66 MG/DL (ref 0–99)
LEUKOCYTES (10*3/UL) IN BLOOD BY AUTOMATED COUNT: 5 X10E9/L (ref 4.4–11.3)
LYMPHOCYTES (10*3/UL) IN BLOOD BY AUTOMATED COUNT: 1.81 X10E9/L (ref 1.2–4.8)
LYMPHOCYTES/100 LEUKOCYTES IN BLOOD BY AUTOMATED COUNT: 36.4 % (ref 13–44)
MONOCYTES (10*3/UL) IN BLOOD BY AUTOMATED COUNT: 0.34 X10E9/L (ref 0.1–1)
MONOCYTES/100 LEUKOCYTES IN BLOOD BY AUTOMATED COUNT: 6.8 % (ref 2–10)
NEUTROPHILS (10*3/UL) IN BLOOD BY AUTOMATED COUNT: 2.66 X10E9/L (ref 1.2–7.7)
NEUTROPHILS/100 LEUKOCYTES IN BLOOD BY AUTOMATED COUNT: 53.6 % (ref 40–80)
NON HDL CHOLESTEROL: 129 MG/DL
PLATELETS (10*3/UL) IN BLOOD AUTOMATED COUNT: 186 X10E9/L (ref 150–450)
POTASSIUM (MMOL/L) IN SER/PLAS: 3.7 MMOL/L (ref 3.5–5.3)
PROTEIN TOTAL: 7 G/DL (ref 6.4–8.2)
SODIUM (MMOL/L) IN SER/PLAS: 141 MMOL/L (ref 136–145)
TRIGLYCERIDE (MG/DL) IN SER/PLAS: 316 MG/DL (ref 0–149)
UREA NITROGEN (MG/DL) IN SER/PLAS: 17 MG/DL (ref 6–23)
VLDL: 63 MG/DL (ref 0–40)

## 2023-04-27 ENCOUNTER — OFFICE VISIT (OUTPATIENT)
Dept: PRIMARY CARE | Facility: CLINIC | Age: 58
End: 2023-04-27
Payer: COMMERCIAL

## 2023-04-27 VITALS
BODY MASS INDEX: 33.36 KG/M2 | TEMPERATURE: 97.8 F | OXYGEN SATURATION: 95 % | WEIGHT: 233 LBS | HEIGHT: 70 IN | SYSTOLIC BLOOD PRESSURE: 128 MMHG | HEART RATE: 81 BPM | DIASTOLIC BLOOD PRESSURE: 78 MMHG | RESPIRATION RATE: 16 BRPM

## 2023-04-27 DIAGNOSIS — Z79.4 TYPE 2 DIABETES MELLITUS WITHOUT COMPLICATION, WITH LONG-TERM CURRENT USE OF INSULIN (MULTI): ICD-10-CM

## 2023-04-27 DIAGNOSIS — Z00.00 HEALTHCARE MAINTENANCE: Primary | ICD-10-CM

## 2023-04-27 DIAGNOSIS — F33.1 MODERATE EPISODE OF RECURRENT MAJOR DEPRESSIVE DISORDER (MULTI): ICD-10-CM

## 2023-04-27 DIAGNOSIS — I73.9 INTERMITTENT CLAUDICATION (CMS-HCC): ICD-10-CM

## 2023-04-27 DIAGNOSIS — Z12.5 ENCOUNTER FOR SCREENING FOR MALIGNANT NEOPLASM OF PROSTATE: ICD-10-CM

## 2023-04-27 DIAGNOSIS — E11.9 TYPE 2 DIABETES MELLITUS WITHOUT COMPLICATION, WITH LONG-TERM CURRENT USE OF INSULIN (MULTI): ICD-10-CM

## 2023-04-27 DIAGNOSIS — I10 ESSENTIAL HYPERTENSION: ICD-10-CM

## 2023-04-27 DIAGNOSIS — F41.1 GENERALIZED ANXIETY DISORDER: ICD-10-CM

## 2023-04-27 DIAGNOSIS — N63.11 MASS OF UPPER OUTER QUADRANT OF RIGHT BREAST: ICD-10-CM

## 2023-04-27 DIAGNOSIS — J30.9 CHRONIC ALLERGIC RHINITIS: ICD-10-CM

## 2023-04-27 DIAGNOSIS — E78.2 MIXED HYPERLIPIDEMIA: ICD-10-CM

## 2023-04-27 PROCEDURE — 4010F ACE/ARB THERAPY RXD/TAKEN: CPT | Performed by: FAMILY MEDICINE

## 2023-04-27 PROCEDURE — 3046F HEMOGLOBIN A1C LEVEL >9.0%: CPT | Performed by: FAMILY MEDICINE

## 2023-04-27 PROCEDURE — 3078F DIAST BP <80 MM HG: CPT | Performed by: FAMILY MEDICINE

## 2023-04-27 PROCEDURE — 3074F SYST BP LT 130 MM HG: CPT | Performed by: FAMILY MEDICINE

## 2023-04-27 PROCEDURE — 99396 PREV VISIT EST AGE 40-64: CPT | Performed by: FAMILY MEDICINE

## 2023-04-27 PROCEDURE — 1036F TOBACCO NON-USER: CPT | Performed by: FAMILY MEDICINE

## 2023-04-27 RX ORDER — BLOOD-GLUCOSE,RECEIVER,CONT
EACH MISCELLANEOUS
Qty: 1 EACH | Refills: 0 | Status: SHIPPED | OUTPATIENT
Start: 2023-04-27

## 2023-04-27 RX ORDER — BLOOD-GLUCOSE TRANSMITTER
EACH MISCELLANEOUS
Qty: 1 EACH | Refills: 0 | Status: SHIPPED | OUTPATIENT
Start: 2023-04-27 | End: 2023-07-27 | Stop reason: SDUPTHER

## 2023-04-27 RX ORDER — FLUTICASONE PROPIONATE 50 MCG
2 SPRAY, SUSPENSION (ML) NASAL DAILY
Qty: 16 G | Refills: 3 | Status: SHIPPED | OUTPATIENT
Start: 2023-04-27

## 2023-04-27 RX ORDER — BLOOD-GLUCOSE SENSOR
EACH MISCELLANEOUS
Qty: 3 EACH | Refills: 3 | Status: SHIPPED | OUTPATIENT
Start: 2023-04-27 | End: 2023-08-22

## 2023-04-27 RX ORDER — TIRZEPATIDE 10 MG/.5ML
10 INJECTION, SOLUTION SUBCUTANEOUS
Qty: 2 ML | Refills: 2 | Status: SHIPPED | OUTPATIENT
Start: 2023-04-27 | End: 2023-05-30 | Stop reason: SINTOL

## 2023-04-27 RX ORDER — LISINOPRIL 5 MG/1
5 TABLET ORAL DAILY
COMMUNITY
Start: 2023-04-21 | End: 2023-07-27 | Stop reason: DRUGHIGH

## 2023-04-27 ASSESSMENT — ENCOUNTER SYMPTOMS
COMPULSIONS: 0
NERVOUS/ANXIOUS: 0
DEPRESSED MOOD: 0
SHORTNESS OF BREATH: 0
PANIC: 0
PALPITATIONS: 0
CONFUSION: 0
DECREASED CONCENTRATION: 0
FEELING OF CHOKING: 0
DIZZINESS: 0
RESTLESSNESS: 0
MUSCLE TENSION: 0
IRRITABILITY: 1
HYPERVENTILATION: 0
NAUSEA: 0
THOUGHT CONTENT - OBSESSIONS: 0

## 2023-04-27 ASSESSMENT — PATIENT HEALTH QUESTIONNAIRE - PHQ9
2. FEELING DOWN, DEPRESSED OR HOPELESS: NOT AT ALL
1. LITTLE INTEREST OR PLEASURE IN DOING THINGS: NOT AT ALL
SUM OF ALL RESPONSES TO PHQ9 QUESTIONS 1 AND 2: 0

## 2023-04-27 NOTE — ASSESSMENT & PLAN NOTE
Diabetes Mellitus type II, under poor control.  1. Rx changes: Increase Basaglar to 75 units daily, Discontinue Rybelsus and start Mounjaro 10 mg weekly.  2. Education: Reviewed ‘ABCs’ of diabetes management (respective goals in parentheses):  A1C (<7), blood pressure (<130/80), and cholesterol (LDL <100).  3. Compliance at present is estimated to be poor. Efforts to improve compliance (if necessary) will be directed at dietary modifications: and increased exercise.  4. Follow up: 3 months

## 2023-04-27 NOTE — PROGRESS NOTES
Chief Complaint   Patient presents with    Annual Exam    Follow-up     Diabetes, Hypertension, Hyperlipidemia, Anxiety, Depression and labs     He presents for annual physical exam and follow up on Diabetes Mellitus, hypertension, hyperlipidemia . Current  diabetes related symptoms include: none. Patient denies foot ulcerations, hypoglycemia , nausea, paresthesia of the feet, polydipsia, polyuria, visual disturbances, vomiting, and weight loss.  Patient denies chest pain, claudication, dyspnea, exertional chest pressure/discomfort, irregular heart beat, lower extremity edema, orthopnea, palpitations, paroxysmal nocturnal dyspnea, and syncope. Evaluation to date has included: fasting blood sugar, fasting lipid panel, hemoglobin A1C, and microalbuminuria.  Home sugars: BGs are running  consistent with Hgb A1C.   Patient denies chest pain, claudication, dyspnea, exertional chest pressure/discomfort, irregular heart beat, lower extremity edema, orthopnea, palpitations, paroxysmal nocturnal dyspnea, and syncope.     Anxiety  Presents for follow-up visit. Symptoms include irritability. Patient reports no chest pain, compulsions, confusion, decreased concentration, depressed mood, dizziness, dry mouth, excessive worry, feeling of choking, hyperventilation, muscle tension, nausea, nervous/anxious behavior, obsessions, palpitations, panic, restlessness, shortness of breath or suicidal ideas. Symptoms occur most days. The severity of symptoms is moderate. The quality of sleep is good. Nighttime awakenings: several.       He presents for evaluation of a right breast mass. Change was noted 3 weeks ago, and has been unchanged since first identified. The mass is painful. Patient denies nipple discharge.     He also complains of nasal congestion, nasal discharge, and postnasal drainage  no epistaxis.      Patient Active Problem List   Diagnosis    Atherosclerosis of coronary artery    Carbuncle    Carbuncle of right axilla     Cellulitis of right axilla    Chronic insomnia    Chronic bilateral low back pain with left-sided sciatica    Chronic neck pain    Contusion of lower back    Disease due to severe acute respiratory syndrome coronavirus 2 (SARS-CoV-2)    Adhesive capsulitis of left shoulder    Dupuytren's contracture of both hands    Essential hypertension    Generalized anxiety disorder    Intermittent claudication (CMS/HCC)    Back pain    Left hip pain    Mixed hyperlipidemia    Moderate episode of recurrent major depressive disorder (CMS/HCC)    Muscle strain    Nasal congestion with rhinorrhea    Nasopharyngitis    Perianal abscess    Persistent cough    Osteoarthritis    Polyarthritis    Primary localized osteoarthritis of left hip    Rotator cuff injury, left, initial encounter    Shift work sleep disorder    Shortness of breath on exertion    Shoulder pain    Strain of lumbar region    Viral gastroenteritis    Acute recurrent maxillary sinusitis    Healthcare maintenance    Type 2 diabetes mellitus without complication, with long-term current use of insulin (CMS/[facility])       has a current medication list which includes the following prescription(s): aspirin, atorvastatin, basaglar kwikpen u-100 insulin, bd patience 2nd gen pen needle, cyclobenzaprine, diclofenac, escitalopram, farxiga, fenofibrate, icosapent ethyl, humalog u-100 insulin, lisinopril, methocarbamol, metoprolol tartrate, nitroglycerin, omeprazole, trazodone, v-go 40, dexcom g6 sensor, dexcom g6 , dexcom g6 transmitter, fluticasone, and mounjaro.    Past Medical History:   Diagnosis Date    Other specified health status     Non-smoker       Past Surgical History:   Procedure Laterality Date    OTHER SURGICAL HISTORY  05/20/2019    Rotator cuff repair    OTHER SURGICAL HISTORY  05/20/2019    Knee surgery    OTHER SURGICAL HISTORY  11/17/2019    Colonoscopy    OTHER SURGICAL HISTORY  09/26/2021    Cardiac catheterization with stent placement    OTHER SURGICAL  HISTORY  10/19/2022    Shoulder surgery       family history includes Diabetes in his daughter, father, mother, and another family member; Hypertension in his father and mother.    Social History     Socioeconomic History    Marital status: Single     Spouse name: Not on file    Number of children: Not on file    Years of education: Not on file    Highest education level: Not on file   Occupational History    Not on file   Tobacco Use    Smoking status: Never     Passive exposure: Never    Smokeless tobacco: Never   Vaping Use    Vaping status: Never Used     Passive vaping exposure: Yes   Substance and Sexual Activity    Alcohol use: Never    Drug use: Never    Sexual activity: Not on file   Other Topics Concern    Not on file   Social History Narrative    Not on file     Social Determinants of Health     Financial Resource Strain: Not on file   Food Insecurity: Not on file   Transportation Needs: Not on file   Physical Activity: Not on file   Stress: Not on file   Social Connections: Not on file   Intimate Partner Violence: Not on file   Housing Stability: Not on file       No Known Allergies    Review of Systems -   General ROS: negative for - fatigue, fever, malaise, night sweats or weight loss  Eyes ROS no blurred vision, no double vision, no eye discharge and no eye redness.  Respiratory ROS: negative for - cough, hemoptysis, pleuritic pain, shortness of breath or wheezing  Cardiovascular ROS: no chest pain or dyspnea on exertion, palpitations, PND or orthopnea.  No syncope.  Gastrointestinal ROS: no abdominal pain, change in bowel habits, or black or bloody stools  Genito-Urinary ROS: no dysuria, trouble voiding, or hematuria  Neurological ROS: negative for - dizziness, gait disturbance, headaches, impaired coordination/balance, numbness/tingling, tremors or visual changes  Endocrine ROS: negative for - hot flashes, malaise/lethargy, palpitations, polydipsia/polyuria, skin changes, temperature intolerance  "  Hematological and Lymphatic ROS: negative for - bruising, night sweats or pallor    Blood pressure 128/78, pulse 81, temperature 36.6 °C (97.8 °F), resp. rate 16, height 1.778 m (5' 10\"), weight 106 kg (233 lb), SpO2 95 %.    Physical Examination:   General appearance - alert, well appearing, and in no distress  HEENT EOMI, PEERLA, normal eyelids.  Oropharynx no erythema or exudate.  Normal tonsils.    Ears -external auditory canal normal bilaterally.  Tympanic membranes normal bilaterally.  Mouth - mucous membranes moist, pharynx normal without lesions  Neck - supple, trachea central no thyromegaly.  No significant adenopathy  Chest -good air entry bilaterally, no rhonchi rales and no wheezes.  Heart -Normal S1 and S2, regular rate and rhythm with no murmurs gallop or rub.  Abdomen -Non distended, soft, nontender with no guarding, no palpable mass and no CVA tenderness.  Bowel sounds normal.  No hernia.  Neurological - alert, oriented, cranial nerves II through XII normal.  Reflexes 2+ bilaterally.  No focal deficit.  Extremities: peripheral pulses normal, no clubbing or cyanosis.    Problem List Items Addressed This Visit       Essential hypertension    Current Assessment & Plan     Dietary sodium restriction.  Regular aerobic exercise program is recommended to help achieve and maintain normal body weight, fitness and improve lipid balance. .  Dietary changes: Increase soluble fiber  Plant sterols 2grams per day (e.g. Benecol)  Reduce saturated fat, \"trans\" monounsaturated fatty acids, and cholesterol           Relevant Orders    CBC and Auto Differential    Comprehensive Metabolic Panel    Lipid Panel    Follow Up In [facility] - PCP    Generalized anxiety disorder    Current Assessment & Plan     Handouts describing disease, natural history, and treatment were given to the patient.  Reviewed concept of anxiety as biochemical imbalance of neurotransmitters and rationale for treatment.  Instructed " patient to contact office or on-call physician promptly should condition worsen or any new symptoms appear and provided on-call telephone numbers. IF THE PATIENT HAS ANY SUICIDAL OR HOMICIDAL IDEATIONS, CALL THE OFFICE, DISCUSS WITH A SUPPORT MEMBER, OR GO TO THE ER IMMEDIATELY. Patient was agreeable with this plan.           Intermittent claudication (CMS/HCC)    Current Assessment & Plan     Chronic Condition Documentation : Stable based on symptoms and exam.  Continue established treatment plan and follow-up at least yearly.           Mixed hyperlipidemia    Current Assessment & Plan     The nature of cardiac risk has been fully discussed with this patient. Discussed cardiovascular risk analysis and appropriate diet with the need for lifelong measures to reduce the risk. A regular exercise program is recommended to help achieve and maintain normal body weight, fitness and improve lipid balance. Patient education provided. They understand and agree with this course of treatment. They will return with new or worsening symptoms. Patient instructed to remain current with appropriate annual health maintenance.            Relevant Orders    CBC and Auto Differential    Comprehensive Metabolic Panel    Lipid Panel    Follow Up In [facility] - PCP    Moderate episode of recurrent major depressive disorder (CMS/HCC)    Current Assessment & Plan     Chronic Condition Documentation : Stable based on symptoms and exam.  Continue established treatment plan and follow-up at least yearly.           Healthcare maintenance - Primary    Type 2 diabetes mellitus without complication, with long-term current use of insulin (CMS/HCC)    Current Assessment & Plan     Diabetes Mellitus type II, under poor control.  1. Rx changes: Increase Basaglar to 75 units daily, Discontinue Rybelsus and start Mounjaro 10 mg weekly.  2. Education: Reviewed ‘ABCs’ of diabetes management (respective goals in parentheses):  A1C (<7), blood  "pressure (<130/80), and cholesterol (LDL <100).  3. Compliance at present is estimated to be poor. Efforts to improve compliance (if necessary) will be directed at dietary modifications: and increased exercise.  4. Follow up: 3 months           Relevant Medications    blood-glucose sensor (Dexcom G6 Sensor) device    Dexcom G4 platinum  (Dexcom G6 ) misc    Dexcom G4 platinum transmitter (Dexcom G6 Transmitter) device    tirzepatide (Mounjaro) 10 mg/0.5 mL pen injector    Other Relevant Orders    CBC and Auto Differential    Comprehensive Metabolic Panel    Hemoglobin A1C    Lipid Panel    Albumin , Urine Random    Follow Up In [facility] - PCP     Other Visit Diagnoses       Encounter for screening for malignant neoplasm of prostate        Relevant Orders    Prostate Specific Antigen, Screen    Mass of upper outer quadrant of right breast        Chronic allergic rhinitis        Relevant Medications    fluticasone (Flonase) 50 mcg/actuation nasal spray             Outpatient Encounter Medications as of 4/27/2023   Medication Sig Dispense Refill    aspirin 81 mg chewable tablet Chew 1 tablet (81 mg) once daily.      atorvastatin (Lipitor) 80 mg tablet Take 1 tablet (80 mg) by mouth once daily at bedtime.      Basaglar KwikPen U-100 Insulin 100 unit/mL (3 mL) pen Inject 75 Units under the skin once daily in the morning.      BD Marly 2nd Gen Pen Needle 32 gauge x 5/32\" needle USE 1 DAILY AS DIRECTED      cyclobenzaprine (Flexeril) 10 mg tablet Take 1 tablet (10 mg) by mouth once daily at bedtime.      diclofenac (Voltaren) 50 mg EC tablet Take 1 tablet (50 mg) by mouth every 8 hours if needed.      escitalopram (Lexapro) 20 mg tablet Take 1 tablet (20 mg) by mouth once daily.      Farxiga 10 mg Take 1 tablet (10 mg) by mouth once daily in the morning. Take before meals.      fenofibrate (Triglide) 160 mg tablet Take 1 tablet (160 mg) by mouth once daily.      icosapent ethyL (Vascepa) 1 gram " capsule Take 2 capsules (2 g) by mouth 2 times a day.      insulin lispro (HumaLOG U-100 Insulin) 100 unit/mL injection FILL V-GO PATCH WITH 100 UNITS DAILY AS DIRECTED 30 mL 3    lisinopril 5 mg tablet Take 1 tablet (5 mg) by mouth once daily.      methocarbamol (Robaxin) 500 mg tablet Take 1 tablet (500 mg) by mouth every 8 hours.      metoprolol tartrate (Lopressor) 25 mg tablet Take 1 tablet (25 mg) by mouth 2 times a day.      nitroglycerin (Nitrostat) 0.4 mg SL tablet Place 1 tablet (0.4 mg) under the tongue.      omeprazole (PriLOSEC) 20 mg DR capsule Take 1 capsule (20 mg) by mouth.      traZODone (Desyrel) 50 mg tablet Take 1 tablet (50 mg) by mouth once daily at bedtime.      V-GO 40 device USE DAILY AS DIRECTED      [DISCONTINUED] Rybelsus 14 mg tablet tablet Take 1 tablet (14 mg) by mouth once daily.      blood-glucose sensor (Dexcom G6 Sensor) device USE TO CHECK BLOOD SUGAR 4 TIMES DAILY 3 each 3    Dexcom G4 platinum  (Dexcom G6 ) misc USE TO CHECK BLOOD SUGAR 4 TIMES DAILY 1 each 0    Dexcom G4 platinum transmitter (Dexcom G6 Transmitter) device USE TO CHECK BLOOD SUGAR 4 TIMES DAILY 1 each 0    fluticasone (Flonase) 50 mcg/actuation nasal spray Administer 2 sprays into each nostril once daily. 16 g 3    tirzepatide (Mounjaro) 10 mg/0.5 mL pen injector Inject 10 mg under the skin 1 (one) time per week. 2 mL 2    [DISCONTINUED] lisinopril 10 mg tablet Take 1 tablet (10 mg) by mouth once daily.       No facility-administered encounter medications on file as of 4/27/2023.   He was advised to bring the record of his home blood glucose monitoring for review in 2 weeks time.  Patient to keep food diary and log of blood sugars and bring to next office visit. Patient encouraged to increase activity level gradually and encouraged weight loss. Strongly encouraged to maintain blood sugar at levels as close to normal as possible thus preventing or delaying complications (regular medical care is  important for this). Encouraged to follow a balanced meal plan. Eat consistent and moderate amounts of food at regular times. Nuts and peanut butter are a good choice for a snack. Advised patient to not skip meals. Recommended that patient: Eat plenty of vegetables and fiber, limited amounts of fat, moderate amounts of protein and low-fat dairy products, and carefully limit foods containing high concentrated sugar. Keep a record of your food intake. We will review at the next visit. Educated patient about exercise. Exercise lowers blood glucose levels. Regular exercise (eg, 30-60 minutes of walking every day) can help keep blood glucose in better control. Exercise increases insulin sensitivity and helps an individual lose weight. It can also help overall health.    Scribe Attestation  By signing my name below, IAndrey Scribe   attest that this documentation has been prepared under the direction and in the presence of Ez Maier MD.

## 2023-05-03 ENCOUNTER — TELEPHONE (OUTPATIENT)
Dept: PRIMARY CARE | Facility: CLINIC | Age: 58
End: 2023-05-03
Payer: COMMERCIAL

## 2023-05-03 NOTE — TELEPHONE ENCOUNTER
Patient is aware of the results of his recent mammogram. He is wondering if he should be worried about the swollen lymph nodes or the pain in his side? He would like to know if he should be worried about a possible infection?  Please Advise

## 2023-05-09 ENCOUNTER — OFFICE VISIT (OUTPATIENT)
Dept: PRIMARY CARE | Facility: CLINIC | Age: 58
End: 2023-05-09
Payer: COMMERCIAL

## 2023-05-09 VITALS
OXYGEN SATURATION: 97 % | HEART RATE: 95 BPM | DIASTOLIC BLOOD PRESSURE: 85 MMHG | BODY MASS INDEX: 32.57 KG/M2 | TEMPERATURE: 97.4 F | WEIGHT: 227 LBS | SYSTOLIC BLOOD PRESSURE: 127 MMHG | RESPIRATION RATE: 16 BRPM

## 2023-05-09 DIAGNOSIS — A08.4 VIRAL GASTROENTERITIS: Primary | ICD-10-CM

## 2023-05-09 PROCEDURE — 3074F SYST BP LT 130 MM HG: CPT | Performed by: NURSE PRACTITIONER

## 2023-05-09 PROCEDURE — 99213 OFFICE O/P EST LOW 20 MIN: CPT | Performed by: NURSE PRACTITIONER

## 2023-05-09 PROCEDURE — 1036F TOBACCO NON-USER: CPT | Performed by: NURSE PRACTITIONER

## 2023-05-09 PROCEDURE — 3046F HEMOGLOBIN A1C LEVEL >9.0%: CPT | Performed by: NURSE PRACTITIONER

## 2023-05-09 PROCEDURE — 3079F DIAST BP 80-89 MM HG: CPT | Performed by: NURSE PRACTITIONER

## 2023-05-09 PROCEDURE — 4010F ACE/ARB THERAPY RXD/TAKEN: CPT | Performed by: NURSE PRACTITIONER

## 2023-05-09 RX ORDER — ONDANSETRON 4 MG/1
4 TABLET, ORALLY DISINTEGRATING ORAL EVERY 8 HOURS PRN
Qty: 20 TABLET | Refills: 0 | Status: SHIPPED | OUTPATIENT
Start: 2023-05-09 | End: 2023-05-16

## 2023-05-09 ASSESSMENT — ENCOUNTER SYMPTOMS
ALLERGIC/IMMUNOLOGIC NEGATIVE: 1
HEMATOLOGIC/LYMPHATIC NEGATIVE: 1
DIARRHEA: 1
ABDOMINAL PAIN: 0
SWEATS: 0
CONSTIPATION: 0
CARDIOVASCULAR NEGATIVE: 1
ANAL BLEEDING: 0
NAUSEA: 1
MUSCULOSKELETAL NEGATIVE: 1
MYALGIAS: 0
WEIGHT LOSS: 0
EYES NEGATIVE: 1
FEVER: 0
DIZZINESS: 0
BLOATING: 0
HEADACHES: 0
ARTHRALGIAS: 0
CHILLS: 0
NEUROLOGICAL NEGATIVE: 1
RECTAL PAIN: 0
RESPIRATORY NEGATIVE: 1
FLATUS: 0
BLOOD IN STOOL: 0
ABDOMINAL DISTENTION: 0
PSYCHIATRIC NEGATIVE: 1
CONSTITUTIONAL NEGATIVE: 1
ENDOCRINE NEGATIVE: 1
VOMITING: 1
COUGH: 0

## 2023-05-09 NOTE — ASSESSMENT & PLAN NOTE
Patient to take medications associated with this visit.  Advised to increase oral fluid intake as tolerated if no nausea or vomiting.  Patient will start with BRAT diet and increase as tolerated.

## 2023-05-09 NOTE — PROGRESS NOTES
Patient's PCP is Ez Maier MD      Symptoms:   Nausea, Diarrhea, Vomiting, Chills---RESOLVED, Length of Symptoms: Yesterday 05.08.2023   Denies:Fever, Runny nose, Congestion, Post Nasal Drainage, Headache, Sinus pressure on forehead and under eyes, Dry/Productive Cough, BILATERAL LEFT RIGHT Ear ache pressure or popping, Sore throat, SOB, Wheezing,Body aches, Sneezing, Fatigue,    Factors that effect symptoms:    --Related Information--

## 2023-05-09 NOTE — PATIENT INSTRUCTIONS
Patient was seen and examined.  Diagnosis, treatment, and possible complications of today's illness discussed and explained to patient.  Patient to take medications associated with this visit.  Advised to increase oral fluid intake as tolerated if no nausea or vomiting.  Patient will start with BRAT diet and increase as tolerated.  Patient educated and advised to come back if worsening or persistent symptoms. Patient educated on when to seek urgent/emergent care. Patient was given opportunity to ask questions and denied any at this time.  Patient verbalized understanding and agrees with plan of care.

## 2023-05-09 NOTE — PROGRESS NOTES
Subjective   Patient ID: Paulo Roman is a 58 y.o. male who presents for Nausea and Diarrhea.  Patient presents to convenient care appointment with complaint of nausea, vomiting and diarrhea since yesterday.  Patient reports last episode of vomiting and diarrhea were yesterday.  Patient has been drinking water today without difficulty.  Patient reports he still has nausea today.  Patient denies abdominal pain, fever, urinary symptoms nor back pain.     Diarrhea   This is a new problem. The current episode started yesterday. The problem occurs 2 to 4 times per day. The problem has been resolved. The stool consistency is described as Watery. Associated symptoms include vomiting. Pertinent negatives include no abdominal pain, arthralgias, bloating, chills, coughing, fever, headaches, increased  flatus, myalgias, sweats, URI or weight loss. Nothing aggravates the symptoms. He has tried nothing for the symptoms. There is no history of bowel resection, inflammatory bowel disease, irritable bowel syndrome, malabsorption, a recent abdominal surgery or short gut syndrome.   Vomiting   This is a new problem. The current episode started yesterday. The problem occurs 5 to 10 times per day. The problem has been resolved. There has been no fever. Associated symptoms include diarrhea. Pertinent negatives include no abdominal pain, arthralgias, chest pain, chills, coughing, dizziness, fever, headaches, myalgias, sweats, URI or weight loss. He has tried nothing for the symptoms.       Review of Systems   Constitutional: Negative.  Negative for chills, fever and weight loss.   HENT: Negative.     Eyes: Negative.    Respiratory: Negative.  Negative for cough.    Cardiovascular: Negative.  Negative for chest pain.   Gastrointestinal:  Positive for diarrhea, nausea and vomiting. Negative for abdominal distention, abdominal pain, anal bleeding, bloating, blood in stool, constipation, flatus and rectal pain.   Endocrine: Negative.     Genitourinary: Negative.    Musculoskeletal: Negative.  Negative for arthralgias and myalgias.   Skin: Negative.    Allergic/Immunologic: Negative.    Neurological: Negative.  Negative for dizziness and headaches.   Hematological: Negative.    Psychiatric/Behavioral: Negative.     All other systems reviewed and are negative.      /85   Pulse (!) 129   Temp 36.3 °C (97.4 °F)   Resp 16   Wt 103 kg (227 lb)   SpO2 97%   BMI 32.57 kg/m²     Objective   Physical Exam  Vitals and nursing note reviewed.   Constitutional:       Appearance: Normal appearance.   HENT:      Head: Normocephalic and atraumatic.      Right Ear: Tympanic membrane, ear canal and external ear normal.      Left Ear: Tympanic membrane, ear canal and external ear normal.      Nose: Nose normal.      Mouth/Throat:      Mouth: Mucous membranes are moist.      Pharynx: Oropharynx is clear.   Eyes:      Extraocular Movements: Extraocular movements intact.      Conjunctiva/sclera: Conjunctivae normal.      Pupils: Pupils are equal, round, and reactive to light.   Cardiovascular:      Rate and Rhythm: Normal rate and regular rhythm.      Pulses: Normal pulses.      Heart sounds: Normal heart sounds.   Pulmonary:      Effort: Pulmonary effort is normal. No respiratory distress.      Breath sounds: Normal breath sounds. No stridor. No wheezing, rhonchi or rales.   Chest:      Chest wall: No tenderness.   Abdominal:      General: Bowel sounds are normal. There is no distension.      Palpations: Abdomen is soft. There is no mass.      Tenderness: There is no abdominal tenderness. There is no right CVA tenderness, left CVA tenderness, guarding or rebound.      Hernia: No hernia is present.   Musculoskeletal:         General: Normal range of motion.      Cervical back: Normal range of motion and neck supple.   Skin:     General: Skin is warm and dry.      Capillary Refill: Capillary refill takes less than 2 seconds.   Neurological:      General: No  focal deficit present.      Mental Status: He is alert and oriented to person, place, and time.   Psychiatric:         Mood and Affect: Mood normal.         Behavior: Behavior normal.         Thought Content: Thought content normal.         Judgment: Judgment normal.         Assessment/Plan   Problem List Items Addressed This Visit    None

## 2023-05-09 NOTE — LETTER
May 9, 2023     Patient: Paulo Roman   YOB: 1965   Date of Visit: 5/9/2023       To Whom It May Concern:    Paulo Roman was seen in my clinic on 5/9/2023 at 12:00 pm. Please excuse Paulo for his absence from work on this day to make the appointment.  Please excuse from work 5/8/2023, 5/9/2023 and if not improved 5/10/2023 due to illness.          Sincerely,         Kelly J Slavik-Bosworth, SHAGGY-CNP        CC: No Recipients

## 2023-05-10 DIAGNOSIS — E11.9 TYPE 2 DIABETES MELLITUS WITHOUT COMPLICATION, WITH LONG-TERM CURRENT USE OF INSULIN (MULTI): ICD-10-CM

## 2023-05-10 DIAGNOSIS — Z79.4 TYPE 2 DIABETES MELLITUS WITHOUT COMPLICATION, WITH LONG-TERM CURRENT USE OF INSULIN (MULTI): ICD-10-CM

## 2023-05-10 RX ORDER — DAPAGLIFLOZIN 10 MG/1
TABLET, FILM COATED ORAL
Qty: 30 TABLET | Refills: 3 | Status: SHIPPED | OUTPATIENT
Start: 2023-05-10 | End: 2024-01-16

## 2023-05-18 ENCOUNTER — APPOINTMENT (OUTPATIENT)
Dept: PRIMARY CARE | Facility: CLINIC | Age: 58
End: 2023-05-18
Payer: COMMERCIAL

## 2023-05-18 ENCOUNTER — LAB (OUTPATIENT)
Dept: LAB | Facility: LAB | Age: 58
End: 2023-05-18
Payer: COMMERCIAL

## 2023-05-18 DIAGNOSIS — R19.7 DIARRHEA, UNSPECIFIED TYPE: Primary | ICD-10-CM

## 2023-05-18 DIAGNOSIS — R19.7 DIARRHEA, UNSPECIFIED TYPE: ICD-10-CM

## 2023-05-18 PROCEDURE — 87209 SMEAR COMPLEX STAIN: CPT

## 2023-05-18 PROCEDURE — 87328 CRYPTOSPORIDIUM AG IA: CPT

## 2023-05-18 PROCEDURE — 87329 GIARDIA AG IA: CPT

## 2023-05-18 PROCEDURE — 87075 CULTR BACTERIA EXCEPT BLOOD: CPT

## 2023-05-18 PROCEDURE — 87506 IADNA-DNA/RNA PROBE TQ 6-11: CPT

## 2023-05-18 PROCEDURE — 87177 OVA AND PARASITES SMEARS: CPT

## 2023-05-19 LAB
CAMPYLOBACTER GP: NOT DETECTED
NOROVIRUS GI/GII: NOT DETECTED
ROTAVIRUS A: NOT DETECTED
SALMONELLA SP.: NOT DETECTED
SHIGA TOXIN 1: NOT DETECTED
SHIGA TOXIN 2: NOT DETECTED
SHIGELLA SP.: NOT DETECTED
VIBRIO GRP.: NOT DETECTED
YERSINIA ENTEROCOLITICA: NOT DETECTED

## 2023-05-23 NOTE — RESULT ENCOUNTER NOTE
Patient notified of test result.  Patient reports his symptoms have resolved.  Patient will follow up with PCP as needed.

## 2023-05-25 LAB
CLOSTRIDIUM DIFFICILE CULTURE: NORMAL
CRYPTOSPORIDIUM ANTIGEN-DATA CONVERSION: NEGATIVE
GIARDIA LAMBLIA AG-DATA CONVERSION: NEGATIVE
OVA + PARASITE EXAM: NEGATIVE
SITE: NORMAL

## 2023-05-30 ENCOUNTER — OFFICE VISIT (OUTPATIENT)
Dept: PRIMARY CARE | Facility: CLINIC | Age: 58
End: 2023-05-30
Payer: COMMERCIAL

## 2023-05-30 VITALS
BODY MASS INDEX: 34.22 KG/M2 | WEIGHT: 239 LBS | DIASTOLIC BLOOD PRESSURE: 60 MMHG | HEART RATE: 96 BPM | OXYGEN SATURATION: 92 % | HEIGHT: 70 IN | TEMPERATURE: 96.2 F | SYSTOLIC BLOOD PRESSURE: 118 MMHG | RESPIRATION RATE: 16 BRPM

## 2023-05-30 DIAGNOSIS — E11.9 TYPE 2 DIABETES MELLITUS WITHOUT COMPLICATION, WITH LONG-TERM CURRENT USE OF INSULIN (MULTI): Primary | ICD-10-CM

## 2023-05-30 DIAGNOSIS — E78.2 MIXED HYPERLIPIDEMIA: ICD-10-CM

## 2023-05-30 DIAGNOSIS — F41.1 GENERALIZED ANXIETY DISORDER: ICD-10-CM

## 2023-05-30 DIAGNOSIS — I25.10 ATHEROSCLEROSIS OF NATIVE CORONARY ARTERY OF NATIVE HEART WITHOUT ANGINA PECTORIS: ICD-10-CM

## 2023-05-30 DIAGNOSIS — I10 ESSENTIAL HYPERTENSION: ICD-10-CM

## 2023-05-30 DIAGNOSIS — Z79.4 TYPE 2 DIABETES MELLITUS WITHOUT COMPLICATION, WITH LONG-TERM CURRENT USE OF INSULIN (MULTI): Primary | ICD-10-CM

## 2023-05-30 DIAGNOSIS — F51.04 CHRONIC INSOMNIA: ICD-10-CM

## 2023-05-30 DIAGNOSIS — J34.0 NASAL ULCER: ICD-10-CM

## 2023-05-30 PROCEDURE — 3078F DIAST BP <80 MM HG: CPT | Performed by: FAMILY MEDICINE

## 2023-05-30 PROCEDURE — 4010F ACE/ARB THERAPY RXD/TAKEN: CPT | Performed by: FAMILY MEDICINE

## 2023-05-30 PROCEDURE — 3046F HEMOGLOBIN A1C LEVEL >9.0%: CPT | Performed by: FAMILY MEDICINE

## 2023-05-30 PROCEDURE — 1036F TOBACCO NON-USER: CPT | Performed by: FAMILY MEDICINE

## 2023-05-30 PROCEDURE — 99213 OFFICE O/P EST LOW 20 MIN: CPT | Performed by: FAMILY MEDICINE

## 2023-05-30 PROCEDURE — 3074F SYST BP LT 130 MM HG: CPT | Performed by: FAMILY MEDICINE

## 2023-05-30 RX ORDER — SEMAGLUTIDE 1.34 MG/ML
INJECTION, SOLUTION SUBCUTANEOUS
Qty: 1 EACH | Refills: 2 | Status: SHIPPED | OUTPATIENT
Start: 2023-05-30 | End: 2023-07-27 | Stop reason: DRUGHIGH

## 2023-05-30 RX ORDER — MUPIROCIN 20 MG/G
OINTMENT TOPICAL 3 TIMES DAILY
Qty: 22 G | Refills: 0 | Status: SHIPPED | OUTPATIENT
Start: 2023-05-30 | End: 2023-07-27 | Stop reason: ALTCHOICE

## 2023-05-30 ASSESSMENT — PATIENT HEALTH QUESTIONNAIRE - PHQ9
2. FEELING DOWN, DEPRESSED OR HOPELESS: NOT AT ALL
SUM OF ALL RESPONSES TO PHQ9 QUESTIONS 1 AND 2: 0
1. LITTLE INTEREST OR PLEASURE IN DOING THINGS: NOT AT ALL

## 2023-05-30 NOTE — PROGRESS NOTES
Chief Complaint   Patient presents with    Follow-up     Diabetes   he states he had to stop Mounjaro due to it making him ill      He presents today for follow up on Diabetes Mellitus, hypertension, hyperlipidemia    Current diabetes related symptoms include: hyperglycemia. Patient denies foot ulcerations, hypoglycemia , nausea, paresthesia of the feet, polydipsia, polyuria, visual disturbances, vomiting, and weight loss.  Patient denies chest pain, claudication, dyspnea, exertional chest pressure/discomfort, irregular heart beat, lower extremity edema, orthopnea, palpitations, paroxysmal nocturnal dyspnea, and syncope. Evaluation to date has included: fasting blood sugar, fasting lipid panel, hemoglobin A1C, and microalbuminuria.  Home sugars: BGs are running  consistent with Hgb A1C.   Patient denies chest pain, claudication, dyspnea, exertional chest pressure/discomfort, irregular heart beat, lower extremity edema, orthopnea, palpitations, paroxysmal nocturnal dyspnea, and syncope.     He was taking Mounjaro and subsequently has severe abdominal bloating nausea vomiting and diarrhea.  He has since discontinued the medication.  He was on Rybelsus prior to being changed to Mounjaro.    Past Medical History:   Diagnosis Date    Other specified health status     Non-smoker     Patient Active Problem List    Diagnosis Date Noted    Diarrhea 05/18/2023    Healthcare maintenance 04/27/2023    Type 2 diabetes mellitus without complication, with long-term current use of insulin (CMS/[facility]) 04/27/2023    Acute recurrent maxillary sinusitis 03/30/2023    Atherosclerosis of coronary artery 03/29/2023    Carbuncle 03/29/2023    Carbuncle of right axilla 03/29/2023    Cellulitis of right axilla 03/29/2023    Chronic insomnia 03/29/2023    Chronic bilateral low back pain with left-sided sciatica 03/29/2023    Chronic neck pain 03/29/2023    Contusion of lower back 03/29/2023    Disease due to severe acute respiratory syndrome  coronavirus 2 (SARS-CoV-2) 03/29/2023    Adhesive capsulitis of left shoulder 03/29/2023    Dupuytren's contracture of both hands 03/29/2023    Essential hypertension 03/29/2023    Generalized anxiety disorder 03/29/2023    Intermittent claudication (CMS/HCC) 03/29/2023    Back pain 03/29/2023    Left hip pain 03/29/2023    Mixed hyperlipidemia 03/29/2023    Moderate episode of recurrent major depressive disorder (CMS/HCC) 03/29/2023    Muscle strain 03/29/2023    Nasal congestion with rhinorrhea 03/29/2023    Nasopharyngitis 03/29/2023    Perianal abscess 03/29/2023    Persistent cough 03/29/2023    Osteoarthritis 03/29/2023    Polyarthritis 03/29/2023    Primary localized osteoarthritis of left hip 03/29/2023    Rotator cuff injury, left, initial encounter 03/29/2023    Shift work sleep disorder 03/29/2023    Shortness of breath on exertion 03/29/2023    Shoulder pain 03/29/2023    Strain of lumbar region 03/29/2023    Viral gastroenteritis 03/29/2023     Past Surgical History:   Procedure Laterality Date    OTHER SURGICAL HISTORY  05/20/2019    Rotator cuff repair    OTHER SURGICAL HISTORY  05/20/2019    Knee surgery    OTHER SURGICAL HISTORY  11/17/2019    Colonoscopy    OTHER SURGICAL HISTORY  09/26/2021    Cardiac catheterization with stent placement    OTHER SURGICAL HISTORY  10/19/2022    Shoulder surgery     No family history on file.    Social History     Socioeconomic History    Marital status: Single     Spouse name: None    Number of children: None    Years of education: None    Highest education level: None   Occupational History    None   Tobacco Use    Smoking status: Never     Passive exposure: Never    Smokeless tobacco: Never   Vaping Use    Vaping status: Never Used     Passive vaping exposure: Yes   Substance and Sexual Activity    Alcohol use: Never    Drug use: Never    Sexual activity: None   Other Topics Concern    None   Social History Narrative    None     Social Determinants of Health  "    Financial Resource Strain: Not on file   Food Insecurity: Not on file   Transportation Needs: Not on file   Physical Activity: Not on file   Stress: Not on file   Social Connections: Not on file   Intimate Partner Violence: Not on file   Housing Stability: Not on file     Current Outpatient Medications   Medication Sig Dispense Refill    aspirin 81 mg chewable tablet Chew 1 tablet (81 mg) once daily.      atorvastatin (Lipitor) 80 mg tablet Take 1 tablet (80 mg) by mouth once daily at bedtime.      Basaglar KwikPen U-100 Insulin 100 unit/mL (3 mL) pen Inject 75 Units under the skin once daily in the morning.      BD Marly 2nd Gen Pen Needle 32 gauge x 5/32\" needle USE 1 DAILY AS DIRECTED      blood-glucose sensor (Dexcom G6 Sensor) device USE TO CHECK BLOOD SUGAR 4 TIMES DAILY 3 each 3    cyclobenzaprine (Flexeril) 10 mg tablet Take 1 tablet (10 mg) by mouth once daily at bedtime.      Dexcom G4 platinum  (Dexcom G6 ) misc USE TO CHECK BLOOD SUGAR 4 TIMES DAILY 1 each 0    Dexcom G4 platinum transmitter (Dexcom G6 Transmitter) device USE TO CHECK BLOOD SUGAR 4 TIMES DAILY 1 each 0    diclofenac (Voltaren) 50 mg EC tablet Take 1 tablet (50 mg) by mouth every 8 hours if needed.      escitalopram (Lexapro) 20 mg tablet Take 1 tablet (20 mg) by mouth once daily.      Farxiga 10 mg TAKE ONE TABLET BY MOUTH EVERY DAY IN THE MORNING 30 tablet 3    fenofibrate (Triglide) 160 mg tablet Take 1 tablet (160 mg) by mouth once daily.      fluticasone (Flonase) 50 mcg/actuation nasal spray Administer 2 sprays into each nostril once daily. 16 g 3    icosapent ethyL (Vascepa) 1 gram capsule Take 2 capsules (2 g) by mouth 2 times a day.      insulin lispro (HumaLOG U-100 Insulin) 100 unit/mL injection FILL V-GO PATCH WITH 100 UNITS DAILY AS DIRECTED 30 mL 3    lisinopril 5 mg tablet Take 1 tablet (5 mg) by mouth once daily.      methocarbamol (Robaxin) 500 mg tablet Take 1 tablet (500 mg) by mouth every 8 hours.   " "   metoprolol tartrate (Lopressor) 25 mg tablet Take 1 tablet (25 mg) by mouth 2 times a day.      nitroglycerin (Nitrostat) 0.4 mg SL tablet Place 1 tablet (0.4 mg) under the tongue.      omeprazole (PriLOSEC) 20 mg DR capsule Take 1 capsule (20 mg) by mouth.      traZODone (Desyrel) 50 mg tablet Take 1 tablet (50 mg) by mouth once daily at bedtime.      V-GO 40 device USE DAILY AS DIRECTED      mupirocin (Bactroban) 2 % ointment Apply topically 3 times a day. apply to affected area 22 g 0    semaglutide (Ozempic) 0.25 mg or 0.5 mg(2 mg/1.5 mL) pen injector Inject 0.25 mg subcutaneous weekly x 4 week then increase to 0.5 mg weekly 1 each 2     No current facility-administered medications for this visit.     Current Outpatient Medications on File Prior to Visit   Medication Sig Dispense Refill    aspirin 81 mg chewable tablet Chew 1 tablet (81 mg) once daily.      atorvastatin (Lipitor) 80 mg tablet Take 1 tablet (80 mg) by mouth once daily at bedtime.      Basaglar KwikPen U-100 Insulin 100 unit/mL (3 mL) pen Inject 75 Units under the skin once daily in the morning.      BD Marly 2nd Gen Pen Needle 32 gauge x 5/32\" needle USE 1 DAILY AS DIRECTED      blood-glucose sensor (Dexcom G6 Sensor) device USE TO CHECK BLOOD SUGAR 4 TIMES DAILY 3 each 3    cyclobenzaprine (Flexeril) 10 mg tablet Take 1 tablet (10 mg) by mouth once daily at bedtime.      Dexcom G4 platinum  (Dexcom G6 ) misc USE TO CHECK BLOOD SUGAR 4 TIMES DAILY 1 each 0    Dexcom G4 platinum transmitter (Dexcom G6 Transmitter) device USE TO CHECK BLOOD SUGAR 4 TIMES DAILY 1 each 0    diclofenac (Voltaren) 50 mg EC tablet Take 1 tablet (50 mg) by mouth every 8 hours if needed.      escitalopram (Lexapro) 20 mg tablet Take 1 tablet (20 mg) by mouth once daily.      Farxiga 10 mg TAKE ONE TABLET BY MOUTH EVERY DAY IN THE MORNING 30 tablet 3    fenofibrate (Triglide) 160 mg tablet Take 1 tablet (160 mg) by mouth once daily.      fluticasone " (Flonase) 50 mcg/actuation nasal spray Administer 2 sprays into each nostril once daily. 16 g 3    icosapent ethyL (Vascepa) 1 gram capsule Take 2 capsules (2 g) by mouth 2 times a day.      insulin lispro (HumaLOG U-100 Insulin) 100 unit/mL injection FILL V-GO PATCH WITH 100 UNITS DAILY AS DIRECTED 30 mL 3    lisinopril 5 mg tablet Take 1 tablet (5 mg) by mouth once daily.      methocarbamol (Robaxin) 500 mg tablet Take 1 tablet (500 mg) by mouth every 8 hours.      metoprolol tartrate (Lopressor) 25 mg tablet Take 1 tablet (25 mg) by mouth 2 times a day.      nitroglycerin (Nitrostat) 0.4 mg SL tablet Place 1 tablet (0.4 mg) under the tongue.      omeprazole (PriLOSEC) 20 mg DR capsule Take 1 capsule (20 mg) by mouth.      traZODone (Desyrel) 50 mg tablet Take 1 tablet (50 mg) by mouth once daily at bedtime.      V-GO 40 device USE DAILY AS DIRECTED      [DISCONTINUED] tirzepatide (Mounjaro) 10 mg/0.5 mL pen injector Inject 10 mg under the skin 1 (one) time per week. (Patient not taking: Reported on 5/30/2023) 2 mL 2     No current facility-administered medications on file prior to visit.     No Known Allergies       Review of Systems - General ROS: negative for - fatigue, fever, malaise, night sweats, sleep disturbance or weight loss  Psychological ROS: negative for - anxiety, concentration difficulties, depression, memory difficulties or sleep disturbances  ENT ROS: negative for - hearing change, nasal discharge, oral lesions, sinus pain, sore throat, tinnitus or vertigo  Allergy and Immunology ROS: negative for - hives, nasal congestion or seasonal allergies  Hematological and Lymphatic ROS: negative for - bruising, fatigue, night sweats or pallor  Endocrine ROS: negative for - hot flashes, malaise/lethargy, palpitations, polydipsia/polyuria, skin changes, temperature intolerance or unexpected weight changes  Respiratory ROS: negative for - cough, hemoptysis, pleuritic pain, shortness of breath or  "wheezing  Cardiovascular ROS: no chest pain or dyspnea on exertion  Gastrointestinal ROS: no abdominal pain, change in bowel habits, or black or bloody stools  Genito-Urinary ROS: no dysuria, trouble voiding, or hematuria  Musculoskeletal ROS: negative for - joint pain, joint stiffness, joint swelling, muscle pain or muscular weakness  Neurological ROS: negative for - dizziness, gait disturbance, headaches, impaired coordination/balance, numbness/tingling, tremors or visual changes  Dermatological ROS: negative for - dry skin, lumps, pruritus or rash      Blood pressure 118/60, pulse 96, temperature 35.7 °C (96.2 °F), resp. rate 16, height 1.778 m (5' 10\"), weight 108 kg (239 lb), SpO2 92 %.    Physical Examination: General appearance - alert, well appearing, and in no distress  Mental status - alert, oriented to person, place, and time  Eyes - pupils equal and reactive, extraocular eye movements intact  Ears - bilateral TM's and external ear canals normal  Mouth - mucous membranes moist, pharynx normal without lesions  Neck - supple, no significant adenopathy  Lymphatics - no palpable lymphadenopathy, no hepatosplenomegaly  Chest - clear to auscultation, no wheezes, rales or rhonchi, symmetric air entry  Heart - normal rate, regular rhythm, normal S1, S2, no murmurs, rubs, clicks or gallops  Abdomen - soft, nontender, nondistended, no masses or organomegaly  Neurological - alert, oriented, normal speech, no focal findings or movement disorder noted  Musculoskeletal - no joint tenderness, deformity or swelling  Extremities: peripheral pulses normal, no pedal edema, no clubbing or cyanosis.      Lab on 05/18/2023   Component Date Value Ref Range Status    Yersinia Enterocolitica 05/18/2023 NOT DETECTED  NOT DETECTED Final    Campylobacter gp. 05/18/2023 NOT DETECTED  NOT DETECTED Final    Salmonella sp. 05/18/2023 NOT DETECTED  NOT DETECTED Final    Shigella sp. 05/18/2023 NOT DETECTED  NOT DETECTED Final    Vibrio " "grp. 05/18/2023 NOT DETECTED  NOT DETECTED Final    Shiga Toxin 1 05/18/2023 NOT DETECTED  NOT DETECTED Final    Shiga Toxin 2 05/18/2023 NOT DETECTED  NOT DETECTED Final    Norovirus GI/GII 05/18/2023 NOT DETECTED  NOT DETECTED Final    Rotavirus A 05/18/2023 NOT DETECTED  NOT DETECTED Final     The enteric PCR panel is a panel of sensitive and specific   amplified nucleic acid tests indicated as an aid in the   diagnosis of specific bacterial and viral agents of   gastrointestinal illness, in conjunction with other   clinical, laboratory, and epidemiological information.   This test is not approved for monitoring these infections.   Monitoring is available for Salmonella and Shigella   infections-request test \"Stool PCR Follow-Up (STLPF)\".   Monitoring tests are not available at this time for other   enteric agents in this panel.    Site 05/18/2023 STOOL   Final    Clostridium Difficile Culture 05/18/2023 SEE COMMENT   Final    CULTURE NEGATIVE FOR CLOSTRIDIUM DIFFICILE     Ova + Parasite Exam 05/18/2023 Negative  Negative Final    INTERPRETIVE INFORMATION: Ova and Parasite, Fecal  Method for identification of Ova and Parasites includes wet mount   and trichrome stains.  Due to the various shedding cycles of many parasites, three   separate stool specimens collected over a 5-7-day period are   recommended for ova and parasite examination. A single negative   result does not rule out the possibility of a parasitic infection.   The ova and parasite exam does not specifically detect   Cryptosporidium, Cyclospora, Cystoisospora, and Microsporidia. For   additional test information refer to [employer] consult,   [email or web address]  Performed By: [facility]  [address] [place] [id number]  : Semaj Lopez MD, PhD    Giardia lamblia Antigen 05/18/2023 Negative  Negative Final    Performed By: [facility]  [address], [place] " [id number]  : Semaj Lopez MD, PhD    Cryptosporidum Antigen by [facility] 05/18/2023 Negative  Negative Final    Performed By: [facility]  [address] [place] [address]  : Semaj Lopez MD, PhD       Problem List Items Addressed This Visit       Atherosclerosis of coronary artery    Chronic insomnia    Essential hypertension    Generalized anxiety disorder    Mixed hyperlipidemia    Type 2 diabetes mellitus without complication, with long-term current use of insulin (CMS/[facility]) - Primary    Current Assessment & Plan     Diabetes Mellitus type II, under poor control.  1. Rx changes: Start Ozempic weekly.  2. Education: Reviewed ‘ABCs’ of diabetes management (respective goals in parentheses):  A1C (<7), blood pressure (<130/80), and cholesterol (LDL <100).  3. Compliance at present is estimated to be poor. Efforts to improve compliance (if necessary) will be directed at dietary modifications: and increased exercise.  4. Follow up: as scheduled           Relevant Medications    semaglutide (Ozempic) 0.25 mg or 0.5 mg(2 mg/1.5 mL) pen injector     Other Visit Diagnoses       Nasal ulcer        Relevant Medications    mupirocin (Bactroban) 2 % ointment          Patient to keep food diary and log of blood sugars and bring to next office visit. Patient encouraged to increase activity level gradually and encouraged weight loss. Strongly encouraged to maintain blood sugar at levels as close to normal as possible thus preventing or delaying complications (regular medical care is important for this). Encouraged to follow a balanced meal plan. Eat consistent and moderate amounts of food at regular times. Nuts and peanut butter are a good choice for a snack. Advised patient to not skip meals. Recommended that patient: Eat plenty of vegetables and fiber, limited amounts of fat, moderate amounts of protein and low-fat dairy products, and carefully limit foods containing high  concentrated sugar. Keep a record of your food intake. We will review at the next visit. Educated patient about exercise. Exercise lowers blood glucose levels. Regular exercise (eg, 30-60 minutes of walking every day) can help keep blood glucose in better control. Exercise increases insulin sensitivity and helps an individual lose weight. It can also help overall health.     Scribe Attestation  By signing my name below, IAndrey Scribe   attest that this documentation has been prepared under the direction and in the presence of Ez Maier MD.

## 2023-05-30 NOTE — ASSESSMENT & PLAN NOTE
Diabetes Mellitus type II, under poor control.  1. Rx changes: Start Ozempic weekly.  2. Education: Reviewed ‘ABCs’ of diabetes management (respective goals in parentheses):  A1C (<7), blood pressure (<130/80), and cholesterol (LDL <100).  3. Compliance at present is estimated to be poor. Efforts to improve compliance (if necessary) will be directed at dietary modifications: and increased exercise.  4. Follow up: as scheduled

## 2023-06-19 DIAGNOSIS — Z79.4 TYPE 2 DIABETES MELLITUS WITHOUT COMPLICATION, WITH LONG-TERM CURRENT USE OF INSULIN (MULTI): Primary | ICD-10-CM

## 2023-06-19 DIAGNOSIS — E11.9 TYPE 2 DIABETES MELLITUS WITHOUT COMPLICATION, WITH LONG-TERM CURRENT USE OF INSULIN (MULTI): Primary | ICD-10-CM

## 2023-06-19 RX ORDER — SUB-Q INSULIN DEVICE, 40 UNIT
EACH MISCELLANEOUS
Qty: 90 KIT | Refills: 1 | Status: SHIPPED | OUTPATIENT
Start: 2023-06-19 | End: 2024-01-15

## 2023-07-07 ENCOUNTER — OFFICE VISIT (OUTPATIENT)
Dept: PRIMARY CARE | Facility: CLINIC | Age: 58
End: 2023-07-07
Payer: COMMERCIAL

## 2023-07-07 VITALS
BODY MASS INDEX: 34.72 KG/M2 | RESPIRATION RATE: 16 BRPM | OXYGEN SATURATION: 99 % | DIASTOLIC BLOOD PRESSURE: 89 MMHG | HEART RATE: 96 BPM | SYSTOLIC BLOOD PRESSURE: 138 MMHG | TEMPERATURE: 97.4 F | WEIGHT: 242 LBS

## 2023-07-07 DIAGNOSIS — M54.40 ACUTE LEFT-SIDED LOW BACK PAIN WITH SCIATICA, SCIATICA LATERALITY UNSPECIFIED: Primary | ICD-10-CM

## 2023-07-07 PROCEDURE — 99213 OFFICE O/P EST LOW 20 MIN: CPT | Performed by: NURSE PRACTITIONER

## 2023-07-07 PROCEDURE — 3079F DIAST BP 80-89 MM HG: CPT | Performed by: NURSE PRACTITIONER

## 2023-07-07 PROCEDURE — 1036F TOBACCO NON-USER: CPT | Performed by: NURSE PRACTITIONER

## 2023-07-07 PROCEDURE — 3046F HEMOGLOBIN A1C LEVEL >9.0%: CPT | Performed by: NURSE PRACTITIONER

## 2023-07-07 PROCEDURE — 3075F SYST BP GE 130 - 139MM HG: CPT | Performed by: NURSE PRACTITIONER

## 2023-07-07 PROCEDURE — 4010F ACE/ARB THERAPY RXD/TAKEN: CPT | Performed by: NURSE PRACTITIONER

## 2023-07-07 RX ORDER — CYCLOBENZAPRINE HCL 10 MG
10 TABLET ORAL NIGHTLY
Qty: 10 TABLET | Refills: 0 | Status: SHIPPED | OUTPATIENT
Start: 2023-07-07 | End: 2023-08-22 | Stop reason: ALTCHOICE

## 2023-07-07 RX ORDER — NAPROXEN 500 MG/1
500 TABLET ORAL 2 TIMES DAILY PRN
Qty: 60 TABLET | Refills: 0 | Status: SHIPPED | OUTPATIENT
Start: 2023-07-07 | End: 2023-07-27 | Stop reason: ALTCHOICE

## 2023-07-07 ASSESSMENT — ENCOUNTER SYMPTOMS
RESPIRATORY NEGATIVE: 1
CARDIOVASCULAR NEGATIVE: 1
WEAKNESS: 0
PARESIS: 0
NEUROLOGICAL NEGATIVE: 1
TINGLING: 0
EYES NEGATIVE: 1
ENDOCRINE NEGATIVE: 1
LEG PAIN: 0
DYSURIA: 0
CONSTITUTIONAL NEGATIVE: 1
FEVER: 0
HEMATOLOGIC/LYMPHATIC NEGATIVE: 1
ALLERGIC/IMMUNOLOGIC NEGATIVE: 1
PSYCHIATRIC NEGATIVE: 1
HEADACHES: 0
GASTROINTESTINAL NEGATIVE: 1
BACK PAIN: 1
NUMBNESS: 0
ABDOMINAL PAIN: 0
WEIGHT LOSS: 0
PARESTHESIAS: 0
BOWEL INCONTINENCE: 0
PERIANAL NUMBNESS: 0

## 2023-07-07 NOTE — PROGRESS NOTES
Subjective   Patient ID: Paulo Roman is a 58 y.o. male who presents for Back Pain (Lower Back Pain/Pain Scale:  7 out of 10 /Onset: 1 Week /OTC: Ibuprofen /Denies:  Fall, Injury ).  Patient presents to convenient care appointment with complaint of lower back pain x 1 week. Patient denies injury to back.  Patient reports pain when sitting and movement to standing or sitting position.  Patient has been taking ibuprofen with minimal relief.  Patient denies loss of bladder or bowel control, numbness to lower extremities, abdominal pain, chest pain, shortness of breath, nausea, vomiting nor diarrhea.       Back Pain  This is a new problem. The current episode started in the past 7 days. The problem occurs constantly. The pain is present in the lumbar spine and sacro-iliac. The quality of the pain is described as aching. The pain radiates to the right thigh. The pain is at a severity of 7/10. The pain is moderate. The pain is The same all the time. The symptoms are aggravated by bending, sitting, twisting, standing and position. Stiffness is present All day. Pertinent negatives include no abdominal pain, bladder incontinence, bowel incontinence, chest pain, dysuria, fever, headaches, leg pain, numbness, paresis, paresthesias, pelvic pain, perianal numbness, tingling, weakness or weight loss. Risk factors include lack of exercise and poor posture. He has tried NSAIDs for the symptoms. The treatment provided mild relief.       Review of Systems   Constitutional: Negative.  Negative for fever and weight loss.   HENT: Negative.     Eyes: Negative.    Respiratory: Negative.     Cardiovascular: Negative.  Negative for chest pain.   Gastrointestinal: Negative.  Negative for abdominal pain and bowel incontinence.   Endocrine: Negative.    Genitourinary: Negative.  Negative for bladder incontinence, dysuria and pelvic pain.   Musculoskeletal:  Positive for back pain.   Skin: Negative.    Allergic/Immunologic: Negative.     Neurological: Negative.  Negative for tingling, weakness, numbness, headaches and paresthesias.   Hematological: Negative.    Psychiatric/Behavioral: Negative.     All other systems reviewed and are negative.      /89   Pulse 96   Temp 36.3 °C (97.4 °F)   Resp 16   Wt 110 kg (242 lb)   SpO2 99%   BMI 34.72 kg/m²     Objective   Physical Exam  Vitals and nursing note reviewed.   Constitutional:       Appearance: Normal appearance.   HENT:      Head: Normocephalic and atraumatic.      Right Ear: Tympanic membrane, ear canal and external ear normal.      Left Ear: Tympanic membrane, ear canal and external ear normal.      Nose: Nose normal.      Mouth/Throat:      Mouth: Mucous membranes are moist.      Pharynx: Oropharynx is clear.   Eyes:      Extraocular Movements: Extraocular movements intact.      Conjunctiva/sclera: Conjunctivae normal.      Pupils: Pupils are equal, round, and reactive to light.   Cardiovascular:      Rate and Rhythm: Normal rate and regular rhythm.      Pulses: Normal pulses.      Heart sounds: Normal heart sounds.   Pulmonary:      Effort: Pulmonary effort is normal.      Breath sounds: Normal breath sounds.   Musculoskeletal:         General: Tenderness present. No swelling, deformity or signs of injury. Normal range of motion.      Cervical back: Normal range of motion and neck supple.      Right lower leg: No edema.      Left lower leg: No edema.      Comments: Patient has increased tenderness to left lumbar area lateral to spine.  Patient has full active and passive ROM, limited only by pain with flexion of hips.    Skin:     General: Skin is warm and dry.      Capillary Refill: Capillary refill takes less than 2 seconds.   Neurological:      General: No focal deficit present.      Mental Status: He is alert and oriented to person, place, and time.   Psychiatric:         Mood and Affect: Mood normal.         Behavior: Behavior normal.         Assessment/Plan   Problem List  Items Addressed This Visit    None

## 2023-07-07 NOTE — PATIENT INSTRUCTIONS
Patient was seen and examined.  Diagnosis, treatment, and possible complications of today's illness discussed and explained to patient.  Patient to take medications associated with this visit.  Patient to perform slow controlled stretches as discussed at visit.  Patient will ice after exercise and take flexeril at night for lower back discomfort. Patient educated and advised to come back if worsening or persistent symptoms. Patient educated on when to seek urgent/emergent care. Patient was given opportunity to ask questions and denied any at this time.  Patient verbalized understanding and agrees with plan of care.

## 2023-07-07 NOTE — ASSESSMENT & PLAN NOTE
Patient to take medications associated with this visit.  Patient to perform slow controlled stretches as discussed at visit.  Patient will ice after exercise and take flexeril at night for lower back discomfort.

## 2023-07-21 LAB
C REACTIVE PROTEIN (MG/L) IN SER/PLAS: 0.2 MG/DL
SEDIMENTATION RATE, ERYTHROCYTE: 6 MM/H (ref 0–20)

## 2023-07-26 ENCOUNTER — LAB (OUTPATIENT)
Dept: LAB | Facility: LAB | Age: 58
End: 2023-07-26
Payer: COMMERCIAL

## 2023-07-26 DIAGNOSIS — I10 ESSENTIAL HYPERTENSION: ICD-10-CM

## 2023-07-26 DIAGNOSIS — Z79.4 TYPE 2 DIABETES MELLITUS WITHOUT COMPLICATION, WITH LONG-TERM CURRENT USE OF INSULIN (MULTI): ICD-10-CM

## 2023-07-26 DIAGNOSIS — Z12.5 ENCOUNTER FOR SCREENING FOR MALIGNANT NEOPLASM OF PROSTATE: ICD-10-CM

## 2023-07-26 DIAGNOSIS — E11.9 TYPE 2 DIABETES MELLITUS WITHOUT COMPLICATION, WITH LONG-TERM CURRENT USE OF INSULIN (MULTI): ICD-10-CM

## 2023-07-26 DIAGNOSIS — E78.2 MIXED HYPERLIPIDEMIA: ICD-10-CM

## 2023-07-26 LAB
ALANINE AMINOTRANSFERASE (SGPT) (U/L) IN SER/PLAS: 25 U/L (ref 10–52)
ALBUMIN (G/DL) IN SER/PLAS: 4.5 G/DL (ref 3.4–5)
ALKALINE PHOSPHATASE (U/L) IN SER/PLAS: 57 U/L (ref 33–120)
ANION GAP IN SER/PLAS: 13 MMOL/L (ref 10–20)
ASPARTATE AMINOTRANSFERASE (SGOT) (U/L) IN SER/PLAS: 17 U/L (ref 9–39)
BASOPHILS (10*3/UL) IN BLOOD BY AUTOMATED COUNT: 0.04 X10E9/L (ref 0–0.1)
BASOPHILS/100 LEUKOCYTES IN BLOOD BY AUTOMATED COUNT: 0.8 % (ref 0–2)
BILIRUBIN TOTAL (MG/DL) IN SER/PLAS: 0.7 MG/DL (ref 0–1.2)
CALCIUM (MG/DL) IN SER/PLAS: 9.5 MG/DL (ref 8.6–10.3)
CARBON DIOXIDE, TOTAL (MMOL/L) IN SER/PLAS: 29 MMOL/L (ref 21–32)
CHLORIDE (MMOL/L) IN SER/PLAS: 104 MMOL/L (ref 98–107)
CHOLESTEROL (MG/DL) IN SER/PLAS: 204 MG/DL (ref 0–199)
CHOLESTEROL IN HDL (MG/DL) IN SER/PLAS: 32.9 MG/DL
CHOLESTEROL/HDL RATIO: 6.2
CREATININE (MG/DL) IN SER/PLAS: 1.1 MG/DL (ref 0.5–1.3)
EOSINOPHILS (10*3/UL) IN BLOOD BY AUTOMATED COUNT: 0.14 X10E9/L (ref 0–0.7)
EOSINOPHILS/100 LEUKOCYTES IN BLOOD BY AUTOMATED COUNT: 2.6 % (ref 0–6)
ERYTHROCYTE DISTRIBUTION WIDTH (RATIO) BY AUTOMATED COUNT: 12.7 % (ref 11.5–14.5)
ERYTHROCYTE MEAN CORPUSCULAR HEMOGLOBIN CONCENTRATION (G/DL) BY AUTOMATED: 33 G/DL (ref 32–36)
ERYTHROCYTE MEAN CORPUSCULAR VOLUME (FL) BY AUTOMATED COUNT: 88 FL (ref 80–100)
ERYTHROCYTES (10*6/UL) IN BLOOD BY AUTOMATED COUNT: 5.55 X10E12/L (ref 4.5–5.9)
ESTIMATED AVERAGE GLUCOSE FOR HBA1C: 143 MG/DL
GFR MALE: 79 ML/MIN/1.73M2
GLUCOSE (MG/DL) IN SER/PLAS: 124 MG/DL (ref 74–99)
HEMATOCRIT (%) IN BLOOD BY AUTOMATED COUNT: 49.1 % (ref 41–52)
HEMOGLOBIN (G/DL) IN BLOOD: 16.2 G/DL (ref 13.5–17.5)
HEMOGLOBIN A1C/HEMOGLOBIN TOTAL IN BLOOD: 6.6 %
IMMATURE GRANULOCYTES/100 LEUKOCYTES IN BLOOD BY AUTOMATED COUNT: 0.4 % (ref 0–0.9)
LDL: 101 MG/DL (ref 0–99)
LEUKOCYTES (10*3/UL) IN BLOOD BY AUTOMATED COUNT: 5.3 X10E9/L (ref 4.4–11.3)
LYMPHOCYTES (10*3/UL) IN BLOOD BY AUTOMATED COUNT: 1.95 X10E9/L (ref 1.2–4.8)
LYMPHOCYTES/100 LEUKOCYTES IN BLOOD BY AUTOMATED COUNT: 36.7 % (ref 13–44)
MONOCYTES (10*3/UL) IN BLOOD BY AUTOMATED COUNT: 0.4 X10E9/L (ref 0.1–1)
MONOCYTES/100 LEUKOCYTES IN BLOOD BY AUTOMATED COUNT: 7.5 % (ref 2–10)
NEUTROPHILS (10*3/UL) IN BLOOD BY AUTOMATED COUNT: 2.77 X10E9/L (ref 1.2–7.7)
NEUTROPHILS/100 LEUKOCYTES IN BLOOD BY AUTOMATED COUNT: 52 % (ref 40–80)
NON HDL CHOLESTEROL: 171 MG/DL
PLATELETS (10*3/UL) IN BLOOD AUTOMATED COUNT: 201 X10E9/L (ref 150–450)
POTASSIUM (MMOL/L) IN SER/PLAS: 4.4 MMOL/L (ref 3.5–5.3)
PROSTATE SPECIFIC ANTIGEN,SCREEN: 0.3 NG/ML (ref 0–4)
PROTEIN TOTAL: 6.8 G/DL (ref 6.4–8.2)
SODIUM (MMOL/L) IN SER/PLAS: 142 MMOL/L (ref 136–145)
TRIGLYCERIDE (MG/DL) IN SER/PLAS: 349 MG/DL (ref 0–149)
UREA NITROGEN (MG/DL) IN SER/PLAS: 19 MG/DL (ref 6–23)
VLDL: 70 MG/DL (ref 0–40)

## 2023-07-26 PROCEDURE — 80061 LIPID PANEL: CPT

## 2023-07-26 PROCEDURE — 85025 COMPLETE CBC W/AUTO DIFF WBC: CPT

## 2023-07-26 PROCEDURE — 80053 COMPREHEN METABOLIC PANEL: CPT

## 2023-07-26 PROCEDURE — 36415 COLL VENOUS BLD VENIPUNCTURE: CPT

## 2023-07-26 PROCEDURE — 83036 HEMOGLOBIN GLYCOSYLATED A1C: CPT

## 2023-07-26 PROCEDURE — 84153 ASSAY OF PSA TOTAL: CPT

## 2023-07-27 ENCOUNTER — OFFICE VISIT (OUTPATIENT)
Dept: PRIMARY CARE | Facility: CLINIC | Age: 58
End: 2023-07-27
Payer: COMMERCIAL

## 2023-07-27 VITALS
SYSTOLIC BLOOD PRESSURE: 100 MMHG | OXYGEN SATURATION: 92 % | WEIGHT: 242.6 LBS | HEIGHT: 70 IN | TEMPERATURE: 97.6 F | BODY MASS INDEX: 34.73 KG/M2 | HEART RATE: 85 BPM | DIASTOLIC BLOOD PRESSURE: 60 MMHG

## 2023-07-27 DIAGNOSIS — E78.2 MIXED HYPERLIPIDEMIA: ICD-10-CM

## 2023-07-27 DIAGNOSIS — F33.1 MODERATE EPISODE OF RECURRENT MAJOR DEPRESSIVE DISORDER (MULTI): ICD-10-CM

## 2023-07-27 DIAGNOSIS — E11.9 TYPE 2 DIABETES MELLITUS WITHOUT COMPLICATION, WITH LONG-TERM CURRENT USE OF INSULIN (MULTI): Primary | ICD-10-CM

## 2023-07-27 DIAGNOSIS — I73.9 INTERMITTENT CLAUDICATION (CMS-HCC): ICD-10-CM

## 2023-07-27 DIAGNOSIS — F41.1 GENERALIZED ANXIETY DISORDER: ICD-10-CM

## 2023-07-27 DIAGNOSIS — Z79.4 TYPE 2 DIABETES MELLITUS WITHOUT COMPLICATION, WITH LONG-TERM CURRENT USE OF INSULIN (MULTI): Primary | ICD-10-CM

## 2023-07-27 DIAGNOSIS — I10 ESSENTIAL HYPERTENSION: ICD-10-CM

## 2023-07-27 DIAGNOSIS — G47.33 OBSTRUCTIVE SLEEP APNEA: ICD-10-CM

## 2023-07-27 PROCEDURE — 3078F DIAST BP <80 MM HG: CPT | Performed by: FAMILY MEDICINE

## 2023-07-27 PROCEDURE — 3074F SYST BP LT 130 MM HG: CPT | Performed by: FAMILY MEDICINE

## 2023-07-27 PROCEDURE — 4010F ACE/ARB THERAPY RXD/TAKEN: CPT | Performed by: FAMILY MEDICINE

## 2023-07-27 PROCEDURE — 3044F HG A1C LEVEL LT 7.0%: CPT | Performed by: FAMILY MEDICINE

## 2023-07-27 PROCEDURE — 1036F TOBACCO NON-USER: CPT | Performed by: FAMILY MEDICINE

## 2023-07-27 PROCEDURE — 99214 OFFICE O/P EST MOD 30 MIN: CPT | Performed by: FAMILY MEDICINE

## 2023-07-27 RX ORDER — METOPROLOL SUCCINATE 25 MG/1
25 TABLET, EXTENDED RELEASE ORAL DAILY
Qty: 30 TABLET | Refills: 3 | Status: SHIPPED | OUTPATIENT
Start: 2023-07-27 | End: 2024-03-29

## 2023-07-27 RX ORDER — BLOOD-GLUCOSE TRANSMITTER
EACH MISCELLANEOUS
Qty: 1 EACH | Refills: 2 | Status: SHIPPED | OUTPATIENT
Start: 2023-07-27

## 2023-07-27 RX ORDER — LISINOPRIL 2.5 MG/1
2.5 TABLET ORAL DAILY
Qty: 30 TABLET | Refills: 3 | Status: SHIPPED | OUTPATIENT
Start: 2023-07-27 | End: 2023-12-27 | Stop reason: SINTOL

## 2023-07-27 RX ORDER — SEMAGLUTIDE 1.34 MG/ML
1 INJECTION, SOLUTION SUBCUTANEOUS
Qty: 2 EACH | Refills: 3 | Status: SHIPPED | OUTPATIENT
Start: 2023-07-27 | End: 2023-11-29 | Stop reason: WASHOUT

## 2023-07-27 ASSESSMENT — ANXIETY QUESTIONNAIRES
5. BEING SO RESTLESS THAT IT IS HARD TO SIT STILL: NEARLY EVERY DAY
2. NOT BEING ABLE TO STOP OR CONTROL WORRYING: NEARLY EVERY DAY
1. FEELING NERVOUS, ANXIOUS, OR ON EDGE: NEARLY EVERY DAY
3. WORRYING TOO MUCH ABOUT DIFFERENT THINGS: NEARLY EVERY DAY
6. BECOMING EASILY ANNOYED OR IRRITABLE: NOT AT ALL
4. TROUBLE RELAXING: NEARLY EVERY DAY
IF YOU CHECKED OFF ANY PROBLEMS ON THIS QUESTIONNAIRE, HOW DIFFICULT HAVE THESE PROBLEMS MADE IT FOR YOU TO DO YOUR WORK, TAKE CARE OF THINGS AT HOME, OR GET ALONG WITH OTHER PEOPLE: NOT DIFFICULT AT ALL
7. FEELING AFRAID AS IF SOMETHING AWFUL MIGHT HAPPEN: NOT AT ALL
GAD7 TOTAL SCORE: 15

## 2023-07-27 ASSESSMENT — PATIENT HEALTH QUESTIONNAIRE - PHQ9
1. LITTLE INTEREST OR PLEASURE IN DOING THINGS: NOT AT ALL
2. FEELING DOWN, DEPRESSED OR HOPELESS: NOT AT ALL
SUM OF ALL RESPONSES TO PHQ9 QUESTIONS 1 AND 2: 0

## 2023-07-27 NOTE — PROGRESS NOTES
Chief Complaint   Patient presents with    Follow-up     Diabetes, Hypertension, Hyperlipidemia, other chronic medical conditions and labs     He presents today for follow up on Diabetes Mellitus, hypertension, hyperlipidemia . Current  diabetes related symptoms include: none. Patient denies foot ulcerations, hypoglycemia , nausea, paresthesia of the feet, polydipsia, polyuria, visual disturbances, vomiting, and weight loss.  Patient denies chest pain, claudication, dyspnea, exertional chest pressure/discomfort, irregular heart beat, lower extremity edema, orthopnea, palpitations, paroxysmal nocturnal dyspnea, and syncope. Evaluation to date has included: fasting blood sugar, fasting lipid panel, hemoglobin A1C, and microalbuminuria.  Home sugars: BGs are running  consistent with Hgb A1C.   Patient denies chest pain, claudication, dyspnea, exertional chest pressure/discomfort, irregular heart beat, lower extremity edema, orthopnea, palpitations, paroxysmal nocturnal dyspnea, and syncope.     Patients wants to discuss referral for home sleep study for CPAP supplies.     The patient presents for evaluation of obstructive sleep apnea. Symptoms include: excessive daytime sleepiness, snoring, non refreshing sleep and sleepy when sedentary. Witnessed apneas during sleep and witnessed gasping during sleep, no impaired concentration, no memory problems, no irritability, and no restless legs.  He complaints of excessive daytime somnolence and falling asleep unduly.    Past Medical History:   Diagnosis Date    Other specified health status     Non-smoker     Patient Active Problem List    Diagnosis Date Noted    Diarrhea 05/18/2023    Healthcare maintenance 04/27/2023    Type 2 diabetes mellitus without complication, with long-term current use of insulin (CMS/[facility]) 04/27/2023    Acute recurrent maxillary sinusitis 03/30/2023    Atherosclerosis of coronary artery 03/29/2023    Carbuncle 03/29/2023    Carbuncle of right axilla  Please fax my order for retroperitoneal ultrasound to the number there.     Thanks,    MA 03/29/2023    Cellulitis of right axilla 03/29/2023    Chronic insomnia 03/29/2023    Chronic bilateral low back pain with left-sided sciatica 03/29/2023    Chronic neck pain 03/29/2023    Contusion of lower back 03/29/2023    Disease due to severe acute respiratory syndrome coronavirus 2 (SARS-CoV-2) 03/29/2023    Adhesive capsulitis of left shoulder 03/29/2023    Dupuytren's contracture of both hands 03/29/2023    Essential hypertension 03/29/2023    Generalized anxiety disorder 03/29/2023    Intermittent claudication (CMS/HCC) 03/29/2023    Back pain 03/29/2023    Left hip pain 03/29/2023    Mixed hyperlipidemia 03/29/2023    Moderate episode of recurrent major depressive disorder (CMS/HCC) 03/29/2023    Muscle strain 03/29/2023    Nasal congestion with rhinorrhea 03/29/2023    Nasopharyngitis 03/29/2023    Perianal abscess 03/29/2023    Persistent cough 03/29/2023    Osteoarthritis 03/29/2023    Polyarthritis 03/29/2023    Primary localized osteoarthritis of left hip 03/29/2023    Rotator cuff injury, left, initial encounter 03/29/2023    Shift work sleep disorder 03/29/2023    Shortness of breath on exertion 03/29/2023    Shoulder pain 03/29/2023    Strain of lumbar region 03/29/2023    Viral gastroenteritis 03/29/2023     Past Surgical History:   Procedure Laterality Date    OTHER SURGICAL HISTORY  05/20/2019    Rotator cuff repair    OTHER SURGICAL HISTORY  05/20/2019    Knee surgery    OTHER SURGICAL HISTORY  11/17/2019    Colonoscopy    OTHER SURGICAL HISTORY  09/26/2021    Cardiac catheterization with stent placement    OTHER SURGICAL HISTORY  10/19/2022    Shoulder surgery     No family history on file.    Social History     Socioeconomic History    Marital status: Single     Spouse name: None    Number of children: None    Years of education: None    Highest education level: None   Occupational History    None   Tobacco Use    Smoking status: Never     Passive exposure: Never    Smokeless tobacco: Never  "  Vaping Use    Vaping Use: Never used   Substance and Sexual Activity    Alcohol use: Never    Drug use: Never    Sexual activity: Defer   Other Topics Concern    None   Social History Narrative    None     Social Determinants of Health     Financial Resource Strain: Not on file   Food Insecurity: Not on file   Transportation Needs: Not on file   Physical Activity: Not on file   Stress: Not on file   Social Connections: Not on file   Intimate Partner Violence: Not on file   Housing Stability: Not on file     Current Outpatient Medications   Medication Sig Dispense Refill    aspirin 81 mg chewable tablet Chew 1 tablet (81 mg) once daily.      atorvastatin (Lipitor) 80 mg tablet Take 1 tablet (80 mg) by mouth once daily at bedtime.      Basaglar KwikPen U-100 Insulin 100 unit/mL (3 mL) pen Inject 75 Units under the skin once daily in the morning.      BD Marly 2nd Gen Pen Needle 32 gauge x 5/32\" needle USE 1 DAILY AS DIRECTED      blood-glucose sensor (Dexcom G6 Sensor) device USE TO CHECK BLOOD SUGAR 4 TIMES DAILY 3 each 3    Dexcom G4 platinum  (Dexcom G6 ) misc USE TO CHECK BLOOD SUGAR 4 TIMES DAILY 1 each 0    escitalopram (Lexapro) 20 mg tablet Take 1 tablet (20 mg) by mouth once daily.      Farxiga 10 mg TAKE ONE TABLET BY MOUTH EVERY DAY IN THE MORNING 30 tablet 3    fenofibrate (Triglide) 160 mg tablet Take 1 tablet (160 mg) by mouth once daily.      fluticasone (Flonase) 50 mcg/actuation nasal spray Administer 2 sprays into each nostril once daily. 16 g 3    icosapent ethyL (Vascepa) 1 gram capsule Take 2 capsules (2 g) by mouth 2 times a day.      insulin lispro (HumaLOG U-100 Insulin) 100 unit/mL injection FILL V-GO PATCH WITH 100 UNITS DAILY AS DIRECTED 30 mL 3    nitroglycerin (Nitrostat) 0.4 mg SL tablet Place 1 tablet (0.4 mg) under the tongue.      omeprazole (PriLOSEC) 20 mg DR capsule Take 1 capsule (20 mg) by mouth.      sub-q insulin device, 40 unit (V-GO 40) device USE DAILY AS " "DIRECTED 90 kit 1    traZODone (Desyrel) 50 mg tablet Take 1 tablet (50 mg) by mouth once daily at bedtime.      cyclobenzaprine (Flexeril) 10 mg tablet Take 1 tablet (10 mg) by mouth once daily at bedtime for 10 days. (Patient not taking: Reported on 7/27/2023) 10 tablet 0    Dexcom G4 platinum transmitter (Dexcom G6 Transmitter) device USE TO CHECK BLOOD SUGAR 4 TIMES DAILY 1 each 2    diclofenac (Voltaren) 50 mg EC tablet Take 1 tablet (50 mg) by mouth every 8 hours if needed.      lisinopril 2.5 mg tablet Take 1 tablet (2.5 mg) by mouth once daily. 30 tablet 3    methocarbamol (Robaxin) 500 mg tablet Take 1 tablet (500 mg) by mouth every 8 hours.      metoprolol succinate XL (Toprol-XL) 25 mg 24 hr tablet Take 1 tablet (25 mg) by mouth once daily. Do not crush or chew. 30 tablet 3    semaglutide (Ozempic) 1 mg/dose (2 mg/1.5 mL) pen injector Inject 1 mg under the skin 1 (one) time per week. 2 each 3     No current facility-administered medications for this visit.     Current Outpatient Medications on File Prior to Visit   Medication Sig Dispense Refill    aspirin 81 mg chewable tablet Chew 1 tablet (81 mg) once daily.      atorvastatin (Lipitor) 80 mg tablet Take 1 tablet (80 mg) by mouth once daily at bedtime.      Basaglar KwikPen U-100 Insulin 100 unit/mL (3 mL) pen Inject 75 Units under the skin once daily in the morning.      BD Marly 2nd Gen Pen Needle 32 gauge x 5/32\" needle USE 1 DAILY AS DIRECTED      blood-glucose sensor (Dexcom G6 Sensor) device USE TO CHECK BLOOD SUGAR 4 TIMES DAILY 3 each 3    Dexcom G4 platinum  (Dexcom G6 ) misc USE TO CHECK BLOOD SUGAR 4 TIMES DAILY 1 each 0    escitalopram (Lexapro) 20 mg tablet Take 1 tablet (20 mg) by mouth once daily.      Farxiga 10 mg TAKE ONE TABLET BY MOUTH EVERY DAY IN THE MORNING 30 tablet 3    fenofibrate (Triglide) 160 mg tablet Take 1 tablet (160 mg) by mouth once daily.      fluticasone (Flonase) 50 mcg/actuation nasal spray " Administer 2 sprays into each nostril once daily. 16 g 3    icosapent ethyL (Vascepa) 1 gram capsule Take 2 capsules (2 g) by mouth 2 times a day.      insulin lispro (HumaLOG U-100 Insulin) 100 unit/mL injection FILL V-GO PATCH WITH 100 UNITS DAILY AS DIRECTED 30 mL 3    nitroglycerin (Nitrostat) 0.4 mg SL tablet Place 1 tablet (0.4 mg) under the tongue.      omeprazole (PriLOSEC) 20 mg DR capsule Take 1 capsule (20 mg) by mouth.      sub-q insulin device, 40 unit (V-GO 40) device USE DAILY AS DIRECTED 90 kit 1    traZODone (Desyrel) 50 mg tablet Take 1 tablet (50 mg) by mouth once daily at bedtime.      [DISCONTINUED] Dexcom G4 platinum transmitter (Dexcom G6 Transmitter) device USE TO CHECK BLOOD SUGAR 4 TIMES DAILY 1 each 0    [DISCONTINUED] lisinopril 5 mg tablet Take 1 tablet (5 mg) by mouth once daily.      [DISCONTINUED] metoprolol tartrate (Lopressor) 25 mg tablet Take 1 tablet (25 mg) by mouth 2 times a day.      [DISCONTINUED] semaglutide (Ozempic) 0.25 mg or 0.5 mg(2 mg/1.5 mL) pen injector Inject 0.25 mg subcutaneous weekly x 4 week then increase to 0.5 mg weekly 1 each 2    cyclobenzaprine (Flexeril) 10 mg tablet Take 1 tablet (10 mg) by mouth once daily at bedtime for 10 days. (Patient not taking: Reported on 7/27/2023) 10 tablet 0    diclofenac (Voltaren) 50 mg EC tablet Take 1 tablet (50 mg) by mouth every 8 hours if needed.      methocarbamol (Robaxin) 500 mg tablet Take 1 tablet (500 mg) by mouth every 8 hours.      [DISCONTINUED] mupirocin (Bactroban) 2 % ointment Apply topically 3 times a day. apply to affected area (Patient not taking: Reported on 7/27/2023) 22 g 0    [DISCONTINUED] naproxen (Naprosyn) 500 mg tablet Take 1 tablet (500 mg) by mouth 2 times a day as needed for mild pain (1 - 3) or moderate pain (4 - 6) (pain). (Patient not taking: Reported on 7/27/2023) 60 tablet 0     No current facility-administered medications on file prior to visit.     Allergies   Allergen Reactions     "Tirzepatide Other          Review of Systems - General ROS: negative for - fatigue, fever, malaise, night sweats, sleep disturbance or weight loss  Psychological ROS: negative for - anxiety, concentration difficulties, depression, memory difficulties or sleep disturbances  ENT ROS: negative for - hearing change, nasal discharge, oral lesions, sinus pain, sore throat, tinnitus or vertigo  Allergy and Immunology ROS: negative for - hives, nasal congestion or seasonal allergies  Hematological and Lymphatic ROS: negative for - bruising, fatigue, night sweats or pallor  Endocrine ROS: negative for - hot flashes, malaise/lethargy, palpitations, polydipsia/polyuria, skin changes, temperature intolerance or unexpected weight changes  Respiratory ROS: negative for - cough, hemoptysis, pleuritic pain, shortness of breath or wheezing  Cardiovascular ROS: no chest pain or dyspnea on exertion  Gastrointestinal ROS: no abdominal pain, change in bowel habits, or black or bloody stools  Genito-Urinary ROS: no dysuria, trouble voiding, or hematuria  Musculoskeletal ROS: negative for - joint pain, joint stiffness, joint swelling, muscle pain or muscular weakness  Neurological ROS: negative for - dizziness, gait disturbance, headaches, impaired coordination/balance, numbness/tingling, tremors or visual changes  Dermatological ROS: negative for - dry skin, lumps, pruritus or rash      Blood pressure 100/60, pulse 85, temperature 36.4 °C (97.6 °F), height 1.778 m (5' 10\"), weight 110 kg (242 lb 9.6 oz), SpO2 92 %.    Physical Examination: General appearance - alert, well appearing, and in no distress  Mental status - alert, oriented to person, place, and time  Eyes - pupils equal and reactive, extraocular eye movements intact  Ears - bilateral TM's and external ear canals normal  Mouth - mucous membranes moist, pharynx normal without lesions  Neck - supple, no significant adenopathy  Lymphatics - no palpable lymphadenopathy, no " hepatosplenomegaly  Chest - clear to auscultation, no wheezes, rales or rhonchi, symmetric air entry  Heart - normal rate, regular rhythm, normal S1, S2, no murmurs, rubs, clicks or gallops  Abdomen - soft, nontender, nondistended, no masses or organomegaly  Neurological - alert, oriented, normal speech, no focal findings or movement disorder noted  Musculoskeletal - no joint tenderness, deformity or swelling  Extremities: peripheral pulses normal, no pedal edema, no clubbing or cyanosis.      Lab on 07/26/2023   Component Date Value Ref Range Status    WBC 07/26/2023 5.3  4.4 - 11.3 x10E9/L Final    RBC 07/26/2023 5.55  4.50 - 5.90 x10E12/L Final    Hemoglobin 07/26/2023 16.2  13.5 - 17.5 g/dL Final    Hematocrit 07/26/2023 49.1  41.0 - 52.0 % Final    MCV 07/26/2023 88  80 - 100 fL Final    MCHC 07/26/2023 33.0  32.0 - 36.0 g/dL Final    Platelets 07/26/2023 201  150 - 450 x10E9/L Final    RDW 07/26/2023 12.7  11.5 - 14.5 % Final    Neutrophils % 07/26/2023 52.0  40.0 - 80.0 % Final    Immature Granulocytes %, Automated 07/26/2023 0.4  0.0 - 0.9 % Final     Immature Granulocyte Count (IG) includes promyelocytes,    myelocytes and metamyelocytes but does not include bands.   Percent differential counts (%) should be interpreted in the   context of the absolute cell counts (cells/L).    Lymphocytes % 07/26/2023 36.7  13.0 - 44.0 % Final    Monocytes % 07/26/2023 7.5  2.0 - 10.0 % Final    Eosinophils % 07/26/2023 2.6  0.0 - 6.0 % Final    Basophils % 07/26/2023 0.8  0.0 - 2.0 % Final    Neutrophils Absolute 07/26/2023 2.77  1.20 - 7.70 x10E9/L Final    Lymphocytes Absolute 07/26/2023 1.95  1.20 - 4.80 x10E9/L Final    Monocytes Absolute 07/26/2023 0.40  0.10 - 1.00 x10E9/L Final    Eosinophils Absolute 07/26/2023 0.14  0.00 - 0.70 x10E9/L Final    Basophils Absolute 07/26/2023 0.04  0.00 - 0.10 x10E9/L Final    Glucose 07/26/2023 124 (H)  74 - 99 mg/dL Final    Sodium 07/26/2023 142  136 - 145 mmol/L Final     Potassium 07/26/2023 4.4  3.5 - 5.3 mmol/L Final    Chloride 07/26/2023 104  98 - 107 mmol/L Final    Bicarbonate 07/26/2023 29  21 - 32 mmol/L Final    Anion Gap 07/26/2023 13  10 - 20 mmol/L Final    Urea Nitrogen 07/26/2023 19  6 - 23 mg/dL Final    Creatinine 07/26/2023 1.10  0.50 - 1.30 mg/dL Final    GFR MALE 07/26/2023 79  >90 mL/min/1.73m2 Final     CALCULATIONS OF ESTIMATED GFR ARE PERFORMED   USING THE 2021 CKD-EPI STUDY REFIT EQUATION   WITHOUT THE RACE VARIABLE FOR THE IDMS-TRACEABLE   CREATININE METHODS.    https://jasn.asnjournals.org/content/early/2021/09/22/ASN.2933775597    Calcium 07/26/2023 9.5  8.6 - 10.3 mg/dL Final    Albumin 07/26/2023 4.5  3.4 - 5.0 g/dL Final    Alkaline Phosphatase 07/26/2023 57  33 - 120 U/L Final    Total Protein 07/26/2023 6.8  6.4 - 8.2 g/dL Final    AST 07/26/2023 17  9 - 39 U/L Final    Total Bilirubin 07/26/2023 0.7  0.0 - 1.2 mg/dL Final    ALT (SGPT) 07/26/2023 25  10 - 52 U/L Final     Patients treated with Sulfasalazine may generate    falsely decreased results for ALT.    Hemoglobin A1C 07/26/2023 6.6 (A)  % Final         Diagnosis of Diabetes-Adults   Non-Diabetic: < or = 5.6%   Increased risk for developing diabetes: 5.7-6.4%   Diagnostic of diabetes: > or = 6.5%  .       Monitoring of Diabetes                Age (y)     Therapeutic Goal (%)   Adults:          >18           <7.0   Pediatrics:    13-18           <7.5                   7-12           <8.0                   0- 6            7.5-8.5   American Diabetes Association. Diabetes Care 33(S1), Jan 2010.    Estimated Average Glucose 07/26/2023 143  MG/DL Final    Cholesterol 07/26/2023 204 (H)  0 - 199 mg/dL Final    .      AGE      DESIRABLE   BORDERLINE HIGH   HIGH     0-19 Y     0 - 169       170 - 199     >/= 200    20-24 Y     0 - 189       190 - 224     >/= 225         >24 Y     0 - 199       200 - 239     >/= 240   **All ranges are based on fasting samples. Specific   therapeutic targets will vary  based on patient-specific   cardiac risk.  .   Pediatric guidelines reference:Pediatrics 2011, 128(S5).   Adult guidelines reference: NCEP ATPIII Guidelines,     DAMIR 2001, 258:2486-97  .   Venipuncture immediately after or during the    administration of Metamizole may lead to falsely   low results. Testing should be performed immediately   prior to Metamizole dosing.    HDL 07/26/2023 32.9 (A)  mg/dL Final    .      AGE      VERY LOW   LOW     NORMAL    HIGH       0-19 Y       < 35   < 40     40-45     ----    20-24 Y       ----   < 40       >45     ----      >24 Y       ----   < 40     40-60      >60  .    Cholesterol/HDL Ratio 07/26/2023 6.2 (A)   Final    REF VALUES  DESIRABLE  < 3.4  HIGH RISK  > 5.0    LDL 07/26/2023 101 (H)  0 - 99 mg/dL Final    .                           NEAR      BORD      AGE      DESIRABLE  OPTIMAL    HIGH     HIGH     VERY HIGH     0-19 Y     0 - 109     ---    110-129   >/= 130     ----    20-24 Y     0 - 119     ---    120-159   >/= 160     ----      >24 Y     0 -  99   100-129  130-159   160-189     >/=190  .    VLDL 07/26/2023 70 (H)  0 - 40 mg/dL Final    Triglycerides 07/26/2023 349 (H)  0 - 149 mg/dL Final    .      AGE      DESIRABLE   BORDERLINE HIGH   HIGH     VERY HIGH   0 D-90 D    19 - 174         ----         ----        ----  91 D- 9 Y     0 -  74        75 -  99     >/= 100      ----    10-19 Y     0 -  89        90 - 129     >/= 130      ----    20-24 Y     0 - 114       115 - 149     >/= 150      ----         >24 Y     0 - 149       150 - 199    200- 499    >/= 500  .   Venipuncture immediately after or during the    administration of Metamizole may lead to falsely   low results. Testing should be performed immediately   prior to Metamizole dosing.    Non HDL Cholesterol 07/26/2023 171  mg/dL Final        AGE      DESIRABLE   BORDERLINE HIGH   HIGH     VERY HIGH     0-19 Y     0 - 119       120 - 144     >/= 145    >/= 160    20-24 Y     0 - 149       150 - 189      ">/= 190      ----         >24 Y    30 MG/DL ABOVE LDL CHOLESTEROL GOAL  .    Prostate Specific Antigen,Screen 07/26/2023 0.30  0.00 - 4.00 ng/mL Final    The FDA requires that the method used for PSA assay be   reported to the physician. Values obtained with different   assay methods must not be used interchangeably. This test  was performed at [facility] using the Access   Hybritech PSA assay is a two-site immunoenzymatic sandwich   assay. The assay is approved for measurement of   prostate-specific antigen (PSA)in serum and may be used   in conjunction with a digital rectal examination in men   50 years and older as an aid in detection of prostate   cancer.  5-Alpha-reductase inhibitors (e.g. Proscar, Finasteride,   Avodart, Dutasteride and Dayanna) for the treatment of BPH   have been shown to lower PSA levels by an average of 50%   after 6 months of treatment.       Problem List Items Addressed This Visit       Essential hypertension    Current Assessment & Plan     We will change the Metoprolol Tartrate to Toprol XL and decrease the Lisinopril to 2.5 mg daily.  Dietary sodium restriction.  Regular aerobic exercise program is recommended to help achieve and maintain normal body weight, fitness and improve lipid balance. .  Dietary changes: Increase soluble fiber  Plant sterols 2grams per day (e.g. Benecol)  Reduce saturated fat, \"trans\" monounsaturated fatty acids, and cholesterol           Relevant Medications    lisinopril 2.5 mg tablet    metoprolol succinate XL (Toprol-XL) 25 mg 24 hr tablet    Other Relevant Orders    Comprehensive Metabolic Panel    Hemoglobin A1C    Lipid Panel    Follow Up In [facility] - PCP - Established    Generalized anxiety disorder    Intermittent claudication (CMS/HCC)    Current Assessment & Plan     Chronic Condition Documentation : Stable based on symptoms and exam.  Continue established treatment plan and follow-up at least yearly.           Mixed " hyperlipidemia    Current Assessment & Plan     The nature of cardiac risk has been fully discussed with this patient. Discussed cardiovascular risk analysis and appropriate diet with the need for lifelong measures to reduce the risk. A regular exercise program is recommended to help achieve and maintain normal body weight, fitness and improve lipid balance. Patient education provided. They understand and agree with this course of treatment. They will return with new or worsening symptoms. Patient instructed to remain current with appropriate annual health maintenance.            Relevant Orders    Comprehensive Metabolic Panel    Hemoglobin A1C    Lipid Panel    Follow Up In [facility] - PCP - Established    Moderate episode of recurrent major depressive disorder (CMS/HCC)    Current Assessment & Plan     Chronic Condition Documentation : Stable based on symptoms and exam.  Continue established treatment plan and follow-up at least yearly.           Type 2 diabetes mellitus without complication, with long-term current use of insulin (CMS/[facility]) - Primary    Current Assessment & Plan     Diabetes Mellitus type II, under good control.  1. Rx changes: We will increase Ozempic to 1 mg weekly.  2. Education: Reviewed ‘ABCs’ of diabetes management (respective goals in parentheses):  A1C (<7), blood pressure (<130/80), and cholesterol (LDL <100).  3. Compliance at present is estimated to be good. Efforts to improve compliance (if necessary) will be directed at dietary modifications: and increased exercise.  4. Follow up: 3 months           Relevant Medications    Dexcom G4 platinum transmitter (Dexcom G6 Transmitter) device    semaglutide (Ozempic) 1 mg/dose (2 mg/1.5 mL) pen injector    Other Relevant Orders    Comprehensive Metabolic Panel    Hemoglobin A1C    Lipid Panel    Follow Up In [facility] - PCP - Established     Other Visit Diagnoses       Obstructive sleep apnea        Relevant Orders     Home sleep apnea test (HSAT)          Patient to keep food diary and log of blood sugars and bring to next office visit. Patient encouraged to increase activity level gradually and encouraged weight loss. Strongly encouraged to maintain blood sugar at levels as close to normal as possible thus preventing or delaying complications (regular medical care is important for this). Encouraged to follow a balanced meal plan. Eat consistent and moderate amounts of food at regular times. Nuts and peanut butter are a good choice for a snack. Advised patient to not skip meals. Recommended that patient: Eat plenty of vegetables and fiber, limited amounts of fat, moderate amounts of protein and low-fat dairy products, and carefully limit foods containing high concentrated sugar. Keep a record of your food intake. We will review at the next visit. Educated patient about exercise. Exercise lowers blood glucose levels. Regular exercise (eg, 30-60 minutes of walking every day) can help keep blood glucose in better control. Exercise increases insulin sensitivity and helps an individual lose weight. It can also help overall health.     Scribe Attestation  By signing my name below, I, Migel Mullins   attest that this documentation has been prepared under the direction and in the presence of Ez Maier MD.

## 2023-07-27 NOTE — ASSESSMENT & PLAN NOTE
"We will change the Metoprolol Tartrate to Toprol XL and decrease the Lisinopril to 2.5 mg daily.  Dietary sodium restriction.  Regular aerobic exercise program is recommended to help achieve and maintain normal body weight, fitness and improve lipid balance. .  Dietary changes: Increase soluble fiber  Plant sterols 2grams per day (e.g. Benecol)  Reduce saturated fat, \"trans\" monounsaturated fatty acids, and cholesterol    "

## 2023-07-27 NOTE — ASSESSMENT & PLAN NOTE
Diabetes Mellitus type II, under good control.  1. Rx changes: We will increase Ozempic to 1 mg weekly.  2. Education: Reviewed ‘ABCs’ of diabetes management (respective goals in parentheses):  A1C (<7), blood pressure (<130/80), and cholesterol (LDL <100).  3. Compliance at present is estimated to be good. Efforts to improve compliance (if necessary) will be directed at dietary modifications: and increased exercise.  4. Follow up: 3 months

## 2023-08-17 DIAGNOSIS — G47.33 OBSTRUCTIVE SLEEP APNEA: ICD-10-CM

## 2023-08-18 DIAGNOSIS — Z79.4 TYPE 2 DIABETES MELLITUS WITHOUT COMPLICATION, WITH LONG-TERM CURRENT USE OF INSULIN (MULTI): ICD-10-CM

## 2023-08-18 DIAGNOSIS — E11.9 TYPE 2 DIABETES MELLITUS WITHOUT COMPLICATION, WITH LONG-TERM CURRENT USE OF INSULIN (MULTI): ICD-10-CM

## 2023-08-18 RX ORDER — PEN NEEDLE, DIABETIC 32GX 5/32"
NEEDLE, DISPOSABLE MISCELLANEOUS
Qty: 100 EACH | Refills: 3 | Status: SHIPPED | OUTPATIENT
Start: 2023-08-18

## 2023-08-18 NOTE — TELEPHONE ENCOUNTER
Recent Visits  Date Type Provider Dept   07/27/23 Office Visit Ez Maier MD Do [id number] [facility]   07/07/23 Office Visit Kelly J Slavik-Bosworth, APRN-CNP Do [id number] [facility]   05/30/23 Office Visit Ez Maier MD Do [id number] [facility]   05/09/23 Office Visit Kelly J Slavik-Bosworth, APRN-CNP Do [id number] [facility]   04/27/23 Office Visit Ez Maier MD Do [id number] [facility]   03/30/23 Office Visit Ez Maier MD Do [id number] [facility]   03/13/23 Office Visit Wilbur Aviles, SHAGGY-CNP Do [id number] [facility]   Showing recent visits within past 540 days and meeting all other requirements  Future Appointments  Date Type Provider Dept   08/22/23 Appointment Ez Maier MD Do [id number] [facility]   10/26/23 Appointment Ez Maier MD Do [id number] [facility]   Showing future appointments within next 180 days and meeting all other requirements

## 2023-08-22 ENCOUNTER — OFFICE VISIT (OUTPATIENT)
Dept: PRIMARY CARE | Facility: CLINIC | Age: 58
End: 2023-08-22
Payer: COMMERCIAL

## 2023-08-22 VITALS
DIASTOLIC BLOOD PRESSURE: 66 MMHG | SYSTOLIC BLOOD PRESSURE: 104 MMHG | RESPIRATION RATE: 16 BRPM | WEIGHT: 238 LBS | HEIGHT: 70 IN | OXYGEN SATURATION: 97 % | HEART RATE: 88 BPM | TEMPERATURE: 97.4 F | BODY MASS INDEX: 34.07 KG/M2

## 2023-08-22 DIAGNOSIS — E11.9 TYPE 2 DIABETES MELLITUS WITHOUT COMPLICATION, WITH LONG-TERM CURRENT USE OF INSULIN (MULTI): ICD-10-CM

## 2023-08-22 DIAGNOSIS — Z79.4 TYPE 2 DIABETES MELLITUS WITHOUT COMPLICATION, WITH LONG-TERM CURRENT USE OF INSULIN (MULTI): ICD-10-CM

## 2023-08-22 DIAGNOSIS — G47.33 OBSTRUCTIVE SLEEP APNEA: ICD-10-CM

## 2023-08-22 PROCEDURE — 4010F ACE/ARB THERAPY RXD/TAKEN: CPT | Performed by: FAMILY MEDICINE

## 2023-08-22 PROCEDURE — 3074F SYST BP LT 130 MM HG: CPT | Performed by: FAMILY MEDICINE

## 2023-08-22 PROCEDURE — 3044F HG A1C LEVEL LT 7.0%: CPT | Performed by: FAMILY MEDICINE

## 2023-08-22 PROCEDURE — 3078F DIAST BP <80 MM HG: CPT | Performed by: FAMILY MEDICINE

## 2023-08-22 PROCEDURE — 99213 OFFICE O/P EST LOW 20 MIN: CPT | Performed by: FAMILY MEDICINE

## 2023-08-22 PROCEDURE — 1036F TOBACCO NON-USER: CPT | Performed by: FAMILY MEDICINE

## 2023-08-22 RX ORDER — BLOOD-GLUCOSE SENSOR
EACH MISCELLANEOUS
Qty: 3 EACH | Refills: 6 | Status: SHIPPED | OUTPATIENT
Start: 2023-08-22 | End: 2024-05-06

## 2023-08-22 RX ORDER — BLOOD-GLUCOSE,RECEIVER,CONT
EACH MISCELLANEOUS
Qty: 1 EACH | Refills: 0 | Status: CANCELLED | OUTPATIENT
Start: 2023-08-22

## 2023-08-22 RX ORDER — BLOOD-GLUCOSE TRANSMITTER
EACH MISCELLANEOUS
Qty: 1 EACH | Refills: 2 | Status: CANCELLED | OUTPATIENT
Start: 2023-08-22

## 2023-08-22 ASSESSMENT — PATIENT HEALTH QUESTIONNAIRE - PHQ9
SUM OF ALL RESPONSES TO PHQ9 QUESTIONS 1 AND 2: 0
1. LITTLE INTEREST OR PLEASURE IN DOING THINGS: NOT AT ALL
2. FEELING DOWN, DEPRESSED OR HOPELESS: NOT AT ALL

## 2023-08-22 NOTE — ASSESSMENT & PLAN NOTE
We will submit the order for AutoPAP machine to the [facility]. Patient was advised to contact out office if he does not here from them within the next 2 weeks.

## 2023-08-22 NOTE — PROGRESS NOTES
Chief Complaint   Patient presents with    Follow-up     On Sleep Apnea and Sleep study results     He presents today for follow up on sleep apnea. He complains of snoring, periods of not breathing, excessive daytime sleepiness, feels sleepy during the day, and unrefreshing sleep .  Symptoms began  many  years ago, gradually worsening since that time. Previous evaluation and treatment has included  home sleep study done 8/11/2023 .    Past Medical History:   Diagnosis Date    Other specified health status     Non-smoker     Patient Active Problem List    Diagnosis Date Noted    Obstructive sleep apnea 08/22/2023    Diarrhea 05/18/2023    Healthcare maintenance 04/27/2023    Type 2 diabetes mellitus without complication, with long-term current use of insulin (CMS/[facility]) 04/27/2023    Acute recurrent maxillary sinusitis 03/30/2023    Atherosclerosis of coronary artery 03/29/2023    Carbuncle 03/29/2023    Carbuncle of right axilla 03/29/2023    Cellulitis of right axilla 03/29/2023    Chronic insomnia 03/29/2023    Chronic bilateral low back pain with left-sided sciatica 03/29/2023    Chronic neck pain 03/29/2023    Contusion of lower back 03/29/2023    Disease due to severe acute respiratory syndrome coronavirus 2 (SARS-CoV-2) 03/29/2023    Adhesive capsulitis of left shoulder 03/29/2023    Dupuytren's contracture of both hands 03/29/2023    Essential hypertension 03/29/2023    Generalized anxiety disorder 03/29/2023    Intermittent claudication (CMS/[facility]) 03/29/2023    Back pain 03/29/2023    Left hip pain 03/29/2023    Mixed hyperlipidemia 03/29/2023    Moderate episode of recurrent major depressive disorder (CMS/HCC) 03/29/2023    Muscle strain 03/29/2023    Nasal congestion with rhinorrhea 03/29/2023    Nasopharyngitis 03/29/2023    Perianal abscess 03/29/2023    Persistent cough 03/29/2023    Osteoarthritis 03/29/2023    Polyarthritis 03/29/2023    Primary localized osteoarthritis of left hip 03/29/2023    Rotator  "cuff injury, left, initial encounter 03/29/2023    Shift work sleep disorder 03/29/2023    Shortness of breath on exertion 03/29/2023    Shoulder pain 03/29/2023    Strain of lumbar region 03/29/2023    Viral gastroenteritis 03/29/2023     Past Surgical History:   Procedure Laterality Date    OTHER SURGICAL HISTORY  05/20/2019    Rotator cuff repair    OTHER SURGICAL HISTORY  05/20/2019    Knee surgery    OTHER SURGICAL HISTORY  11/17/2019    Colonoscopy    OTHER SURGICAL HISTORY  09/26/2021    Cardiac catheterization with stent placement    OTHER SURGICAL HISTORY  10/19/2022    Shoulder surgery       Social History     Socioeconomic History    Marital status: Single     Spouse name: None    Number of children: None    Years of education: None    Highest education level: None   Occupational History    None   Tobacco Use    Smoking status: Never     Passive exposure: Never    Smokeless tobacco: Never   Vaping Use    Vaping Use: Never used   Substance and Sexual Activity    Alcohol use: Never    Drug use: Never    Sexual activity: Defer   Other Topics Concern    None   Social History Narrative    None     Social Determinants of Health     Financial Resource Strain: Not on file   Food Insecurity: Not on file   Transportation Needs: Not on file   Physical Activity: Not on file   Stress: Not on file   Social Connections: Not on file   Intimate Partner Violence: Not on file   Housing Stability: Not on file     Current Outpatient Medications   Medication Sig Dispense Refill    aspirin 81 mg chewable tablet Chew 1 tablet (81 mg) once daily.      atorvastatin (Lipitor) 80 mg tablet Take 1 tablet (80 mg) by mouth once daily at bedtime.      BD Marly 2nd Gen Pen Needle 32 gauge x 5/32\" needle USE 1 DAILY AS DIRECTED 100 each 3    blood-glucose sensor (Dexcom G6 Sensor) device USE TO CHECK BLOOD SUGAR FOUR TIMES DAILY 3 each 6    Dexcom G4 platinum  (Dexcom G6 ) misc USE TO CHECK BLOOD SUGAR 4 TIMES DAILY 1 each " 0    Dexcom G4 platinum transmitter (Dexcom G6 Transmitter) device USE TO CHECK BLOOD SUGAR 4 TIMES DAILY 1 each 2    diclofenac (Voltaren) 50 mg EC tablet Take 1 tablet (50 mg) by mouth every 8 hours if needed.      escitalopram (Lexapro) 20 mg tablet Take 1 tablet (20 mg) by mouth once daily.      Farxiga 10 mg TAKE ONE TABLET BY MOUTH EVERY DAY IN THE MORNING 30 tablet 3    fenofibrate (Triglide) 160 mg tablet Take 1 tablet (160 mg) by mouth once daily.      fluticasone (Flonase) 50 mcg/actuation nasal spray Administer 2 sprays into each nostril once daily. 16 g 3    icosapent ethyL (Vascepa) 1 gram capsule Take 2 capsules (2 g) by mouth 2 times a day.      insulin glargine (Basaglar KwikPen U-100 Insulin) 100 unit/mL (3 mL) pen INJECT 70 UNITS SUBCUTANEOUSLY EVERY MORNING 30 mL 3    insulin lispro (HumaLOG U-100 Insulin) 100 unit/mL injection fill v-go patch with 100 units daily as directed 30 mL 3    lisinopril 2.5 mg tablet Take 1 tablet (2.5 mg) by mouth once daily. 30 tablet 3    methocarbamol (Robaxin) 500 mg tablet Take 1 tablet (500 mg) by mouth every 8 hours.      metoprolol succinate XL (Toprol-XL) 25 mg 24 hr tablet Take 1 tablet (25 mg) by mouth once daily. Do not crush or chew. 30 tablet 3    nitroglycerin (Nitrostat) 0.4 mg SL tablet Place 1 tablet (0.4 mg) under the tongue.      omeprazole (PriLOSEC) 20 mg DR capsule Take 1 capsule (20 mg) by mouth.      semaglutide (Ozempic) 1 mg/dose (2 mg/1.5 mL) pen injector Inject 1 mg under the skin 1 (one) time per week. 2 each 3    sub-q insulin device, 40 unit (V-GO 40) device USE DAILY AS DIRECTED 90 kit 1    traZODone (Desyrel) 50 mg tablet Take 1 tablet (50 mg) by mouth once daily at bedtime.       No current facility-administered medications for this visit.     Current Outpatient Medications on File Prior to Visit   Medication Sig Dispense Refill    aspirin 81 mg chewable tablet Chew 1 tablet (81 mg) once daily.      atorvastatin (Lipitor) 80 mg tablet  "Take 1 tablet (80 mg) by mouth once daily at bedtime.      BD Marly 2nd Gen Pen Needle 32 gauge x 5/32\" needle USE 1 DAILY AS DIRECTED 100 each 3    Dexcom G4 platinum  (Dexcom G6 ) misc USE TO CHECK BLOOD SUGAR 4 TIMES DAILY 1 each 0    Dexcom G4 platinum transmitter (Dexcom G6 Transmitter) device USE TO CHECK BLOOD SUGAR 4 TIMES DAILY 1 each 2    diclofenac (Voltaren) 50 mg EC tablet Take 1 tablet (50 mg) by mouth every 8 hours if needed.      escitalopram (Lexapro) 20 mg tablet Take 1 tablet (20 mg) by mouth once daily.      Farxiga 10 mg TAKE ONE TABLET BY MOUTH EVERY DAY IN THE MORNING 30 tablet 3    fenofibrate (Triglide) 160 mg tablet Take 1 tablet (160 mg) by mouth once daily.      fluticasone (Flonase) 50 mcg/actuation nasal spray Administer 2 sprays into each nostril once daily. 16 g 3    icosapent ethyL (Vascepa) 1 gram capsule Take 2 capsules (2 g) by mouth 2 times a day.      insulin glargine (Basaglar KwikPen U-100 Insulin) 100 unit/mL (3 mL) pen INJECT 70 UNITS SUBCUTANEOUSLY EVERY MORNING 30 mL 3    insulin lispro (HumaLOG U-100 Insulin) 100 unit/mL injection fill v-go patch with 100 units daily as directed 30 mL 3    lisinopril 2.5 mg tablet Take 1 tablet (2.5 mg) by mouth once daily. 30 tablet 3    methocarbamol (Robaxin) 500 mg tablet Take 1 tablet (500 mg) by mouth every 8 hours.      metoprolol succinate XL (Toprol-XL) 25 mg 24 hr tablet Take 1 tablet (25 mg) by mouth once daily. Do not crush or chew. 30 tablet 3    nitroglycerin (Nitrostat) 0.4 mg SL tablet Place 1 tablet (0.4 mg) under the tongue.      omeprazole (PriLOSEC) 20 mg DR capsule Take 1 capsule (20 mg) by mouth.      semaglutide (Ozempic) 1 mg/dose (2 mg/1.5 mL) pen injector Inject 1 mg under the skin 1 (one) time per week. 2 each 3    sub-q insulin device, 40 unit (V-GO 40) device USE DAILY AS DIRECTED 90 kit 1    traZODone (Desyrel) 50 mg tablet Take 1 tablet (50 mg) by mouth once daily at bedtime.      " "[DISCONTINUED] Basaglar KwikPen U-100 Insulin 100 unit/mL (3 mL) pen Inject 75 Units under the skin once daily in the morning.      [DISCONTINUED] BD Marly 2nd Gen Pen Needle 32 gauge x 5/32\" needle USE 1 DAILY AS DIRECTED      [DISCONTINUED] blood-glucose sensor (Dexcom G6 Sensor) device USE TO CHECK BLOOD SUGAR 4 TIMES DAILY 3 each 3    [DISCONTINUED] cyclobenzaprine (Flexeril) 10 mg tablet Take 1 tablet (10 mg) by mouth once daily at bedtime for 10 days. (Patient not taking: Reported on 7/27/2023) 10 tablet 0    [DISCONTINUED] insulin lispro (HumaLOG U-100 Insulin) 100 unit/mL injection FILL V-GO PATCH WITH 100 UNITS DAILY AS DIRECTED 30 mL 3     No current facility-administered medications on file prior to visit.     Allergies   Allergen Reactions    Tirzepatide Other       Review of Systems - General ROS: negative for - fatigue, fever, malaise, night sweats or weight loss  ENT ROS: negative for - hearing change, nasal discharge, oral lesions, sinus pain, sore throat, tinnitus or vertigo  Allergy and Immunology ROS: negative for - hives, nasal congestion or seasonal allergies  Respiratory ROS: negative for - cough, hemoptysis, pleuritic pain, shortness of breath or wheezing  Cardiovascular ROS: no chest pain or dyspnea on exertion, palpitations, PND or orthopnea.  No syncope.  Gastrointestinal ROS: no abdominal pain, change in bowel habits, or black or bloody stools  Genito-Urinary ROS: no dysuria, trouble voiding, or hematuria  Dermatological ROS: negative for - dry skin, lumps, pruritus or rash  Musculoskeletal ROS: negative for - joint pain, joint stiffness, joint swelling, muscle pain or muscular weakness  Neurological ROS: negative for - dizziness, gait disturbance, headaches, impaired coordination/balance, numbness/tingling, tremors or visual changes  Psychological ROS: negative for - anxiety, concentration difficulties, depression, memory difficulties or sleep disturbances  Endocrine ROS: negative for - " "hot flashes, malaise/lethargy, palpitations, polydipsia/polyuria, skin changes, temperature intolerance   Hematological and Lymphatic ROS: negative for - bruising, night sweats or pallor    Blood pressure 104/66, pulse 88, temperature 36.3 °C (97.4 °F), resp. rate 16, height 1.778 m (5' 10\"), weight 108 kg (238 lb), SpO2 97 %.    Physical Examination: General appearance - alert, well appearing, and in no distress  HEENT EOMI, PEERLA, normal eyelids.  Oropharynx no erythema or exudate.  Normal tonsils.    Ears -external auditory canal normal bilaterally.  Tympanic membranes normal bilaterally.  Mouth - mucous membranes moist, pharynx normal without lesions  Neck - supple, trachea central no thyromegaly.  No significant adenopathy  Chest -good air entry bilaterally, no rhonchi rales and no wheezes.  Heart -normal S1 and S2, regular rate and rhythm with no murmurs gallop or rub.  Abdomen -nondistended, soft, nontender with no guarding, no palpable mass and no CVA tenderness.  Bowel sounds normal.  No hernia.  Neurological - alert, oriented, cranial nerves II through XII normal.  Reflexes 2+ bilaterally.  No focal deficit.  Musculoskeletal - no joint tenderness, deformity or swelling  Extremities: peripheral pulses normal, no clubbing or cyanosis.    Lab on 07/26/2023   Component Date Value Ref Range Status    WBC 07/26/2023 5.3  4.4 - 11.3 x10E9/L Final    RBC 07/26/2023 5.55  4.50 - 5.90 x10E12/L Final    Hemoglobin 07/26/2023 16.2  13.5 - 17.5 g/dL Final    Hematocrit 07/26/2023 49.1  41.0 - 52.0 % Final    MCV 07/26/2023 88  80 - 100 fL Final    MCHC 07/26/2023 33.0  32.0 - 36.0 g/dL Final    Platelets 07/26/2023 201  150 - 450 x10E9/L Final    RDW 07/26/2023 12.7  11.5 - 14.5 % Final    Neutrophils % 07/26/2023 52.0  40.0 - 80.0 % Final    Immature Granulocytes %, Automated 07/26/2023 0.4  0.0 - 0.9 % Final     Immature Granulocyte Count (IG) includes promyelocytes,    myelocytes and metamyelocytes but does not " include bands.   Percent differential counts (%) should be interpreted in the   context of the absolute cell counts (cells/L).    Lymphocytes % 07/26/2023 36.7  13.0 - 44.0 % Final    Monocytes % 07/26/2023 7.5  2.0 - 10.0 % Final    Eosinophils % 07/26/2023 2.6  0.0 - 6.0 % Final    Basophils % 07/26/2023 0.8  0.0 - 2.0 % Final    Neutrophils Absolute 07/26/2023 2.77  1.20 - 7.70 x10E9/L Final    Lymphocytes Absolute 07/26/2023 1.95  1.20 - 4.80 x10E9/L Final    Monocytes Absolute 07/26/2023 0.40  0.10 - 1.00 x10E9/L Final    Eosinophils Absolute 07/26/2023 0.14  0.00 - 0.70 x10E9/L Final    Basophils Absolute 07/26/2023 0.04  0.00 - 0.10 x10E9/L Final    Glucose 07/26/2023 124 (H)  74 - 99 mg/dL Final    Sodium 07/26/2023 142  136 - 145 mmol/L Final    Potassium 07/26/2023 4.4  3.5 - 5.3 mmol/L Final    Chloride 07/26/2023 104  98 - 107 mmol/L Final    Bicarbonate 07/26/2023 29  21 - 32 mmol/L Final    Anion Gap 07/26/2023 13  10 - 20 mmol/L Final    Urea Nitrogen 07/26/2023 19  6 - 23 mg/dL Final    Creatinine 07/26/2023 1.10  0.50 - 1.30 mg/dL Final    GFR MALE 07/26/2023 79  >90 mL/min/1.73m2 Final     CALCULATIONS OF ESTIMATED GFR ARE PERFORMED   USING THE 2021 CKD-EPI STUDY REFIT EQUATION   WITHOUT THE RACE VARIABLE FOR THE IDMS-TRACEABLE   CREATININE METHODS.    https://jasn.asnjournals.org/content/early/2021/09/22/ASN.0356481825    Calcium 07/26/2023 9.5  8.6 - 10.3 mg/dL Final    Albumin 07/26/2023 4.5  3.4 - 5.0 g/dL Final    Alkaline Phosphatase 07/26/2023 57  33 - 120 U/L Final    Total Protein 07/26/2023 6.8  6.4 - 8.2 g/dL Final    AST 07/26/2023 17  9 - 39 U/L Final    Total Bilirubin 07/26/2023 0.7  0.0 - 1.2 mg/dL Final    ALT (SGPT) 07/26/2023 25  10 - 52 U/L Final     Patients treated with Sulfasalazine may generate    falsely decreased results for ALT.    Hemoglobin A1C 07/26/2023 6.6 (A)  % Final         Diagnosis of Diabetes-Adults   Non-Diabetic: < or = 5.6%   Increased risk for developing  diabetes: 5.7-6.4%   Diagnostic of diabetes: > or = 6.5%  .       Monitoring of Diabetes                Age (y)     Therapeutic Goal (%)   Adults:          >18           <7.0   Pediatrics:    13-18           <7.5                   7-12           <8.0                   0- 6            7.5-8.5   American Diabetes Association. Diabetes Care 33(S1), Jan 2010.    Estimated Average Glucose 07/26/2023 143  MG/DL Final    Cholesterol 07/26/2023 204 (H)  0 - 199 mg/dL Final    .      AGE      DESIRABLE   BORDERLINE HIGH   HIGH     0-19 Y     0 - 169       170 - 199     >/= 200    20-24 Y     0 - 189       190 - 224     >/= 225         >24 Y     0 - 199       200 - 239     >/= 240   **All ranges are based on fasting samples. Specific   therapeutic targets will vary based on patient-specific   cardiac risk.  .   Pediatric guidelines reference:Pediatrics 2011, 128(S5).   Adult guidelines reference: NCEP ATPIII Guidelines,     DAMIR 2001, 258:4847-97  .   Venipuncture immediately after or during the    administration of Metamizole may lead to falsely   low results. Testing should be performed immediately   prior to Metamizole dosing.    HDL 07/26/2023 32.9 (A)  mg/dL Final    .      AGE      VERY LOW   LOW     NORMAL    HIGH       0-19 Y       < 35   < 40     40-45     ----    20-24 Y       ----   < 40       >45     ----      >24 Y       ----   < 40     40-60      >60  .    Cholesterol/HDL Ratio 07/26/2023 6.2 (A)   Final    REF VALUES  DESIRABLE  < 3.4  HIGH RISK  > 5.0    LDL 07/26/2023 101 (H)  0 - 99 mg/dL Final    .                           NEAR      BORD      AGE      DESIRABLE  OPTIMAL    HIGH     HIGH     VERY HIGH     0-19 Y     0 - 109     ---    110-129   >/= 130     ----    20-24 Y     0 - 119     ---    120-159   >/= 160     ----      >24 Y     0 -  99   100-129  130-159   160-189     >/=190  .    VLDL 07/26/2023 70 (H)  0 - 40 mg/dL Final    Triglycerides 07/26/2023 349 (H)  0 - 149 mg/dL Final    .      AGE       DESIRABLE   BORDERLINE HIGH   HIGH     VERY HIGH   0 D-90 D    19 - 174         ----         ----        ----  91 D- 9 Y     0 -  74        75 -  99     >/= 100      ----    10-19 Y     0 -  89        90 - 129     >/= 130      ----    20-24 Y     0 - 114       115 - 149     >/= 150      ----         >24 Y     0 - 149       150 - 199    200- 499    >/= 500  .   Venipuncture immediately after or during the    administration of Metamizole may lead to falsely   low results. Testing should be performed immediately   prior to Metamizole dosing.    Non HDL Cholesterol 07/26/2023 171  mg/dL Final        AGE      DESIRABLE   BORDERLINE HIGH   HIGH     VERY HIGH     0-19 Y     0 - 119       120 - 144     >/= 145    >/= 160    20-24 Y     0 - 149       150 - 189     >/= 190      ----         >24 Y    30 MG/DL ABOVE LDL CHOLESTEROL GOAL  .    Prostate Specific Antigen,Screen 07/26/2023 0.30  0.00 - 4.00 ng/mL Final    The FDA requires that the method used for PSA assay be   reported to the physician. Values obtained with different   assay methods must not be used interchangeably. This test  was performed at [facility] using the Access   Hybritech PSA assay is a two-site immunoenzymatic sandwich   assay. The assay is approved for measurement of   prostate-specific antigen (PSA)in serum and may be used   in conjunction with a digital rectal examination in men   50 years and older as an aid in detection of prostate   cancer.  5-Alpha-reductase inhibitors (e.g. Proscar, Finasteride,   Avodart, Dutasteride and Dayanna) for the treatment of BPH   have been shown to lower PSA levels by an average of 50%   after 6 months of treatment.       Problem List Items Addressed This Visit       Obstructive sleep apnea    Current Assessment & Plan     We will submit the order for AutoPAP machine to the [employer] [facility]. Patient was advised to contact out office if he does not here from them within the next 2 weeks.          Relevant Orders    Positive Airway Pressure (PAP) Therapy     Scribe Attestation  By signing my name below, I, Andrey Platt Scrjuan carlos   attest that this documentation has been prepared under the direction and in the presence of Ez Maier MD.

## 2023-08-22 NOTE — TELEPHONE ENCOUNTER
Recent Visits  Date Type Provider Dept   07/27/23 Office Visit Ez Maier MD Do [id number] [facility]   07/07/23 Office Visit Kelly J Slavik-Bosworth, APRN-CNP Do [id number] [facility]   05/30/23 Office Visit Ez Maier MD Do [id number] [facility]   05/09/23 Office Visit Kelly J Slavik-Bosworth, APRN-CNP Do [id number] [facility]   04/27/23 Office Visit Ez Maier MD Do [id number] [facility]   03/30/23 Office Visit Ez Maier MD Do [id number] [facility]   03/13/23 Office Visit Wilbur Aviles, SHAGGY-CNP Do [id number] [facility]   Showing recent visits within past 540 days and meeting all other requirements  Today's Visits  Date Type Provider Dept   08/22/23 Appointment Ez Maier MD Do [id number] [facility]   Showing today's visits and meeting all other requirements  Future Appointments  Date Type Provider Dept   10/26/23 Appointment Ez Maier MD Do [id number] [facility]   Showing future appointments within next 180 days and meeting all other requirements

## 2023-10-20 ENCOUNTER — OFFICE VISIT (OUTPATIENT)
Dept: OPHTHALMOLOGY | Facility: CLINIC | Age: 58
End: 2023-10-20
Payer: COMMERCIAL

## 2023-10-20 DIAGNOSIS — H47.10 EDEMA OF OPTIC DISC OF RIGHT EYE: ICD-10-CM

## 2023-10-20 DIAGNOSIS — H53.451: Primary | ICD-10-CM

## 2023-10-20 PROCEDURE — 92083 EXTENDED VISUAL FIELD XM: CPT | Performed by: PSYCHIATRY & NEUROLOGY

## 2023-10-20 PROCEDURE — 92133 CPTRZD OPH DX IMG PST SGM ON: CPT | Performed by: PSYCHIATRY & NEUROLOGY

## 2023-10-20 PROCEDURE — 99214 OFFICE O/P EST MOD 30 MIN: CPT | Performed by: PSYCHIATRY & NEUROLOGY

## 2023-10-20 ASSESSMENT — ENCOUNTER SYMPTOMS
HEMATOLOGIC/LYMPHATIC NEGATIVE: 0
EYES NEGATIVE: 1
CARDIOVASCULAR NEGATIVE: 0
GASTROINTESTINAL NEGATIVE: 0
RESPIRATORY NEGATIVE: 0
ALLERGIC/IMMUNOLOGIC NEGATIVE: 0
MUSCULOSKELETAL NEGATIVE: 0
ENDOCRINE NEGATIVE: 0
CONSTITUTIONAL NEGATIVE: 0
PSYCHIATRIC NEGATIVE: 0
NEUROLOGICAL NEGATIVE: 0

## 2023-10-20 ASSESSMENT — EXTERNAL EXAM - RIGHT EYE: OD_EXAM: NORMAL

## 2023-10-20 ASSESSMENT — TONOMETRY
OS_IOP_MMHG: 12
OD_IOP_MMHG: 11
IOP_METHOD: TONOPEN

## 2023-10-20 ASSESSMENT — CONF VISUAL FIELD
OS_INFERIOR_TEMPORAL_RESTRICTION: 0
OS_SUPERIOR_TEMPORAL_RESTRICTION: 0
OD_INFERIOR_TEMPORAL_RESTRICTION: 1
OS_NORMAL: 1
OD_INFERIOR_NASAL_RESTRICTION: 1
OS_SUPERIOR_NASAL_RESTRICTION: 0
OS_INFERIOR_NASAL_RESTRICTION: 0

## 2023-10-20 ASSESSMENT — VISUAL ACUITY
OD_CC: 20/20-2
OS_CC: 20/20
METHOD: SNELLEN - LINEAR

## 2023-10-20 ASSESSMENT — SLIT LAMP EXAM - LIDS
COMMENTS: NORMAL
COMMENTS: NORMAL

## 2023-10-20 ASSESSMENT — CUP TO DISC RATIO
OD_RATIO: 0.2
OS_RATIO: 0.15

## 2023-10-20 ASSESSMENT — EXTERNAL EXAM - LEFT EYE: OS_EXAM: NORMAL

## 2023-10-20 NOTE — PROGRESS NOTES
Assessment and Plan    07/21/2023 ESR 6. CRP 0.20 mg/dL.  10/09/2019 ESR 15. CRP 0.47 mg/dL.    10/20/2023 OCT RNFL OD 63 with S & N thinning & . (Lower right eye (OD) & stable left eye (OS))  08/25/2023 OCT RNFL  & . (lower OD & stable OS)  07/24/2023 OCT RNFL  & .    10/20/2023 HVF 24-2 OD fovea 37, inferior altitudinal MD -17.23 & OS fovea 39, wnl MD +0.03.  08/25/2023 HVF 24-2 OD fovea 38, inferior altitudinal MD -16.45 & OS fovea 37, wnl MD -0.60.  07/24/2023 HVF 24-2 OD fovea 36, inferior altitudinal & superior nasal step MD -16.67 & OS fovea 38, wnl MD -0.71.    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    Right eye inferior visual field loss remains stable, finally with resolution of previously seen right optic disc edema, now with sectoral atrophy consistent with non-arteritic anterior ischemic optic neuropathy without evidence of arteritic anterior ischemic optic neuropathy. We discussed expected stability and risks to the fellow eye with the need for long term risk reduction.    Plan    Vasculopathic risk factor control including re-starting treatment of JERI.  Defer MRI given low likelihood.    Follow up in 6 months with HVF & OCT. (dilated 07/24/2023)

## 2023-10-25 ENCOUNTER — APPOINTMENT (OUTPATIENT)
Dept: CARDIOLOGY | Facility: CLINIC | Age: 58
End: 2023-10-25
Payer: COMMERCIAL

## 2023-10-26 ENCOUNTER — APPOINTMENT (OUTPATIENT)
Dept: PRIMARY CARE | Facility: CLINIC | Age: 58
End: 2023-10-26
Payer: COMMERCIAL

## 2023-10-30 DIAGNOSIS — E78.2 MIXED HYPERLIPIDEMIA: ICD-10-CM

## 2023-10-30 RX ORDER — ICOSAPENT ETHYL 1 G/1
2 CAPSULE ORAL 2 TIMES DAILY
Qty: 120 CAPSULE | Refills: 5 | Status: SHIPPED | OUTPATIENT
Start: 2023-10-30 | End: 2024-05-13 | Stop reason: SDUPTHER

## 2023-10-30 NOTE — TELEPHONE ENCOUNTER
Rx Refill Request Telephone Encounter    Name:  Paulo Roman  :  [id number]  Medication Name:  Vascepa 1grm   Specific Pharmacy location: [facility]   Date of last appointment:  23  Date of next appointment:  23  Best number to reach patient:  [phone]

## 2023-11-12 ENCOUNTER — TELEPHONE (OUTPATIENT)
Dept: PRIMARY CARE | Facility: CLINIC | Age: 58
End: 2023-11-12

## 2023-11-13 NOTE — TELEPHONE ENCOUNTER
We received a request for prior authorization on the patient's dexcom 6 sensor from their pharmacy. Prior authorization was submitted to insurance today. We will await their determination.

## 2023-11-27 DIAGNOSIS — E78.2 MIXED HYPERLIPIDEMIA: ICD-10-CM

## 2023-11-27 DIAGNOSIS — F41.1 GENERALIZED ANXIETY DISORDER: Primary | ICD-10-CM

## 2023-11-27 DIAGNOSIS — F33.1 MODERATE EPISODE OF RECURRENT MAJOR DEPRESSIVE DISORDER (MULTI): ICD-10-CM

## 2023-11-27 RX ORDER — ESCITALOPRAM OXALATE 20 MG/1
20 TABLET ORAL DAILY
Qty: 30 TABLET | Refills: 0 | Status: SHIPPED | OUTPATIENT
Start: 2023-11-27 | End: 2023-11-29 | Stop reason: WASHOUT

## 2023-11-27 RX ORDER — FENOFIBRATE 160 MG/1
160 TABLET ORAL DAILY
Qty: 90 TABLET | Refills: 3 | Status: SHIPPED | OUTPATIENT
Start: 2023-11-27 | End: 2024-11-26

## 2023-11-27 RX ORDER — ATORVASTATIN CALCIUM 80 MG/1
80 TABLET, FILM COATED ORAL NIGHTLY
Qty: 90 TABLET | Refills: 3 | Status: SHIPPED | OUTPATIENT
Start: 2023-11-27

## 2023-11-27 NOTE — TELEPHONE ENCOUNTER
Rx Refill Request Telephone Encounter    Name:  Paulo Roman  :  [id number]  Medication Name:  Lexapro 20mg   Specific Pharmacy location:  [facility] in [place]   Date of last appointment:  23  Date of next appointment:  23  Best number to reach patient:  [phone]

## 2023-11-29 ENCOUNTER — OFFICE VISIT (OUTPATIENT)
Dept: PRIMARY CARE | Facility: CLINIC | Age: 58
End: 2023-11-29
Payer: COMMERCIAL

## 2023-11-29 ENCOUNTER — LAB (OUTPATIENT)
Dept: LAB | Facility: LAB | Age: 58
End: 2023-11-29
Payer: COMMERCIAL

## 2023-11-29 ENCOUNTER — ANCILLARY PROCEDURE (OUTPATIENT)
Dept: RADIOLOGY | Facility: CLINIC | Age: 58
End: 2023-11-29
Payer: COMMERCIAL

## 2023-11-29 VITALS
RESPIRATION RATE: 16 BRPM | SYSTOLIC BLOOD PRESSURE: 149 MMHG | WEIGHT: 247.2 LBS | DIASTOLIC BLOOD PRESSURE: 79 MMHG | HEART RATE: 112 BPM | HEIGHT: 70 IN | BODY MASS INDEX: 35.39 KG/M2 | TEMPERATURE: 98 F | OXYGEN SATURATION: 95 %

## 2023-11-29 DIAGNOSIS — R10.84 GENERALIZED ABDOMINAL PAIN: ICD-10-CM

## 2023-11-29 DIAGNOSIS — R11.12 PROJECTILE VOMITING WITH NAUSEA: ICD-10-CM

## 2023-11-29 DIAGNOSIS — R10.84 GENERALIZED ABDOMINAL PAIN: Primary | ICD-10-CM

## 2023-11-29 LAB
ALBUMIN SERPL BCP-MCNC: 4.7 G/DL (ref 3.4–5)
ALP SERPL-CCNC: 65 U/L (ref 33–120)
ALT SERPL W P-5'-P-CCNC: 31 U/L (ref 10–52)
ANION GAP SERPL CALC-SCNC: 14 MMOL/L (ref 10–20)
AST SERPL W P-5'-P-CCNC: 17 U/L (ref 9–39)
BASOPHILS # BLD AUTO: 0.03 X10*3/UL (ref 0–0.1)
BASOPHILS NFR BLD AUTO: 0.4 %
BILIRUB SERPL-MCNC: 0.5 MG/DL (ref 0–1.2)
BUN SERPL-MCNC: 24 MG/DL (ref 6–23)
CALCIUM SERPL-MCNC: 9.5 MG/DL (ref 8.6–10.3)
CHLORIDE SERPL-SCNC: 102 MMOL/L (ref 98–107)
CHOLEST SERPL-MCNC: 275 MG/DL (ref 0–199)
CHOLESTEROL/HDL RATIO: 6.9
CO2 SERPL-SCNC: 26 MMOL/L (ref 21–32)
CREAT SERPL-MCNC: 1.1 MG/DL (ref 0.5–1.3)
CRP SERPL-MCNC: 0.37 MG/DL
EOSINOPHIL # BLD AUTO: 0.08 X10*3/UL (ref 0–0.7)
EOSINOPHIL NFR BLD AUTO: 0.9 %
ERYTHROCYTE [DISTWIDTH] IN BLOOD BY AUTOMATED COUNT: 12.5 % (ref 11.5–14.5)
GFR SERPL CREATININE-BSD FRML MDRD: 79 ML/MIN/1.73M*2
GLUCOSE SERPL-MCNC: 145 MG/DL (ref 74–99)
HCT VFR BLD AUTO: 49.2 % (ref 41–52)
HDLC SERPL-MCNC: 39.7 MG/DL
HGB BLD-MCNC: 16.3 G/DL (ref 13.5–17.5)
IMM GRANULOCYTES # BLD AUTO: 0.02 X10*3/UL (ref 0–0.7)
IMM GRANULOCYTES NFR BLD AUTO: 0.2 % (ref 0–0.9)
LDLC SERPL CALC-MCNC: ABNORMAL MG/DL
LYMPHOCYTES # BLD AUTO: 1.81 X10*3/UL (ref 1.2–4.8)
LYMPHOCYTES NFR BLD AUTO: 21.4 %
MCH RBC QN AUTO: 29.7 PG (ref 26–34)
MCHC RBC AUTO-ENTMCNC: 33.1 G/DL (ref 32–36)
MCV RBC AUTO: 90 FL (ref 80–100)
MONOCYTES # BLD AUTO: 0.46 X10*3/UL (ref 0.1–1)
MONOCYTES NFR BLD AUTO: 5.5 %
NEUTROPHILS # BLD AUTO: 6.04 X10*3/UL (ref 1.2–7.7)
NEUTROPHILS NFR BLD AUTO: 71.6 %
NON HDL CHOLESTEROL: 235 MG/DL (ref 0–149)
NRBC BLD-RTO: 0 /100 WBCS (ref 0–0)
PLATELET # BLD AUTO: 203 X10*3/UL (ref 150–450)
POTASSIUM SERPL-SCNC: 4 MMOL/L (ref 3.5–5.3)
PROT SERPL-MCNC: 7.1 G/DL (ref 6.4–8.2)
RBC # BLD AUTO: 5.48 X10*6/UL (ref 4.5–5.9)
SODIUM SERPL-SCNC: 138 MMOL/L (ref 136–145)
TRIGL SERPL-MCNC: 446 MG/DL (ref 0–149)
VLDL: ABNORMAL
WBC # BLD AUTO: 8.4 X10*3/UL (ref 4.4–11.3)

## 2023-11-29 PROCEDURE — 36415 COLL VENOUS BLD VENIPUNCTURE: CPT

## 2023-11-29 PROCEDURE — 99214 OFFICE O/P EST MOD 30 MIN: CPT | Performed by: NURSE PRACTITIONER

## 2023-11-29 PROCEDURE — 80061 LIPID PANEL: CPT

## 2023-11-29 PROCEDURE — 74021 RADEX ABDOMEN 3+ VIEWS: CPT | Mod: FY

## 2023-11-29 PROCEDURE — 80053 COMPREHEN METABOLIC PANEL: CPT

## 2023-11-29 PROCEDURE — 85025 COMPLETE CBC W/AUTO DIFF WBC: CPT

## 2023-11-29 PROCEDURE — 86140 C-REACTIVE PROTEIN: CPT

## 2023-11-29 RX ORDER — ONDANSETRON 4 MG/1
4 TABLET, ORALLY DISINTEGRATING ORAL EVERY 8 HOURS PRN
Qty: 20 TABLET | Refills: 0 | Status: SHIPPED | OUTPATIENT
Start: 2023-11-29 | End: 2023-12-06

## 2023-11-29 ASSESSMENT — ENCOUNTER SYMPTOMS
RHINORRHEA: 0
BLOOD IN STOOL: 0
ADENOPATHY: 0
FATIGUE: 0
DIAPHORESIS: 0
APNEA: 0
CONSTIPATION: 0
ARTHRALGIAS: 0
PALPITATIONS: 0
FEVER: 0
ABDOMINAL PAIN: 0
ACTIVITY CHANGE: 0
ANAL BLEEDING: 0
WHEEZING: 0
SINUS PRESSURE: 0
DIZZINESS: 0
DIFFICULTY URINATING: 0
AGITATION: 0
RECTAL PAIN: 0
SHORTNESS OF BREATH: 0
NAUSEA: 1
COLOR CHANGE: 0
CHILLS: 0
SINUS PAIN: 0
DIARRHEA: 1
ABDOMINAL DISTENTION: 0
VOMITING: 1
COUGH: 0

## 2023-11-29 NOTE — PROGRESS NOTES
"Subjective   Patient ID: Paulo Rmoan is a 58 y.o. male who presents for URI.    The patient reports that he has taken Pepto, Nexium and denita seltzer with out relief.     URI   This is a new problem. The current episode started in the past 7 days. The problem has been unchanged. Associated symptoms include diarrhea, nausea and vomiting. Pertinent negatives include no abdominal pain, chest pain, congestion, coughing, ear pain, rhinorrhea, sinus pain, sneezing or wheezing. The treatment provided no relief.   Reports that he has had soup the last two days and he has been nauseous and vomited after both attempts at eating. Denies nausea after drinking water.      Review of Systems   Constitutional:  Negative for activity change, chills, diaphoresis, fatigue and fever.   HENT:  Negative for congestion, ear discharge, ear pain, postnasal drip, rhinorrhea, sinus pressure, sinus pain and sneezing.    Respiratory:  Negative for apnea, cough, shortness of breath and wheezing.    Cardiovascular:  Negative for chest pain and palpitations.   Gastrointestinal:  Positive for diarrhea, nausea and vomiting. Negative for abdominal distention, abdominal pain, anal bleeding, blood in stool, constipation and rectal pain.   Genitourinary:  Negative for difficulty urinating.   Musculoskeletal:  Negative for arthralgias.   Skin:  Negative for color change.   Neurological:  Negative for dizziness.   Hematological:  Negative for adenopathy.   Psychiatric/Behavioral:  Negative for agitation.        Objective   /79 Comment: automated  Pulse (!) 112   Temp 36.7 °C (98 °F) (Temporal)   Resp 16   Ht 1.778 m (5' 10\")   Wt 112 kg (247 lb 3.2 oz)   SpO2 95%   BMI 35.47 kg/m²     Physical Exam  Vitals and nursing note reviewed.   Constitutional:       Appearance: Normal appearance.   HENT:      Head: Normocephalic.      Mouth/Throat:      Mouth: Mucous membranes are dry.   Eyes:      Extraocular Movements: Extraocular movements intact. "      Pupils: Pupils are equal, round, and reactive to light.   Cardiovascular:      Rate and Rhythm: Normal rate and regular rhythm.      Pulses: Normal pulses.   Pulmonary:      Effort: Pulmonary effort is normal.      Breath sounds: Normal breath sounds.   Abdominal:      General: Bowel sounds are normal. There is distension.      Tenderness: There is abdominal tenderness. There is no right CVA tenderness, left CVA tenderness, guarding or rebound.      Hernia: No hernia is present.   Skin:     General: Skin is warm and dry.      Capillary Refill: Capillary refill takes less than 2 seconds.   Neurological:      Mental Status: He is alert and oriented to person, place, and time. Mental status is at baseline.   Psychiatric:         Mood and Affect: Mood normal.         Behavior: Behavior normal.         Thought Content: Thought content normal.         Judgment: Judgment normal.         Assessment/Plan   Problem List Items Addressed This Visit    None  Visit Diagnoses         Codes    Generalized abdominal pain    -  Primary R10.84    Relevant Medications    ondansetron ODT (Zofran-ODT) 4 mg disintegrating tablet    Other Relevant Orders    CBC and Auto Differential    C-reactive protein    Comprehensive metabolic panel    Lipid panel    XR abdomen 3+ views (Completed)    Projectile vomiting with nausea     R11.12    Relevant Medications    ondansetron ODT (Zofran-ODT) 4 mg disintegrating tablet          Discussed diagnosis, treatment and anticipated course of illness with the patient who verbalized understanding.   Abdominal xray results reviewed. Nonobstructive gas pattern seen. Discussed results with the patient. Reports that he will be getting his lab work completed within the next several days. Discussed BRAT diet and bowel rest.   Instructed to take medications as prescribed. Follow up with primary care provider   in the event signs and symptoms worsen or fail to improve with treatment plan.

## 2023-12-23 ENCOUNTER — HOSPITAL ENCOUNTER (EMERGENCY)
Facility: HOSPITAL | Age: 58
Discharge: OTHER INSTITUTION | End: 2023-12-23
Attending: EMERGENCY MEDICINE
Payer: COMMERCIAL

## 2023-12-23 ENCOUNTER — LAB (OUTPATIENT)
Dept: LAB | Facility: LAB | Age: 58
End: 2023-12-23
Payer: COMMERCIAL

## 2023-12-23 ENCOUNTER — APPOINTMENT (OUTPATIENT)
Dept: CARDIOLOGY | Facility: HOSPITAL | Age: 58
End: 2023-12-23
Payer: COMMERCIAL

## 2023-12-23 ENCOUNTER — APPOINTMENT (OUTPATIENT)
Dept: RADIOLOGY | Facility: HOSPITAL | Age: 58
End: 2023-12-23
Payer: COMMERCIAL

## 2023-12-23 ENCOUNTER — HOSPITAL ENCOUNTER (OUTPATIENT)
Facility: HOSPITAL | Age: 58
Setting detail: OBSERVATION
Discharge: HOME | End: 2023-12-24
Attending: EMERGENCY MEDICINE | Admitting: PHYSICIAN ASSISTANT
Payer: COMMERCIAL

## 2023-12-23 VITALS
OXYGEN SATURATION: 95 % | WEIGHT: 232 LBS | BODY MASS INDEX: 33.21 KG/M2 | HEART RATE: 76 BPM | HEIGHT: 70 IN | SYSTOLIC BLOOD PRESSURE: 168 MMHG | RESPIRATION RATE: 18 BRPM | TEMPERATURE: 97.2 F | DIASTOLIC BLOOD PRESSURE: 80 MMHG

## 2023-12-23 DIAGNOSIS — H53.9 VISUAL DISTURBANCE: Primary | ICD-10-CM

## 2023-12-23 DIAGNOSIS — Z79.4 TYPE 2 DIABETES MELLITUS WITHOUT COMPLICATION, WITH LONG-TERM CURRENT USE OF INSULIN (MULTI): ICD-10-CM

## 2023-12-23 DIAGNOSIS — I10 ESSENTIAL HYPERTENSION: ICD-10-CM

## 2023-12-23 DIAGNOSIS — E78.2 MIXED HYPERLIPIDEMIA: ICD-10-CM

## 2023-12-23 DIAGNOSIS — H53.8 BLURRED VISION, LEFT EYE: Primary | ICD-10-CM

## 2023-12-23 DIAGNOSIS — E11.9 TYPE 2 DIABETES MELLITUS WITHOUT COMPLICATION, WITH LONG-TERM CURRENT USE OF INSULIN (MULTI): ICD-10-CM

## 2023-12-23 LAB
ALBUMIN SERPL BCP-MCNC: 4.5 G/DL (ref 3.4–5)
ALBUMIN SERPL BCP-MCNC: 4.6 G/DL (ref 3.4–5)
ALP SERPL-CCNC: 47 U/L (ref 33–120)
ALP SERPL-CCNC: 50 U/L (ref 33–120)
ALT SERPL W P-5'-P-CCNC: 23 U/L (ref 10–52)
ALT SERPL W P-5'-P-CCNC: 24 U/L (ref 10–52)
ANION GAP SERPL CALC-SCNC: 13 MMOL/L (ref 10–20)
ANION GAP SERPL CALC-SCNC: 14 MMOL/L (ref 10–20)
AST SERPL W P-5'-P-CCNC: 17 U/L (ref 9–39)
AST SERPL W P-5'-P-CCNC: 19 U/L (ref 9–39)
BASOPHILS # BLD AUTO: 0.03 X10*3/UL (ref 0–0.1)
BASOPHILS NFR BLD AUTO: 0.7 %
BILIRUB SERPL-MCNC: 0.5 MG/DL (ref 0–1.2)
BILIRUB SERPL-MCNC: 0.5 MG/DL (ref 0–1.2)
BUN SERPL-MCNC: 19 MG/DL (ref 6–23)
BUN SERPL-MCNC: 19 MG/DL (ref 6–23)
CALCIUM SERPL-MCNC: 8.9 MG/DL (ref 8.6–10.3)
CALCIUM SERPL-MCNC: 9 MG/DL (ref 8.6–10.3)
CARDIAC TROPONIN I PNL SERPL HS: 3 NG/L (ref 0–20)
CHLORIDE SERPL-SCNC: 106 MMOL/L (ref 98–107)
CHLORIDE SERPL-SCNC: 107 MMOL/L (ref 98–107)
CHOLEST SERPL-MCNC: 176 MG/DL (ref 0–199)
CHOLESTEROL/HDL RATIO: 4.6
CO2 SERPL-SCNC: 26 MMOL/L (ref 21–32)
CO2 SERPL-SCNC: 28 MMOL/L (ref 21–32)
CREAT SERPL-MCNC: 1.06 MG/DL (ref 0.5–1.3)
CREAT SERPL-MCNC: 1.12 MG/DL (ref 0.5–1.3)
CREAT UR-MCNC: 106.9 MG/DL (ref 20–370)
CRP SERPL-MCNC: 0.23 MG/DL
EOSINOPHIL # BLD AUTO: 0.11 X10*3/UL (ref 0–0.7)
EOSINOPHIL NFR BLD AUTO: 2.6 %
ERYTHROCYTE [DISTWIDTH] IN BLOOD BY AUTOMATED COUNT: 12.2 % (ref 11.5–14.5)
ERYTHROCYTE [SEDIMENTATION RATE] IN BLOOD BY WESTERGREN METHOD: 5 MM/H (ref 0–20)
GFR SERPL CREATININE-BSD FRML MDRD: 77 ML/MIN/1.73M*2
GFR SERPL CREATININE-BSD FRML MDRD: 82 ML/MIN/1.73M*2
GLUCOSE BLD MANUAL STRIP-MCNC: 186 MG/DL (ref 74–99)
GLUCOSE BLD MANUAL STRIP-MCNC: 81 MG/DL (ref 74–99)
GLUCOSE SERPL-MCNC: 100 MG/DL (ref 74–99)
GLUCOSE SERPL-MCNC: 104 MG/DL (ref 74–99)
HCT VFR BLD AUTO: 47.5 % (ref 41–52)
HDLC SERPL-MCNC: 38 MG/DL
HGB BLD-MCNC: 16.2 G/DL (ref 13.5–17.5)
IMM GRANULOCYTES # BLD AUTO: 0.01 X10*3/UL (ref 0–0.7)
IMM GRANULOCYTES NFR BLD AUTO: 0.2 % (ref 0–0.9)
INR PPP: 1 (ref 0.9–1.1)
LDLC SERPL CALC-MCNC: 104 MG/DL
LYMPHOCYTES # BLD AUTO: 1.58 X10*3/UL (ref 1.2–4.8)
LYMPHOCYTES NFR BLD AUTO: 36.9 %
MAGNESIUM SERPL-MCNC: 2.06 MG/DL (ref 1.6–2.4)
MCH RBC QN AUTO: 29.9 PG (ref 26–34)
MCHC RBC AUTO-ENTMCNC: 34.1 G/DL (ref 32–36)
MCV RBC AUTO: 88 FL (ref 80–100)
MICROALBUMIN UR-MCNC: 7 MG/L
MICROALBUMIN/CREAT UR: 6.5 UG/MG CREAT
MONOCYTES # BLD AUTO: 0.33 X10*3/UL (ref 0.1–1)
MONOCYTES NFR BLD AUTO: 7.7 %
NEUTROPHILS # BLD AUTO: 2.22 X10*3/UL (ref 1.2–7.7)
NEUTROPHILS NFR BLD AUTO: 51.9 %
NON HDL CHOLESTEROL: 138 MG/DL (ref 0–149)
NRBC BLD-RTO: 0 /100 WBCS (ref 0–0)
PLATELET # BLD AUTO: 167 X10*3/UL (ref 150–450)
POTASSIUM SERPL-SCNC: 4.1 MMOL/L (ref 3.5–5.3)
POTASSIUM SERPL-SCNC: 4.3 MMOL/L (ref 3.5–5.3)
PROT SERPL-MCNC: 6.8 G/DL (ref 6.4–8.2)
PROT SERPL-MCNC: 7.2 G/DL (ref 6.4–8.2)
PROTHROMBIN TIME: 11.5 SECONDS (ref 9.8–12.8)
RBC # BLD AUTO: 5.41 X10*6/UL (ref 4.5–5.9)
SODIUM SERPL-SCNC: 142 MMOL/L (ref 136–145)
SODIUM SERPL-SCNC: 144 MMOL/L (ref 136–145)
T GONDII IGG SER-ACNC: NONREACTIVE
TRIGL SERPL-MCNC: 168 MG/DL (ref 0–149)
VLDL: 34 MG/DL (ref 0–40)
WBC # BLD AUTO: 4.3 X10*3/UL (ref 4.4–11.3)

## 2023-12-23 PROCEDURE — 86140 C-REACTIVE PROTEIN: CPT | Performed by: PHYSICIAN ASSISTANT

## 2023-12-23 PROCEDURE — 86053 AQAPRN-4 ANTB FLO CYTMTRY EA: CPT

## 2023-12-23 PROCEDURE — 36415 COLL VENOUS BLD VENIPUNCTURE: CPT | Performed by: REGISTERED NURSE

## 2023-12-23 PROCEDURE — 82947 ASSAY GLUCOSE BLOOD QUANT: CPT

## 2023-12-23 PROCEDURE — 87476 LYME DIS DNA AMP PROBE: CPT | Performed by: PHYSICIAN ASSISTANT

## 2023-12-23 PROCEDURE — G0378 HOSPITAL OBSERVATION PER HR: HCPCS

## 2023-12-23 PROCEDURE — 84484 ASSAY OF TROPONIN QUANT: CPT | Performed by: REGISTERED NURSE

## 2023-12-23 PROCEDURE — 86363 MOG-IGG1 ANTB FLO CYTMTRY EA: CPT

## 2023-12-23 PROCEDURE — 82164 ANGIOTENSIN I ENZYME TEST: CPT | Performed by: PHYSICIAN ASSISTANT

## 2023-12-23 PROCEDURE — 85652 RBC SED RATE AUTOMATED: CPT | Performed by: PHYSICIAN ASSISTANT

## 2023-12-23 PROCEDURE — 80053 COMPREHEN METABOLIC PANEL: CPT | Performed by: REGISTERED NURSE

## 2023-12-23 PROCEDURE — 86778 TOXOPLASMA ANTIBODY IGM: CPT | Performed by: PHYSICIAN ASSISTANT

## 2023-12-23 PROCEDURE — 83036 HEMOGLOBIN GLYCOSYLATED A1C: CPT

## 2023-12-23 PROCEDURE — 93005 ELECTROCARDIOGRAM TRACING: CPT

## 2023-12-23 PROCEDURE — 70450 CT HEAD/BRAIN W/O DYE: CPT

## 2023-12-23 PROCEDURE — 70450 CT HEAD/BRAIN W/O DYE: CPT | Performed by: RADIOLOGY

## 2023-12-23 PROCEDURE — 2500000001 HC RX 250 WO HCPCS SELF ADMINISTERED DRUGS (ALT 637 FOR MEDICARE OP): Performed by: PHYSICIAN ASSISTANT

## 2023-12-23 PROCEDURE — 85610 PROTHROMBIN TIME: CPT | Performed by: REGISTERED NURSE

## 2023-12-23 PROCEDURE — 99285 EMERGENCY DEPT VISIT HI MDM: CPT | Performed by: EMERGENCY MEDICINE

## 2023-12-23 PROCEDURE — 86611 BARTONELLA ANTIBODY: CPT | Performed by: PHYSICIAN ASSISTANT

## 2023-12-23 PROCEDURE — 80053 COMPREHEN METABOLIC PANEL: CPT

## 2023-12-23 PROCEDURE — 2500000002 HC RX 250 W HCPCS SELF ADMINISTERED DRUGS (ALT 637 FOR MEDICARE OP, ALT 636 FOR OP/ED): Performed by: PHYSICIAN ASSISTANT

## 2023-12-23 PROCEDURE — 80061 LIPID PANEL: CPT

## 2023-12-23 PROCEDURE — 86780 TREPONEMA PALLIDUM: CPT | Performed by: PHYSICIAN ASSISTANT

## 2023-12-23 PROCEDURE — 82570 ASSAY OF URINE CREATININE: CPT

## 2023-12-23 PROCEDURE — 86481 TB AG RESPONSE T-CELL SUSP: CPT | Performed by: PHYSICIAN ASSISTANT

## 2023-12-23 PROCEDURE — 99223 1ST HOSP IP/OBS HIGH 75: CPT | Performed by: PHYSICIAN ASSISTANT

## 2023-12-23 PROCEDURE — 82043 UR ALBUMIN QUANTITATIVE: CPT

## 2023-12-23 PROCEDURE — 83735 ASSAY OF MAGNESIUM: CPT | Performed by: REGISTERED NURSE

## 2023-12-23 PROCEDURE — 86777 TOXOPLASMA ANTIBODY: CPT | Performed by: PHYSICIAN ASSISTANT

## 2023-12-23 PROCEDURE — 85025 COMPLETE CBC W/AUTO DIFF WBC: CPT | Performed by: REGISTERED NURSE

## 2023-12-23 RX ORDER — DEXTROSE 50 % IN WATER (D50W) INTRAVENOUS SYRINGE
25
Status: DISCONTINUED | OUTPATIENT
Start: 2023-12-23 | End: 2023-12-24 | Stop reason: HOSPADM

## 2023-12-23 RX ORDER — ACETAMINOPHEN 325 MG/1
650 TABLET ORAL ONCE
Status: COMPLETED | OUTPATIENT
Start: 2023-12-23 | End: 2023-12-23

## 2023-12-23 RX ORDER — NAPROXEN SODIUM 220 MG/1
81 TABLET, FILM COATED ORAL DAILY
Status: DISCONTINUED | OUTPATIENT
Start: 2023-12-24 | End: 2023-12-24 | Stop reason: HOSPADM

## 2023-12-23 RX ORDER — ATORVASTATIN CALCIUM 80 MG/1
80 TABLET, FILM COATED ORAL NIGHTLY
Status: DISCONTINUED | OUTPATIENT
Start: 2023-12-23 | End: 2023-12-24 | Stop reason: HOSPADM

## 2023-12-23 RX ORDER — DAPAGLIFLOZIN 10 MG/1
10 TABLET, FILM COATED ORAL
Status: DISCONTINUED | OUTPATIENT
Start: 2023-12-24 | End: 2023-12-24 | Stop reason: HOSPADM

## 2023-12-23 RX ORDER — LISINOPRIL 5 MG/1
2.5 TABLET ORAL DAILY
Status: DISCONTINUED | OUTPATIENT
Start: 2023-12-23 | End: 2023-12-24 | Stop reason: HOSPADM

## 2023-12-23 RX ORDER — INSULIN LISPRO 100 [IU]/ML
0-10 INJECTION, SOLUTION INTRAVENOUS; SUBCUTANEOUS
Status: DISCONTINUED | OUTPATIENT
Start: 2023-12-23 | End: 2023-12-24 | Stop reason: HOSPADM

## 2023-12-23 RX ORDER — METOPROLOL SUCCINATE 25 MG/1
25 TABLET, EXTENDED RELEASE ORAL DAILY
Status: DISCONTINUED | OUTPATIENT
Start: 2023-12-23 | End: 2023-12-24 | Stop reason: HOSPADM

## 2023-12-23 RX ORDER — FENOFIBRATE 160 MG/1
160 TABLET ORAL DAILY
Status: DISCONTINUED | OUTPATIENT
Start: 2023-12-23 | End: 2023-12-24 | Stop reason: HOSPADM

## 2023-12-23 RX ORDER — TRAZODONE HYDROCHLORIDE 50 MG/1
50 TABLET ORAL NIGHTLY
Status: DISCONTINUED | OUTPATIENT
Start: 2023-12-23 | End: 2023-12-24 | Stop reason: HOSPADM

## 2023-12-23 RX ORDER — PANTOPRAZOLE SODIUM 40 MG/1
40 TABLET, DELAYED RELEASE ORAL
Status: DISCONTINUED | OUTPATIENT
Start: 2023-12-24 | End: 2023-12-24 | Stop reason: HOSPADM

## 2023-12-23 RX ORDER — DEXTROSE MONOHYDRATE 100 MG/ML
0.3 INJECTION, SOLUTION INTRAVENOUS ONCE AS NEEDED
Status: DISCONTINUED | OUTPATIENT
Start: 2023-12-23 | End: 2023-12-24 | Stop reason: HOSPADM

## 2023-12-23 RX ADMIN — METOPROLOL SUCCINATE 25 MG: 25 TABLET, EXTENDED RELEASE ORAL at 18:45

## 2023-12-23 RX ADMIN — FENOFIBRATE 160 MG: 160 TABLET, FILM COATED ORAL at 18:35

## 2023-12-23 RX ADMIN — ATORVASTATIN CALCIUM 80 MG: 80 TABLET, FILM COATED ORAL at 22:38

## 2023-12-23 RX ADMIN — INSULIN LISPRO 2 UNITS: 100 INJECTION, SOLUTION INTRAVENOUS; SUBCUTANEOUS at 18:45

## 2023-12-23 RX ADMIN — ACETAMINOPHEN 650 MG: 325 TABLET ORAL at 23:25

## 2023-12-23 RX ADMIN — LISINOPRIL 2.5 MG: 5 TABLET ORAL at 18:45

## 2023-12-23 ASSESSMENT — PAIN DESCRIPTION - LOCATION
LOCATION: HEAD
LOCATION: HEAD

## 2023-12-23 ASSESSMENT — PAIN SCALES - GENERAL
PAINLEVEL_OUTOF10: 0 - NO PAIN
PAINLEVEL_OUTOF10: 0 - NO PAIN
PAINLEVEL_OUTOF10: 3
PAINLEVEL_OUTOF10: 0 - NO PAIN
PAINLEVEL_OUTOF10: 0 - NO PAIN

## 2023-12-23 ASSESSMENT — COLUMBIA-SUICIDE SEVERITY RATING SCALE - C-SSRS
2. HAVE YOU ACTUALLY HAD ANY THOUGHTS OF KILLING YOURSELF?: NO
1. IN THE PAST MONTH, HAVE YOU WISHED YOU WERE DEAD OR WISHED YOU COULD GO TO SLEEP AND NOT WAKE UP?: NO
6. HAVE YOU EVER DONE ANYTHING, STARTED TO DO ANYTHING, OR PREPARED TO DO ANYTHING TO END YOUR LIFE?: NO
6. HAVE YOU EVER DONE ANYTHING, STARTED TO DO ANYTHING, OR PREPARED TO DO ANYTHING TO END YOUR LIFE?: NO
1. IN THE PAST MONTH, HAVE YOU WISHED YOU WERE DEAD OR WISHED YOU COULD GO TO SLEEP AND NOT WAKE UP?: NO
2. HAVE YOU ACTUALLY HAD ANY THOUGHTS OF KILLING YOURSELF?: NO

## 2023-12-23 ASSESSMENT — PAIN DESCRIPTION - DESCRIPTORS: DESCRIPTORS: ACHING

## 2023-12-23 ASSESSMENT — PAIN - FUNCTIONAL ASSESSMENT
PAIN_FUNCTIONAL_ASSESSMENT: 0-10

## 2023-12-23 ASSESSMENT — LIFESTYLE VARIABLES
EVER HAD A DRINK FIRST THING IN THE MORNING TO STEADY YOUR NERVES TO GET RID OF A HANGOVER: NO
HAVE YOU EVER FELT YOU SHOULD CUT DOWN ON YOUR DRINKING: NO
EVER FELT BAD OR GUILTY ABOUT YOUR DRINKING: NO
HAVE PEOPLE ANNOYED YOU BY CRITICIZING YOUR DRINKING: NO
REASON UNABLE TO ASSESS: NO

## 2023-12-23 ASSESSMENT — PAIN DESCRIPTION - ONSET: ONSET: ONGOING

## 2023-12-23 ASSESSMENT — PAIN DESCRIPTION - FREQUENCY: FREQUENCY: CONSTANT/CONTINUOUS

## 2023-12-23 ASSESSMENT — PAIN DESCRIPTION - PAIN TYPE: TYPE: ACUTE PAIN

## 2023-12-23 ASSESSMENT — PAIN DESCRIPTION - ORIENTATION: ORIENTATION: ANTERIOR

## 2023-12-23 ASSESSMENT — PAIN DESCRIPTION - PROGRESSION: CLINICAL_PROGRESSION: NOT CHANGED

## 2023-12-23 NOTE — CONSULTS
"Reason for consult: Blurry Vision OS    Pt is a 57 y/o M with POH NAION OD with residual inferior field deficits, PMH DMII, HTN, DLD, JERI presenting for ophthalmology evaluation with CC of headache and blurred vision x5 days. Pt reports sudden onset of mild left-sided frontal headache Monday AM with associated mildly blurred vision (\"like looking through a film\"). The headache is not temporal, but distributed in a more frontal pattern. Pt reports that the blurry vision has been stable without improvement or worsening since onset, and that the headache has been waxing and waning, responding to NSAIDs. He denies jaw claudication, new joint pain or proximal muscle weakness, unexplained fevers or weight loss. Also denies ocular pain, pain with eye movements, flashes/floaters, foreign body sensation, double vision, or sudden complete vision loss.    Past Medical History: as above  Family History: reviewed and not pertinent to chief complaint  Medications: please refer to medication reconciliation  Allergies: please refer to patient allergy list    OCULAR EXAMINATION:  Near VA cc: OD: 20/20, OS: 20/20  Pupils: 5>3 OU, no RAPD appreciated (prior RAPD OD noted at 10/2023 neuro-ophthalmology appt)  IOP: OD: 14, OS: 10  Motility: EOM full OU  Confrontation visual fields: Inferior constriction OD, otherwise full OU  Color Plates: 11/11 OU    ANTERIOR SEGMENT:  OD:  Lids/Lashes: normal anatomy and position  Conjunctiva: white and quiet  Cornea: clear  AC: deep and quiet  Iris: round and reactive  Lens: 1+ NS    OS:  Lids/Lashes: normal anatomy and position  Conjunctiva: white and quiet  Cornea: clear  AC: deep and quiet  Iris: round and reactive  Lens: 1+ NS    Phenylephrine 2.5% and Tropicamide 1% drops administered for dilated exam.     DFE:    OD:   C/D: 0.20 with previously noted superior sectoral atrophy  Vitreous: Clear  Macula: Good reflex  Vessels: Normal Caliber  Periphery: No tears/breaks/holes, no evidence of " hemorrhages    OS:  C/D: Grade 3-4 optic disc edema with major vessel obscuration  Vitreous: Clear  Macula: Good reflex  Vessels: Normal Caliber  Periphery: No tears/breaks/holes, no evidence of hemorrhages    Update 12/24/23:   Labs:   ESR: 5 (wnl)  CRP: 0.23 (wnl)  RPR: nonreactive  Toxoplasma IgG: nonreactive     Imaging:   MRI brain and orbits with and without contrast, with fat suppression, and MRV: Imaging personally reviewed and negative for optic nerve enhancement or mass lesion.     A&P:    #Sequential non-arteritic ischemic optic neuropathy, left eye   - Pt presenting for eval of HA and blurred vision OS x5 days  - Exam remarkable for grade 3-4 optic disc edema OS without edema OD  - Leading DDx is non-arteritic ischemic optic neuropathy. Low concern for GCA given negative ESR/CRP, and low concern for inflammatory, mass, or infectious lesion given negative MRI brain and orbits. Low concern for IIH as well given resolution of optic disc edema OD. No present indication for steroid initiation or lumbar puncture for further work-up of GCA or IIH, respectively.     Recommendations:   - No acute ophthalmic intervention  - ED return precautions provided including headache, scalp tenderness, jaw pain  - Will arrange outpatient follow-up with neuro-ophthalmology   - Will continue to follow remaining labs outpatient: NMO and MOG antibodies, T-spot, Syphilis screen (wnl), ACE, Lyme Ig panel, Bartonella and Toxoplasmosis IgG (wnl) and IgM      Ana Reddy MD  Ophthalmology PGY2    Note not final until signed by attending physician    Ophthalmology Adult Pager: [phone]  Ophthalmology Peds Pager: [phone]    For adult follow up appts, call [phone]  For pediatric follow up appts, call [phone]

## 2023-12-23 NOTE — ED PROVIDER NOTES
Disposition Note  [facility] CLINICAL DECISION  Patient: Paulo Roman  MRN: [id number]  : 1965  Date of Evaluation: 2023  ED Provider: David Cho PA-C      Limitations to history: None  Independent Historian: Yes  External Records Reviewed: Yes      Subjective:    Paulo Roman is a 58 y.o. male has undergone comprehensive diagnostic evaluation and therapeutic management in accordance with the CDU guidelines for optic neuropathy of the left eye. Based on Mr. De La Fuente`s clinical response and diagnostic information during this period of observation, it has been determined that the patient will be discharged.    The acute evaluation included:  Orders Placed This Encounter   Procedures    MR brain w and wo IV contrast    MR orbit w and wo IV contrast    MR venography intracranial w and wo IV contrast    Sedimentation rate, automated    C-reactive protein    T-Spot TB    Angiotensin converting enzyme    Lyme disease, PCR    Syphilis Screen with Reflex    Bartonella anitbody panel    Toxoplasma gondii antibody, IgM    Toxoplasma IgG    NMO/AQP4-IgG, Serum    MOG Assay, Serum    Adult diet Carb Controlled; 75 gram carb/meal, 45 gram Carb evening snack    Diet instructions to nursing: 15 grams oral carbohydrate for low blood glucose    Glucose 10-70 mg/dL & CONSCIOUS- Give 15 Grams of Carbohydrates and repeat until blood glucose level reaches 100 mg/dL or greater.    Notify provider (specify parameters)    Notify provider (specify parameters)    Notify provider (specify parameters)    Inpatient consult to ophthalmology    POCT GLUCOSE    POCT GLUCOSE    POCT GLUCOSE    POCT GLUCOSE    POCT GLUCOSE    Send to CDU    Initiate observation status         Placed in observation at:       Past History     Past Medical History:   Diagnosis Date    Other specified health status     Non-smoker     Past Surgical History:   Procedure Laterality Date    OTHER SURGICAL HISTORY  2019    Rotator  "cuff repair    OTHER SURGICAL HISTORY  05/20/2019    Knee surgery    OTHER SURGICAL HISTORY  11/17/2019    Colonoscopy    OTHER SURGICAL HISTORY  09/26/2021    Cardiac catheterization with stent placement    OTHER SURGICAL HISTORY  10/19/2022    Shoulder surgery     Social History     Socioeconomic History    Marital status: Single     Spouse name: Not on file    Number of children: Not on file    Years of education: Not on file    Highest education level: Not on file   Occupational History    Not on file   Tobacco Use    Smoking status: Never     Passive exposure: Never    Smokeless tobacco: Never   Vaping Use    Vaping Use: Never used   Substance and Sexual Activity    Alcohol use: Never    Drug use: Never    Sexual activity: Defer   Other Topics Concern    Not on file   Social History Narrative    Not on file     Social Determinants of Health     Financial Resource Strain: Not on file   Food Insecurity: Not on file   Transportation Needs: Not on file   Physical Activity: Not on file   Stress: Not on file   Social Connections: Not on file   Intimate Partner Violence: Not on file   Housing Stability: Not on file         Medications/Allergies     Discharge Medication List as of 12/24/2023  3:08 PM        CONTINUE these medications which have NOT CHANGED    Details   aspirin 81 mg chewable tablet Chew 1 tablet (81 mg) once daily., Historical Med      atorvastatin (Lipitor) 80 mg tablet TAKE ONE TABLET BY MOUTH AT BEDTIME, Starting Mon 11/27/2023, Normal      BD Marly 2nd Gen Pen Needle 32 gauge x 5/32\" needle USE 1 DAILY AS DIRECTED, Normal      blood-glucose sensor (Dexcom G6 Sensor) device USE TO CHECK BLOOD SUGAR FOUR TIMES DAILY, Normal      Dexcom G4 platinum  (Dexcom G6 ) misc USE TO CHECK BLOOD SUGAR 4 TIMES DAILY, Normal      Dexcom G4 platinum transmitter (Dexcom G6 Transmitter) device USE TO CHECK BLOOD SUGAR 4 TIMES DAILY, Normal      diclofenac (Voltaren) 50 mg EC tablet Take 1 tablet (50 " mg) by mouth every 8 hours if needed., Starting Mon 10/17/2022, Historical Med      Farxiga 10 mg TAKE ONE TABLET BY MOUTH EVERY DAY IN THE MORNING, Normal      fenofibrate (Triglide) 160 mg tablet Take 1 tablet (160 mg) by mouth once daily., Starting Mon 11/27/2023, Until Tue 11/26/2024, Normal      fluticasone (Flonase) 50 mcg/actuation nasal spray Administer 2 sprays into each nostril once daily., Starting Thu 4/27/2023, Normal      icosapent ethyL (Vascepa) 1 gram capsule TAKE TWO CAPSULES BY MOUTH TWO TIMES A DAY, Starting Mon 10/30/2023, Normal      insulin glargine (Basaglar KwikPen U-100 Insulin) 100 unit/mL (3 mL) pen INJECT 70 UNITS SUBCUTANEOUSLY EVERY MORNING, Normal      insulin lispro (HumaLOG U-100 Insulin) 100 unit/mL injection fill v-go patch with 100 units daily as directed, Normal      lisinopril 2.5 mg tablet Take 1 tablet (2.5 mg) by mouth once daily., Starting Thu 7/27/2023, Normal      methocarbamol (Robaxin) 500 mg tablet Take 1 tablet (500 mg) by mouth every 8 hours., Starting Mon 10/17/2022, Historical Med      metoprolol succinate XL (Toprol-XL) 25 mg 24 hr tablet Take 1 tablet (25 mg) by mouth once daily. Do not crush or chew., Starting Thu 7/27/2023, Normal      nitroglycerin (Nitrostat) 0.4 mg SL tablet Place 1 tablet (0.4 mg) under the tongue., Starting Tue 6/30/2020, Historical Med      omeprazole (PriLOSEC) 20 mg DR capsule Take 1 capsule (20 mg) by mouth., Historical Med      sub-q insulin device, 40 unit (V-GO 40) device USE DAILY AS DIRECTED, Normal      traZODone (Desyrel) 50 mg tablet Take 1 tablet (50 mg) by mouth once daily at bedtime., Historical Med           Allergies   Allergen Reactions    Ozempic [Semaglutide] Diarrhea    Tirzepatide Diarrhea         Review of Systems  All systems reviewed and otherwise negative, except as stated above in HPI.    Diagnostics reviewed by David Cho PA-C     Labs:  Results for orders placed or performed during the hospital encounter  of 12/23/23   Sedimentation rate, automated   Result Value Ref Range    Sedimentation Rate 5 0 - 20 mm/h   C-reactive protein   Result Value Ref Range    C-Reactive Protein 0.23 <1.00 mg/dL   Syphilis Screen with Reflex   Result Value Ref Range    Syphilis Total Ab Nonreactive Nonreactive   Toxoplasma IgG   Result Value Ref Range    Toxoplasma IgG Nonreactive Nonreactive   POCT GLUCOSE   Result Value Ref Range    POCT Glucose 186 (H) 74 - 99 mg/dL   POCT GLUCOSE   Result Value Ref Range    POCT Glucose 81 74 - 99 mg/dL   POCT GLUCOSE   Result Value Ref Range    POCT Glucose 73 (L) 74 - 99 mg/dL   POCT GLUCOSE   Result Value Ref Range    POCT Glucose 90 74 - 99 mg/dL   POCT GLUCOSE   Result Value Ref Range    POCT Glucose 129 (H) 74 - 99 mg/dL     Radiographs:  MR brain w and wo IV contrast   Final Result   1. No evidence of infarct, hemorrhage or other acute intracranial   abnormality. No pathologic intracranial enhancement is present.   2. Unremarkable MRI of the orbits. No pathologic enhancement, edema   or atrophy is present in the optic nerves bilaterally.   3. No evidence of occlusive dural venous thrombus. 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             I personally reviewed the images/study and I agree with the findings   as stated by Radiology resident Dr. Shoemaker.        Signed by: Zen Montez 12/24/2023 11:36 AM   Dictation workstation:   [id number]      MR orbit w and wo IV contrast   Final Result   1. No evidence of infarct, hemorrhage or other acute intracranial   abnormality. No pathologic intracranial enhancement is present.   2. Unremarkable MRI of the orbits. No pathologic enhancement, edema   or atrophy is present in the optic nerves bilaterally.   3. No evidence of occlusive dural venous  thrombus. 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             I personally reviewed the images/study and I agree with the findings   as stated by Radiology resident Dr. Shoemaker.        Signed by: Zen Montez 12/24/2023 11:36 AM   Dictation workstation:   [id number]      MR venography intracranial w and wo IV contrast   Final Result   1. No evidence of infarct, hemorrhage or other acute intracranial   abnormality. No pathologic intracranial enhancement is present.   2. Unremarkable MRI of the orbits. No pathologic enhancement, edema   or atrophy is present in the optic nerves bilaterally.   3. No evidence of occlusive dural venous thrombus. 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             I personally reviewed the images/study and I agree with the findings   as stated by Radiology resident Dr. Shoemaker.        Signed by: Zen Montez 12/24/2023 11:36 AM   Dictation workstation:   [id number]              Physical Exam     Visit Vitals  /70 (BP Location: Right arm, Patient Position: Lying)   Pulse 78   Temp 36.5 °C (97.7 °F) (Temporal)   Resp 16   SpO2 93%   Smoking Status Never       Physical exam  VS: As documented in the triage note and EMR flowsheet from this visit were reviewed.    General: Patient is AAOx3, appears well developed, well nourished, is a good historian, answers questions appropriately    HEENT: head normocephalic, atraumatic, PERRLA, EOMs intact, oropharynx without erythema or exudate, buccal mucosa intact without lesions, nose is patent bilateral    Neck: supple, full ROM,  "negative for lymphadenopathy, JVD, thyromegaly, tracheal deviation, nuchal rigidity    Pulmonary: Clear to auscultation bilaterally, No wheezing, rales, or rhonchi, no accessory muscle use, able to speak full clear sentences    Cardiac: Normal rate and rhythm, no murmurs, rubs or gallops    GI: soft, non-tender, non-distended, normoactive bowelsounds in all four quadrants, no masses or organomegaly, no guarding or CVA tenderness noted    Musculoskeletal: full weight bearing, BISWAS, no joint effusions, clubbing or edema noted    Skin: Warm, dry, intact, no lesions or rashes noted, turgor is good.    Neuro: patient follow commands, cranial nerves 2-12 grossly intact, motor strengths 5/5 upper and lower extremities, sensation are symmetrical. No focal deficits.    Psych: Appropriate mood and affect for situation      Consultants  1) Ophthalmology      Impression and Plan    In summary, Paulo Roman has been cared for according to the standard [facility] Clinical Decision Unit observation protocol for Visual disturbance. This extended period of observation was specifically required to determine the need for hospitalization. Prior to discharge from observation, the final physical exam is documented above.     Significant events during the course of observation based on the goals of the clinical problem list include:   1) Remained stable    Based on the patient's condition and test results, the patient will be discharged    Total length of observation was 26 hours. KIZZY Avina is the CDU disposition attending.      Discharge Diagnosis  Visual disturbance    Issues Requiring Follow-Up  See above    Discharge Meds     Your medication list        CONTINUE taking these medications        Instructions Last Dose Given Next Dose Due   BD Marly 2nd Gen Pen Needle 32 gauge x 5/32\" needle  Generic drug: pen needle, diabetic      USE 1 DAILY AS DIRECTED       Dexcom G6  misc  Generic drug: " Dexcom G4 platinum       USE TO CHECK BLOOD SUGAR 4 TIMES DAILY       Dexcom G6 Sensor device  Generic drug: blood-glucose sensor      USE TO CHECK BLOOD SUGAR FOUR TIMES DAILY       Dexcom G6 Transmitter device  Generic drug: Dexcom G4 platinum transmitter      USE TO CHECK BLOOD SUGAR 4 TIMES DAILY       V-GO 40 device  Generic drug: sub-q insulin device, 40 unit      USE DAILY AS DIRECTED              ASK your doctor about these medications        Instructions Last Dose Given Next Dose Due   aspirin 81 mg chewable tablet           atorvastatin 80 mg tablet  Commonly known as: Lipitor      TAKE ONE TABLET BY MOUTH AT BEDTIME       Basaglar KwikPen U-100 Insulin 100 unit/mL (3 mL) pen  Generic drug: insulin glargine      INJECT 70 UNITS SUBCUTANEOUSLY EVERY MORNING       diclofenac 50 mg EC tablet  Commonly known as: Voltaren           Farxiga 10 mg  Generic drug: dapagliflozin propanediol      TAKE ONE TABLET BY MOUTH EVERY DAY IN THE MORNING       fenofibrate 160 mg tablet  Commonly known as: Triglide      Take 1 tablet (160 mg) by mouth once daily.       fluticasone 50 mcg/actuation nasal spray  Commonly known as: Flonase      Administer 2 sprays into each nostril once daily.       HumaLOG U-100 Insulin 100 unit/mL injection  Generic drug: insulin lispro      fill v-go patch with 100 units daily as directed       icosapent ethyL 1 gram capsule  Commonly known as: Vascepa      TAKE TWO CAPSULES BY MOUTH TWO TIMES A DAY       lisinopril 2.5 mg tablet      Take 1 tablet (2.5 mg) by mouth once daily.       methocarbamol 500 mg tablet  Commonly known as: Robaxin           metoprolol succinate XL 25 mg 24 hr tablet  Commonly known as: Toprol-XL      Take 1 tablet (25 mg) by mouth once daily. Do not crush or chew.       nitroglycerin 0.4 mg SL tablet  Commonly known as: Nitrostat           omeprazole 20 mg DR capsule  Commonly known as: PriLOSEC           traZODone 50 mg tablet  Commonly known as: Desyrel                     Test Results Pending At Discharge  Pending Labs       Order Current Status    Angiotensin converting enzyme In process    Bartonella anitbody panel In process    Lyme disease, PCR In process    MOG Assay, Serum In process    NMO/AQP4-IgG, Serum In process    T-Spot TB In process    Toxoplasma gondii antibody, IgM In process            Hospital Course   Mr. Roman was admitted to the clinical decision unit for visual disturbance of the left eye.  He was initially seen at [facility] where he had a CT of the head that revealed no acute intracranial findings prior to transfer to [facility] ED for ophthalmology consult.  Patient was evaluated by ophthalmology while at [facility] ED.  Ophthalmology recommended additional laboratory studies which were sent to the lab with results pending.  Additionally, MRI of the brain and orbit with and without contrast was obtained and reviewed and negative for acute intracranial abnormalities.  MR venogram with and without also negative for acute findings.  Mr. Roman has remained clinically, hemodynamically and neurologically intact during his CDU admission.  Ophthalmology reevaluated Mr. Roman this afternoon and deemed patient stable and suitable for outpatient management.  Patient will follow-up with his previously scheduled neuro-ophthalmology appointment.  I have instructed the patient to return to the emergency department with new or worsening symptoms or for any other concerns.  Plan of care discussed with the patient and he verbalized understanding and is in agreement and was discharged home in stable condition.  No home-going medications per ophthalmology.    Outpatient Follow-Up  Future Appointments   Date Time Provider Department Center   12/27/2023 10:45 AM Ez Maier MD [id number] [facility]   1/2/2024  3:45 PM Edgar Torres MD PhD [id number] [facility]   1/10/2024  1:15 PM Edgar Seals DO [id number] [facility]   4/26/2024  1:15 PM Edgar Torres MD PhD [id number] [facility]          David Cho PA-C               History and Physical  [facility] CLINICAL DECISION  Patient: Paulo Roman  MRN: [id number]  : 1965  Date of Evaluation: 2023  ED Provider: David Cho PA-C      Limitations to history: None  Independent Historian: Yes  External Records Reviewed: Yes      Patient History:  Paulo Roman is a 58 y.o. 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  Patient states that he began to experience blurred vision from his left eye 5 days ago.  He describes with acute onset of his symptoms.  He reports mild pain headache to the left forehead area that has occurred intermittently since the onset of his symptoms.  He denies left eye pain, flashes of light, floaters or foreign body sensation.  He does not wear contact lenses and denies any recent trauma.  He was initially seen at [facility] where he had a CT of his head performed prior to transfer to [facility] ED for ophthalmology consult.  Review CT head revealed no acute findings.  He denies dizziness chest pain shortness of breath fever chills nausea vomiting abdominal pain or any skin rashes.    The acute evaluation included:  Orders Placed This Encounter   Procedures    MR brain w and wo IV contrast    MR orbit w and wo IV contrast    MR venography intracranial w and wo IV contrast    Sedimentation rate, automated    C-reactive protein    T-Spot TB    Angiotensin converting enzyme    Lyme disease, PCR    Syphilis Screen with Reflex    Bartonella anitbody panel    Toxoplasma gondii antibody, IgM    Toxoplasma IgG    Adult diet Carb Controlled; 75 gram carb/meal, 45 gram Carb evening snack    Inpatient consult to ophthalmology    Send to CDU       I reviewed the below labs and imaging as ordered by the ED  provider:  MR brain w and wo IV contrast    (Results Pending)   MR orbit w and wo IV contrast    (Results Pending)   MR venography intracranial w and wo IV contrast    (Results Pending)       Labs Reviewed   SEDIMENTATION RATE, AUTOMATED - Normal       Result Value    Sedimentation Rate 5     C-REACTIVE PROTEIN - Normal    C-Reactive Protein 0.23     T-SPOT TB   ANGIOTENSIN CONVERTING ENZYME   LYME DISEASE (BORRELIA BURGDORFERI), PCR   SYPHILIS SCREENING WITH REFLEX   BARTONELLA ANITBODY PANEL   TOXOPLASMA GONDII ANTIBODY, IGM   TOXOPLASMA IGG           After discussion with the ED provider, a decision was made to admit the patient to the Clinical Decision Unit.    Upon admission to the Clinical Decision Unit, Mr. Roman is alert and oriented x 4 and appears in no acute distress.  Patient was evaluated by ophthalmology in the ED and recommendations for obtaining a battery of laboratory studies and MRI`s of the brain, orbits and venogram were recommended and pending.  Vital signs are stable.  Past History     Past Medical History:   Diagnosis Date    Other specified health status     Non-smoker     Past Surgical History:   Procedure Laterality Date    OTHER SURGICAL HISTORY  05/20/2019    Rotator cuff repair    OTHER SURGICAL HISTORY  05/20/2019    Knee surgery    OTHER SURGICAL HISTORY  11/17/2019    Colonoscopy    OTHER SURGICAL HISTORY  09/26/2021    Cardiac catheterization with stent placement    OTHER SURGICAL HISTORY  10/19/2022    Shoulder surgery     Social History     Socioeconomic History    Marital status: Single     Spouse name: Not on file    Number of children: Not on file    Years of education: Not on file    Highest education level: Not on file   Occupational History    Not on file   Tobacco Use    Smoking status: Never     Passive exposure: Never    Smokeless tobacco: Never   Vaping Use    Vaping Use: Never used   Substance and Sexual Activity    Alcohol use: Never    Drug use: Never    Sexual  "activity: Defer   Other Topics Concern    Not on file   Social History Narrative    Not on file     Social Determinants of Health     Financial Resource Strain: Not on file   Food Insecurity: Not on file   Transportation Needs: Not on file   Physical Activity: Not on file   Stress: Not on file   Social Connections: Not on file   Intimate Partner Violence: Not on file   Housing Stability: Not on file         Medications/Allergies     Previous Medications    ASPIRIN 81 MG CHEWABLE TABLET    Chew 1 tablet (81 mg) once daily.    ATORVASTATIN (LIPITOR) 80 MG TABLET    TAKE ONE TABLET BY MOUTH AT BEDTIME    BD PORTILLO 2ND GEN PEN NEEDLE 32 GAUGE X 5/32\" NEEDLE    USE 1 DAILY AS DIRECTED    BLOOD-GLUCOSE SENSOR (DEXCOM G6 SENSOR) DEVICE    USE TO CHECK BLOOD SUGAR FOUR TIMES DAILY    DEXCOM G4 PLATINUM  (DEXCOM G6 ) MISC    USE TO CHECK BLOOD SUGAR 4 TIMES DAILY    DEXCOM G4 PLATINUM TRANSMITTER (DEXCOM G6 TRANSMITTER) DEVICE    USE TO CHECK BLOOD SUGAR 4 TIMES DAILY    DICLOFENAC (VOLTAREN) 50 MG EC TABLET    Take 1 tablet (50 mg) by mouth every 8 hours if needed.    FARXIGA 10 MG    TAKE ONE TABLET BY MOUTH EVERY DAY IN THE MORNING    FENOFIBRATE (TRIGLIDE) 160 MG TABLET    Take 1 tablet (160 mg) by mouth once daily.    FLUTICASONE (FLONASE) 50 MCG/ACTUATION NASAL SPRAY    Administer 2 sprays into each nostril once daily.    ICOSAPENT ETHYL (VASCEPA) 1 GRAM CAPSULE    TAKE TWO CAPSULES BY MOUTH TWO TIMES A DAY    INSULIN GLARGINE (BASAGLAR KWIKPEN U-100 INSULIN) 100 UNIT/ML (3 ML) PEN    INJECT 70 UNITS SUBCUTANEOUSLY EVERY MORNING    INSULIN LISPRO (HUMALOG U-100 INSULIN) 100 UNIT/ML INJECTION    fill v-go patch with 100 units daily as directed    LISINOPRIL 2.5 MG TABLET    Take 1 tablet (2.5 mg) by mouth once daily.    METHOCARBAMOL (ROBAXIN) 500 MG TABLET    Take 1 tablet (500 mg) by mouth every 8 hours.    METOPROLOL SUCCINATE XL (TOPROL-XL) 25 MG 24 HR TABLET    Take 1 tablet (25 mg) by mouth once " daily. Do not crush or chew.    NITROGLYCERIN (NITROSTAT) 0.4 MG SL TABLET    Place 1 tablet (0.4 mg) under the tongue.    OMEPRAZOLE (PRILOSEC) 20 MG DR CAPSULE    Take 1 capsule (20 mg) by mouth.    SUB-Q INSULIN DEVICE, 40 UNIT (V-GO 40) DEVICE    USE DAILY AS DIRECTED    TRAZODONE (DESYREL) 50 MG TABLET    Take 1 tablet (50 mg) by mouth once daily at bedtime.     Allergies   Allergen Reactions    Ozempic [Semaglutide] Diarrhea    Tirzepatide Diarrhea         Review of Systems  All systems reviewed and otherwise negative, except as stated above in HPI.      Physical Exam     Visit Vitals  /82   Pulse 83   Temp 36.3 °C (97.3 °F)   Resp 18   SpO2 97%   Smoking Status Never         Physical exam    VS: As documented in the triage note and EMR flowsheet from this visit were reviewed.    General: Patient is AAOx3, appears well developed, well nourished, is a good historian, answers questions appropriately    HEENT: head normocephalic, atraumatic, PERRLA, EOMs intact, oropharynx without erythema or exudate, buccal mucosa intact without lesions, nose is patent bilateral    Neck: supple, full ROM, negative for lymphadenopathy, JVD, thyromegaly, tracheal deviation, nuchal rigidity    Pulmonary: Clear to auscultation bilaterally, No wheezing, rales, or rhonchi, no accessory muscle use, able to speak full clear sentences    Cardiac: Normal rate and rhythm, no murmurs, rubs or gallops    GI: soft, non-tender, non-distended, normoactive bowelsounds in all four quadrants, no masses or organomegaly, no guarding or CVA tenderness noted    Musculoskeletal: full weight bearing, BISWAS, no joint effusions, clubbing or edema noted    Skin: Warm, dry, intact, no lesions or rashes noted, turgor is good.    Neuro: patient follow commands, cranial nerves 2-12 grossly intact, motor strengths 5/5 upper and lower extremities, sensation are symmetrical. No focal deficits.    Psych: Appropriate mood and affect for  situation      Consultants  1) ophthalmology      Impression and Plan  In summary, Paulo Roman is admitted to the [facility] Clinical Decision Unit for vision changes. KIZZY Avina is the CDU admission attending.    This patient has been risk-stratified based on available history, physical exam, and related study findings. Admission to the observation status for further diagnosis/treatment/monitoring of vision changes is warranted clinically. This extended period of observation is specifically required to determine the need for hospitalization.     The goals of this admission based on the patient’s clinical problem list are:  1) Stable VS    Assessment/ Plan  1) Vision change, left eye  - Labs  - Mri`s brain, orbit, venogram  -Ophthalmology to reevaluate    When met, appropriate disposition will be arranged.        David Cho PA-C  12/23/23 1822       David Cho PA-C  12/24/23 1533

## 2023-12-23 NOTE — ED PROVIDER NOTES
HPI   Chief Complaint   Patient presents with    Eye Problem       Patient is a 58-year-old male who presents today for ophthalmology evaluation, ophthalmology is ready to see him as soon as he arrives to the ED, in short patient had loss of peripheral vision several months ago in the right eye, he did start having some blurred vision in the left eye with intermittent headaches, was referred here for ophthalmology evaluation.                          Schneider Coma Scale Score: 15         NIH Stroke Scale: 0          Patient History   Past Medical History:   Diagnosis Date    Other specified health status     Non-smoker     Past Surgical History:   Procedure Laterality Date    OTHER SURGICAL HISTORY  05/20/2019    Rotator cuff repair    OTHER SURGICAL HISTORY  05/20/2019    Knee surgery    OTHER SURGICAL HISTORY  11/17/2019    Colonoscopy    OTHER SURGICAL HISTORY  09/26/2021    Cardiac catheterization with stent placement    OTHER SURGICAL HISTORY  10/19/2022    Shoulder surgery     Family History   Problem Relation Name Age of Onset    Diabetes Mother      Hypertension Mother      Diabetes Father      Hypertension Father      Diabetes Daughter      Diabetes Other Sibling      Social History     Tobacco Use    Smoking status: Never     Passive exposure: Never    Smokeless tobacco: Never   Vaping Use    Vaping Use: Never used   Substance Use Topics    Alcohol use: Never    Drug use: Never       Physical Exam   ED Triage Vitals [12/23/23 1313]   Temp Heart Rate Resp BP   36.3 °C (97.3 °F) 83 18 145/82      SpO2 Temp src Heart Rate Source Patient Position   97 % -- -- --      BP Location FiO2 (%)     -- --       Physical Exam  Vitals and nursing note reviewed.   Constitutional:       General: He is not in acute distress.     Appearance: Normal appearance. He is not toxic-appearing.   HENT:      Head: Normocephalic and atraumatic.      Nose: Nose normal.   Eyes:      Extraocular Movements: Extraocular movements intact.    Cardiovascular:      Rate and Rhythm: Normal rate and regular rhythm.   Pulmonary:      Effort: Pulmonary effort is normal.   Abdominal:      Palpations: Abdomen is soft.   Musculoskeletal:         General: Normal range of motion.      Cervical back: Normal range of motion and neck supple.   Skin:     General: Skin is warm and dry.   Neurological:      General: No focal deficit present.      Mental Status: He is alert.   Psychiatric:         Mood and Affect: Mood normal.         Thought Content: Thought content normal.       MR brain w and wo IV contrast    (Results Pending)   MR orbit w and wo IV contrast    (Results Pending)   MR venography intracranial w and wo IV contrast    (Results Pending)     Labs Reviewed   SEDIMENTATION RATE, AUTOMATED - Normal       Result Value    Sedimentation Rate 5     C-REACTIVE PROTEIN - Normal    C-Reactive Protein 0.23     T-SPOT TB   ANGIOTENSIN CONVERTING ENZYME   LYME DISEASE (BORRELIA BURGDORFERI), PCR   SYPHILIS SCREENING WITH REFLEX   BARTONELLA ANITBODY PANEL   TOXOPLASMA GONDII ANTIBODY, IGM   TOXOPLASMA IGG         ED Course & MDM   Diagnoses as of 12/23/23 1721   Visual disturbance       Medical Decision Making  MDM: Patient is a 58-year-old male who presented to see ophthalmology, ophthalmology was already down here waiting for him, final disposition per the recommendations. Ophthalmology recommended MRIs, labs that were ordered by me and confirmed by ophthalmology to be correct.  Plan will be to admit patient to CDU for optic neuritis workup, ophthalmology will follow in the morning after patient gets MRI in the morning.        Procedure  Procedures     Gabbie Funk PA-C  12/23/23 1721

## 2023-12-23 NOTE — ED TRIAGE NOTES
Sent from [facility] for ophtho consult. Hx of right eye peripheral vision loss in summer of this yr. Pt states he now has blurred vision in left eye with intermittent HA that started during this past week. Denies pain, dizziness.

## 2023-12-23 NOTE — ED PROVIDER NOTES
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  Patient tells me that he has been experiencing left eye redness since Monday.  Patient denies any other neurological symptoms.  Patient tells me that he did have a headache on Monday and Thursday.  Patient tells me that he did talk to his neuro-ophthalmologist and was told to present to the emergency department if his symptoms persist.  Patient tells me that his blood sugars have been well-controlled.  Patient tells me he was 145 this morning and that this is high for him.  Patient denies any chest pain, shortness of breath, abdominal pain, nausea vomiting, weakness, constipation, urinary symptoms, numbness or tingling, dizziness or lightheadedness.       History provided by:  Patient   used: No                        Quoc Coma Scale Score: 15                  Patient History   Past Medical History:   Diagnosis Date    Other specified health status     Non-smoker     Past Surgical History:   Procedure Laterality Date    OTHER SURGICAL HISTORY  05/20/2019    Rotator cuff repair    OTHER SURGICAL HISTORY  05/20/2019    Knee surgery    OTHER SURGICAL HISTORY  11/17/2019    Colonoscopy    OTHER SURGICAL HISTORY  09/26/2021    Cardiac catheterization with stent placement    OTHER SURGICAL HISTORY  10/19/2022    Shoulder surgery     Family History   Problem Relation Name Age of Onset    Diabetes Mother      Hypertension Mother      Diabetes Father      Hypertension Father      Diabetes Daughter      Diabetes Other Sibling      Social History     Tobacco Use    Smoking status: Never     Passive  exposure: Never    Smokeless tobacco: Never   Vaping Use    Vaping Use: Never used   Substance Use Topics    Alcohol use: Never    Drug use: Never       Physical Exam   ED Triage Vitals [12/23/23 0948]   Temp Heart Rate Resp BP   36.2 °C (97.2 °F) 86 18 (!) 180/92      SpO2 Temp Source Heart Rate Source Patient Position   97 % Tympanic Monitor Sitting      BP Location FiO2 (%)     -- --       Physical Exam  Vitals and nursing note reviewed.   Constitutional:       Appearance: Normal appearance.   HENT:      Head: Normocephalic and atraumatic.   Eyes:      General: Lids are normal. Lids are everted, no foreign bodies appreciated. Gaze aligned appropriately.      Extraocular Movements: Extraocular movements intact.   Cardiovascular:      Rate and Rhythm: Normal rate and regular rhythm.      Pulses: Normal pulses.   Pulmonary:      Effort: Pulmonary effort is normal.      Breath sounds: Normal breath sounds.   Skin:     Capillary Refill: Capillary refill takes less than 2 seconds.   Neurological:      General: No focal deficit present.      Mental Status: He is alert and oriented to person, place, and time.      GCS: GCS eye subscore is 4. GCS verbal subscore is 5. GCS motor subscore is 6.      Cranial Nerves: Cranial nerves 2-12 are intact.      Sensory: Sensation is intact.      Motor: Motor function is intact.      Coordination: Coordination is intact.      Gait: Gait is intact.   Psychiatric:         Mood and Affect: Mood normal.         Behavior: Behavior normal.         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  Patient tells me that he  has been experiencing left eye redness since Monday.  Patient denies any other neurological symptoms.  Patient tells me that he did have a headache on Monday and Thursday.  Patient tells me that he did talk to his neuro-ophthalmologist and was told to present to the emergency department if his symptoms persist.  Patient tells me that his blood sugars have been well-controlled.  Patient tells me he was 145 this morning and that this is high for him.  Patient denies any chest pain, shortness of breath, abdominal pain, nausea vomiting, weakness, constipation, urinary symptoms, numbness or tingling, dizziness or lightheadedness.     Patient seen examined emergency department; patient is healthy and nontoxic in appearance not appear to be acute distress.  Clinical exam reveals that patient is neurologically intact.,  Heart regular rate and rhythm no murmur noted.  Lung sounds clear bilateral without any adventitious noise.  NIH score is 1 given patient's absence of right peripheral vision.  However this is patient's baseline.  Will obtain CT head as a basic laboratory workup.    Patient's head CT is negative.  I did discuss patient with Dr. Evans with ophthalmology downtown.  Patient to be transferred to [facility] ED for further evaluation.  Ophthalmology will meet him in the ER.  I did discuss this with patient.  Patient's dad is here and will drive him downtown.  I did give him directions to the emergency department.  All patient's questions and concerns were addressed prior to transfer.  Patient transferred in stable condition.      Labs Reviewed   COMPREHENSIVE METABOLIC PANEL - Abnormal       Result Value    Glucose 104 (*)     Sodium 142      Potassium 4.3      Chloride 107      Bicarbonate 26      Anion Gap 13      Urea Nitrogen 19      Creatinine 1.12      eGFR 77      Calcium 8.9      Albumin 4.6      Alkaline Phosphatase 47      Total Protein 7.2      AST 19      Bilirubin, Total 0.5      ALT 23     CBC WITH AUTO  DIFFERENTIAL - Abnormal    WBC 4.3 (*)     nRBC 0.0      RBC 5.41      Hemoglobin 16.2      Hematocrit 47.5      MCV 88      MCH 29.9      MCHC 34.1      RDW 12.2      Platelets 167      Neutrophils % 51.9      Immature Granulocytes %, Automated 0.2      Lymphocytes % 36.9      Monocytes % 7.7      Eosinophils % 2.6      Basophils % 0.7      Neutrophils Absolute 2.22      Immature Granulocytes Absolute, Automated 0.01      Lymphocytes Absolute 1.58      Monocytes Absolute 0.33      Eosinophils Absolute 0.11      Basophils Absolute 0.03     TROPONIN I, HIGH SENSITIVITY - Normal    Troponin I, High Sensitivity 3      Narrative:     Less than 99th percentile of normal range cutoff-  Female and children under 18 years old <14 ng/L; Male <21 ng/L: Negative  Repeat testing should be performed if clinically indicated.     Female and children under 18 years old 14-50 ng/L; Male 21-50 ng/L:  Consistent with possible cardiac damage and possible increased clinical   risk. Serial measurements may help to assess extent of myocardial damage.     >50 ng/L: Consistent with cardiac damage, increased clinical risk and  myocardial infarction. Serial measurements may help assess extent of   myocardial damage.      NOTE: Children less than 1 year old may have higher baseline troponin   levels and results should be interpreted in conjunction with the overall   clinical context.     NOTE: Troponin I testing is performed using a different   testing methodology at [facility] than at other   [facility] hospitals. Direct result comparisons should only   be made within the same method.   PROTIME-INR - Normal    Protime 11.5      INR 1.0     MAGNESIUM - Normal    Magnesium 2.06     URINALYSIS WITH REFLEX MICROSCOPIC       CT head wo IV contrast   Final Result   No acute findings.        Signed by: Smith Salcido 12/23/2023 10:57 AM   Dictation workstation:   [id number]            Procedure  Procedures     Анна Boo,  APRN-CNP  12/23/23 1123

## 2023-12-24 ENCOUNTER — APPOINTMENT (OUTPATIENT)
Dept: RADIOLOGY | Facility: HOSPITAL | Age: 58
End: 2023-12-24
Payer: COMMERCIAL

## 2023-12-24 VITALS
HEART RATE: 78 BPM | DIASTOLIC BLOOD PRESSURE: 70 MMHG | SYSTOLIC BLOOD PRESSURE: 119 MMHG | OXYGEN SATURATION: 93 % | TEMPERATURE: 97.7 F | RESPIRATION RATE: 16 BRPM

## 2023-12-24 LAB
EST. AVERAGE GLUCOSE BLD GHB EST-MCNC: 163 MG/DL
GLUCOSE BLD MANUAL STRIP-MCNC: 129 MG/DL (ref 74–99)
GLUCOSE BLD MANUAL STRIP-MCNC: 73 MG/DL (ref 74–99)
GLUCOSE BLD MANUAL STRIP-MCNC: 90 MG/DL (ref 74–99)
HBA1C MFR BLD: 7.3 %
T PALLIDUM AB SER QL: NONREACTIVE

## 2023-12-24 PROCEDURE — G0378 HOSPITAL OBSERVATION PER HR: HCPCS

## 2023-12-24 PROCEDURE — 70553 MRI BRAIN STEM W/O & W/DYE: CPT | Performed by: STUDENT IN AN ORGANIZED HEALTH CARE EDUCATION/TRAINING PROGRAM

## 2023-12-24 PROCEDURE — 2550000001 HC RX 255 CONTRASTS: Performed by: EMERGENCY MEDICINE

## 2023-12-24 PROCEDURE — A9575 INJ GADOTERATE MEGLUMI 0.1ML: HCPCS | Performed by: EMERGENCY MEDICINE

## 2023-12-24 PROCEDURE — 70546 MR ANGIOGRAPH HEAD W/O&W/DYE: CPT | Performed by: STUDENT IN AN ORGANIZED HEALTH CARE EDUCATION/TRAINING PROGRAM

## 2023-12-24 PROCEDURE — 2500000001 HC RX 250 WO HCPCS SELF ADMINISTERED DRUGS (ALT 637 FOR MEDICARE OP)

## 2023-12-24 PROCEDURE — 2500000004 HC RX 250 GENERAL PHARMACY W/ HCPCS (ALT 636 FOR OP/ED)

## 2023-12-24 PROCEDURE — 70543 MRI ORBT/FAC/NCK W/O &W/DYE: CPT

## 2023-12-24 PROCEDURE — 99238 HOSP IP/OBS DSCHRG MGMT 30/<: CPT | Performed by: PHYSICIAN ASSISTANT

## 2023-12-24 PROCEDURE — 70546 MR ANGIOGRAPH HEAD W/O&W/DYE: CPT

## 2023-12-24 PROCEDURE — 70553 MRI BRAIN STEM W/O & W/DYE: CPT

## 2023-12-24 PROCEDURE — 82947 ASSAY GLUCOSE BLOOD QUANT: CPT

## 2023-12-24 PROCEDURE — 70543 MRI ORBT/FAC/NCK W/O &W/DYE: CPT | Performed by: STUDENT IN AN ORGANIZED HEALTH CARE EDUCATION/TRAINING PROGRAM

## 2023-12-24 PROCEDURE — 2500000001 HC RX 250 WO HCPCS SELF ADMINISTERED DRUGS (ALT 637 FOR MEDICARE OP): Performed by: PHYSICIAN ASSISTANT

## 2023-12-24 RX ORDER — GADOTERATE MEGLUMINE 376.9 MG/ML
20 INJECTION INTRAVENOUS
Status: COMPLETED | OUTPATIENT
Start: 2023-12-24 | End: 2023-12-24

## 2023-12-24 RX ADMIN — ASPIRIN 81 MG CHEWABLE TABLET 81 MG: 81 TABLET CHEWABLE at 08:46

## 2023-12-24 RX ADMIN — METOPROLOL SUCCINATE 25 MG: 25 TABLET, EXTENDED RELEASE ORAL at 08:46

## 2023-12-24 RX ADMIN — TRAZODONE HYDROCHLORIDE 50 MG: 50 TABLET ORAL at 00:00

## 2023-12-24 RX ADMIN — LISINOPRIL 2.5 MG: 5 TABLET ORAL at 08:45

## 2023-12-24 RX ADMIN — DAPAGLIFLOZIN 10 MG: 10 TABLET, FILM COATED ORAL at 07:00

## 2023-12-24 RX ADMIN — GADOTERATE MEGLUMINE 20 ML: 376.9 INJECTION INTRAVENOUS at 10:25

## 2023-12-24 RX ADMIN — PANTOPRAZOLE SODIUM 40 MG: 40 TABLET, DELAYED RELEASE ORAL at 07:00

## 2023-12-24 ASSESSMENT — PAIN SCALES - GENERAL: PAINLEVEL_OUTOF10: 0 - NO PAIN

## 2023-12-24 ASSESSMENT — PAIN - FUNCTIONAL ASSESSMENT: PAIN_FUNCTIONAL_ASSESSMENT: 0-10

## 2023-12-24 NOTE — PROGRESS NOTES
Subjective  Paulo Roman is a 58 y.o. male on day 1 of admission presenting with Visual disturbance.   Serial assessments of clinical progress include:  1.)  Patient reports headache, Tylenol given once  2.)  Patient remained hemodynamically stable and neurologically intact during the rest of the night.        Objective  VS reviewed  Physical Exam:  GENERAL:  The patient appears nourished and normally developed. Vital signs as documented.     Appearance: Alert, oriented, cooperative, in no acute distress. Well nourished & well hydrated.    HEENT:  Head normocephalic, atraumatic, EOMs intact, PERRLA, Mucous membranes moist. Nares patent without copious rhinorrhea.  Oropharynx moist and clear.  Uvula midline.    Neck: Supple.  No meningismus.  No swelling.  Trachea midline. No lymphadenopathy.    Pulmonary:  Lungs are clear to auscultation, without any respiratory distress.  No wheezing, crackles or rales.  No hypoxia or dyspnea.  Able to speak full sentences, no accessory muscle use.    Cardia:   Regular rate and rhythm. No murmurs, rubs or gallops.  No JVD.    GI:  Soft, non-distended, non-tender, BS positive x 4 quadrants, No rebound or guarding, no peritoneal signs, no CVA tenderness, no masses or organomegaly.    Musculoskeletal: Symmetrical muscle bulk.  No peripheral edema.  Pulses intact distal.  Able to walk.    Integumentary: Warm, dry and intact.  No pallor or jaundice.  No lesions, rashes or open sores.    NEURO:  No obvious neurological deficits, normal sensation and strength bilaterally.  Speech clear and fluent.  Able to follow commands, CN 2-12 intact.    Psych: Appropriate mood and affect.      Relevant Results  Results for orders placed or performed during the hospital encounter of 12/23/23 (from the past 24 hour(s))   Sedimentation rate, automated   Result Value Ref Range    Sedimentation Rate 5 0 - 20 mm/h   C-reactive protein   Result Value Ref Range    C-Reactive Protein 0.23 <1.00 mg/dL    Toxoplasma IgG   Result Value Ref Range    Toxoplasma IgG Nonreactive Nonreactive   POCT GLUCOSE   Result Value Ref Range    POCT Glucose 186 (H) 74 - 99 mg/dL       Imaging Results  ECG 12 Lead    Result Date: 12/23/2023  Normal sinus rhythm Normal ECG When compared with ECG of 30-JUN-2020 13:27, No significant change was found    CT head wo IV contrast    Result Date: 12/23/2023  Interpreted By:  Smith Salcido, STUDY: CT HEAD WO IV CONTRAST;  12/23/2023 10:21 am   INDICATION: Signs/Symptoms:blurred vision.   COMPARISON: No prior examination available for comparison. If a prior examination becomes available, this examination will be compared to the prior study. Addendum report will be issued.   ACCESSION NUMBER(S): [id number]   ORDERING CLINICIAN: MICHELLE NICE   TECHNIQUE: Noncontrast axial CT scan of head was performed. Angled reformats in brain and bone windows were generated. The images were reviewed in bone, brain, blood and soft tissue windows.   FINDINGS: No acute edema. No acute hemorrhage. No mass effect. Ventricular system is normal for age. No extra-axial fluid collections. Orbits are normal. Paranasal sinuses are clear.   There is prominence to the right transverse sinus. I do not see findings to suggest subdural hematoma. Has this patient had recent trauma?       No acute findings.   Signed by: Smith Salcido 12/23/2023 10:57 AM Dictation workstation:   [id number]    XR abdomen 3+ views    Result Date: 11/29/2023  Interpreted By:  Edwardo Walker, STUDY: XR ABDOMEN 3+ VIEWS; ;  11/29/2023 3:47 pm   INDICATION: Signs/Symptoms:abdominal pain, nausea and vomiting.   COMPARISON: None.   ACCESSION NUMBER(S): [id number]   ORDERING CLINICIAN: SUKHJINDER COLBERT   TECHNIQUE: Supine and upright AP views of the abdomen and a single PA view of the chest were obtained.   FINDINGS: There is no focal infiltrate, pleural effusion or pneumothorax identified. The cardiac silhouette is within normal limits for  size. There is a nonobstructive bowel gas pattern present.  No free intraperitoneal air or air-fluid levels are identified. No abnormal calcifications are seen over the abdomen. Mild degenerative changes are seen throughout the visualized spine.       1.  No focal infiltrate or pneumothorax. 2. Nonobstructive bowel gas pattern.   MACRO: None   Signed by: Edwardo Walker 11/29/2023 3:51 PM Dictation workstation:   [id number]      Medications:  atorvastatin, 80 mg, oral, Nightly  fenofibrate, 160 mg, oral, Daily  insulin lispro, 0-10 Units, subcutaneous, TID with meals  lisinopril, 2.5 mg, oral, Daily  metoprolol succinate XL, 25 mg, oral, Daily       PRN medications: dextrose 10 % in water (D10W), dextrose, glucagon     Assessment/Plan     Paulo Romna continues to be managed in accordance with the CDU clinical guidelines for vision changes to left eye. An update of their clinical problem list included:  1.)  Left eye vision changes  -MRI brain, orbit and venogram pending  -Ophthalmology recommended Recommend NMO and MOG antibodies, T-spot, Syphilis screen, ACE, Lyme Ig panel, Bartonella and Toxoplasmosis IgG and IgM, labs pending  -Ophthalmology to continue following    2.)  Headache  -Acetaminophen 650 mg once    We will observe the patient for the following endpoints:   1.)  Stable vitals  2.)  Symptomatic improvement  3.)  No worsening of symptoms  4.)  Clear MRI of brain, orbit and venogram    When met, appropriate disposition will be arranged.    Paulo Roman has been admitted to the CDU for 9 hours. I spent 25 minutes in the professional and overall care of this patient   @OBS@    Gayatri Tamayo, APRN-CNP  [facility]  Emergency Department  Extension [id number]

## 2023-12-25 LAB
ATRIAL RATE: 83 BPM
P AXIS: 48 DEGREES
P OFFSET: 200 MS
P ONSET: 146 MS
PR INTERVAL: 162 MS
Q ONSET: 227 MS
QRS COUNT: 14 BEATS
QRS DURATION: 82 MS
QT INTERVAL: 362 MS
QTC CALCULATION(BAZETT): 425 MS
QTC FREDERICIA: 403 MS
R AXIS: 55 DEGREES
T AXIS: 21 DEGREES
T OFFSET: 408 MS
VENTRICULAR RATE: 83 BPM

## 2023-12-27 ENCOUNTER — OFFICE VISIT (OUTPATIENT)
Dept: PRIMARY CARE | Facility: CLINIC | Age: 58
End: 2023-12-27
Payer: COMMERCIAL

## 2023-12-27 VITALS
DIASTOLIC BLOOD PRESSURE: 80 MMHG | RESPIRATION RATE: 18 BRPM | OXYGEN SATURATION: 96 % | HEIGHT: 70 IN | BODY MASS INDEX: 34.5 KG/M2 | WEIGHT: 241 LBS | TEMPERATURE: 97.3 F | SYSTOLIC BLOOD PRESSURE: 120 MMHG | HEART RATE: 78 BPM

## 2023-12-27 DIAGNOSIS — R05.8 ACE-INHIBITOR COUGH: ICD-10-CM

## 2023-12-27 DIAGNOSIS — E66.09 CLASS 1 OBESITY DUE TO EXCESS CALORIES WITH SERIOUS COMORBIDITY AND BODY MASS INDEX (BMI) OF 34.0 TO 34.9 IN ADULT: ICD-10-CM

## 2023-12-27 DIAGNOSIS — R05.3 PERSISTENT COUGH: ICD-10-CM

## 2023-12-27 DIAGNOSIS — E11.9 TYPE 2 DIABETES MELLITUS WITHOUT COMPLICATION, WITH LONG-TERM CURRENT USE OF INSULIN (MULTI): ICD-10-CM

## 2023-12-27 DIAGNOSIS — Z79.4 TYPE 2 DIABETES MELLITUS WITHOUT COMPLICATION, WITH LONG-TERM CURRENT USE OF INSULIN (MULTI): ICD-10-CM

## 2023-12-27 DIAGNOSIS — T46.4X5A ACE-INHIBITOR COUGH: ICD-10-CM

## 2023-12-27 DIAGNOSIS — F33.1 MODERATE EPISODE OF RECURRENT MAJOR DEPRESSIVE DISORDER (MULTI): ICD-10-CM

## 2023-12-27 DIAGNOSIS — E78.2 MIXED HYPERLIPIDEMIA: ICD-10-CM

## 2023-12-27 DIAGNOSIS — R05.3 CHRONIC COUGH: ICD-10-CM

## 2023-12-27 DIAGNOSIS — I10 ESSENTIAL HYPERTENSION: ICD-10-CM

## 2023-12-27 DIAGNOSIS — I73.9 INTERMITTENT CLAUDICATION (CMS-HCC): ICD-10-CM

## 2023-12-27 PROBLEM — E66.811 CLASS 1 OBESITY DUE TO EXCESS CALORIES WITH SERIOUS COMORBIDITY AND BODY MASS INDEX (BMI) OF 34.0 TO 34.9 IN ADULT: Status: ACTIVE | Noted: 2023-12-27

## 2023-12-27 LAB
ACE SERPL-CCNC: 11 U/L (ref 16–85)
T GONDII IGM SER-ACNC: <3 AU/ML

## 2023-12-27 PROCEDURE — 3074F SYST BP LT 130 MM HG: CPT | Performed by: FAMILY MEDICINE

## 2023-12-27 PROCEDURE — 1036F TOBACCO NON-USER: CPT | Performed by: FAMILY MEDICINE

## 2023-12-27 PROCEDURE — 3051F HG A1C>EQUAL 7.0%<8.0%: CPT | Performed by: FAMILY MEDICINE

## 2023-12-27 PROCEDURE — 3061F NEG MICROALBUMINURIA REV: CPT | Performed by: FAMILY MEDICINE

## 2023-12-27 PROCEDURE — 99214 OFFICE O/P EST MOD 30 MIN: CPT | Performed by: FAMILY MEDICINE

## 2023-12-27 PROCEDURE — 3078F DIAST BP <80 MM HG: CPT | Performed by: FAMILY MEDICINE

## 2023-12-27 PROCEDURE — 4010F ACE/ARB THERAPY RXD/TAKEN: CPT | Performed by: FAMILY MEDICINE

## 2023-12-27 PROCEDURE — 3049F LDL-C 100-129 MG/DL: CPT | Performed by: FAMILY MEDICINE

## 2023-12-27 PROCEDURE — 3008F BODY MASS INDEX DOCD: CPT | Performed by: FAMILY MEDICINE

## 2023-12-27 RX ORDER — LOSARTAN POTASSIUM 25 MG/1
25 TABLET ORAL DAILY
Qty: 30 TABLET | Refills: 3 | Status: SHIPPED | OUTPATIENT
Start: 2023-12-27 | End: 2024-05-13 | Stop reason: SDUPTHER

## 2023-12-27 RX ORDER — INSULIN GLARGINE 100 [IU]/ML
INJECTION, SOLUTION SUBCUTANEOUS
Qty: 30 ML | Refills: 3 | Status: SHIPPED | OUTPATIENT
Start: 2023-12-27 | End: 2024-05-13 | Stop reason: SDUPTHER

## 2023-12-27 ASSESSMENT — ENCOUNTER SYMPTOMS
POLYPHAGIA: 0
NUMBNESS: 0
POLYDIPSIA: 0
EYE DISCHARGE: 0
WHEEZING: 0
RHINORRHEA: 0
COUGH: 1
TREMORS: 0
VOMITING: 0
HEADACHES: 1
FEVER: 0
CONSTIPATION: 0
EYE REDNESS: 0
EYE PAIN: 0
DIARRHEA: 0
CHILLS: 0
SHORTNESS OF BREATH: 0
FREQUENCY: 0
SORE THROAT: 0
DIZZINESS: 0
ABDOMINAL PAIN: 0
PALPITATIONS: 0
HEMATURIA: 0
DYSURIA: 0

## 2023-12-27 ASSESSMENT — PATIENT HEALTH QUESTIONNAIRE - PHQ9
SUM OF ALL RESPONSES TO PHQ9 QUESTIONS 1 AND 2: 1
1. LITTLE INTEREST OR PLEASURE IN DOING THINGS: NOT AT ALL
2. FEELING DOWN, DEPRESSED OR HOPELESS: SEVERAL DAYS
2. FEELING DOWN, DEPRESSED OR HOPELESS: SEVERAL DAYS
SUM OF ALL RESPONSES TO PHQ9 QUESTIONS 1 AND 2: 1
1. LITTLE INTEREST OR PLEASURE IN DOING THINGS: NOT AT ALL
10. IF YOU CHECKED OFF ANY PROBLEMS, HOW DIFFICULT HAVE THESE PROBLEMS MADE IT FOR YOU TO DO YOUR WORK, TAKE CARE OF THINGS AT HOME, OR GET ALONG WITH OTHER PEOPLE: SOMEWHAT DIFFICULT
10. IF YOU CHECKED OFF ANY PROBLEMS, HOW DIFFICULT HAVE THESE PROBLEMS MADE IT FOR YOU TO DO YOUR WORK, TAKE CARE OF THINGS AT HOME, OR GET ALONG WITH OTHER PEOPLE: SOMEWHAT DIFFICULT

## 2023-12-27 NOTE — PROGRESS NOTES
"Chief Complaint   Patient presents with    Follow-up     Diabetes,Hypertension,Hyperlipidemia      Cough    Hospital Follow-up     Visual disturbances       Subjective   Patient ID: Paulo Roman is a 58 y.o. male who presents for Follow-up (Diabetes,Hypertension,Hyperlipidemia/), Cough, and Hospital Follow-up (Visual disturbances).    Patient: Paulo Roman  : 1965  PCP: Ez Maier MD  MRN: [id number]  Program: No linked episodes     Paulo Roman is a 58 y.o. male presenting today for follow-up after being discharged from the hospital 3 days ago. The main problem requiring admission was visual disturbances. The discharge summary and/or Transitional Care Management documentation was reviewed. Medication reconciliation was performed as indicated via the \"[place] as Reviewed\" timestamp.     He is currently still having issues with vision, blurry, unable to see sides on right eye.   Inflammation is down. They have not come to why he is having this issue. He has been having headaches.    Paulo Roman was contacted by Transitional Care Management services two days after his discharge. This encounter and supporting documentation was reviewed.    The complexity of medical decision making for this patient's transitional care is moderate.    Assessment/Plan  Problem List Items Addressed This Visit     Essential hypertension    Mixed hyperlipidemia    Type 2 diabetes mellitus without complication, with long-term current use   of insulin (CMS/[facility])     Review of patient's family history indicates:  Problem: Diabetes      Relation: Mother          Name:               Age of Onset: (Not Specified)  Problem: Hypertension      Relation: Mother          Name:               Age of Onset: (Not Specified)  Problem: Diabetes      Relation: Father          Name:               Age of Onset: (Not Specified)  Problem: Hypertension      Relation: Father          Name:               Age of Onset: (Not Specified)  Problem: " Diabetes      Relation: Daughter          Name:               Age of Onset: (Not Specified)  Problem: Diabetes      Relation: Other          Name: Sibling              Age of Onset: (Not Specified)      He presents today for follow up on Diabetes Mellitus, hypertension, hyperlipidemia . Current  diabetes related symptoms include: none. Patient denies foot ulcerations, hypoglycemia , nausea, paresthesia of the feet, polydipsia, polyuria, visual disturbances, vomiting, and weight loss.  Patient denies chest pain, claudication, dyspnea, exertional chest pressure/discomfort, irregular heart beat, lower extremity edema, orthopnea, palpitations, paroxysmal nocturnal dyspnea, and syncope. Evaluation to date has included: fasting blood sugar, fasting lipid panel, hemoglobin A1C, and microalbuminuria.  Home sugars: BGs are running  consistent with Hgb A1C.   Patient denies chest pain, claudication, dyspnea, exertional chest pressure/discomfort, irregular heart beat, lower extremity edema, orthopnea, palpitations, paroxysmal nocturnal dyspnea, and syncope.        Cough  This is a recurrent problem. The current episode started more than 1 month ago. The problem has been unchanged. The problem occurs every few minutes. The cough is Non-productive. Associated symptoms include headaches. Pertinent negatives include no chest pain, chills, ear congestion, ear pain, eye redness, fever, rhinorrhea, sore throat, shortness of breath or wheezing. The symptoms are aggravated by cold air.      Review of Systems   Constitutional:  Negative for chills and fever.   HENT:  Negative for congestion, ear pain, nosebleeds, rhinorrhea and sore throat.    Eyes:  Positive for visual disturbance. Negative for pain, discharge and redness.   Respiratory:  Positive for cough. Negative for shortness of breath and wheezing.    Cardiovascular:  Negative for chest pain, palpitations and leg swelling.   Gastrointestinal:  Negative for abdominal pain,  "constipation, diarrhea and vomiting.   Endocrine: Negative for heat intolerance, polydipsia, polyphagia and polyuria.   Genitourinary:  Negative for dysuria, frequency and hematuria.   Neurological:  Positive for headaches. Negative for dizziness, tremors and numbness.     Objective   /80 (BP Location: Right arm, Patient Position: Sitting)   Pulse 78   Temp 36.3 °C (97.3 °F)   Resp 18   Ht 1.778 m (5' 10\")   Wt 109 kg (241 lb)   SpO2 96%   BMI 34.58 kg/m²     Physical Exam  Constitutional:       General: He is not in acute distress.     Appearance: Normal appearance.   HENT:      Head: Normocephalic and atraumatic.      Mouth/Throat:      Mouth: Mucous membranes are moist.      Pharynx: Oropharynx is clear. No oropharyngeal exudate or posterior oropharyngeal erythema.   Eyes:      General: No scleral icterus.     Extraocular Movements: Extraocular movements intact.      Pupils: Pupils are equal, round, and reactive to light.   Cardiovascular:      Rate and Rhythm: Normal rate and regular rhythm.      Pulses: Normal pulses.      Heart sounds: No murmur heard.     No friction rub. No gallop.   Pulmonary:      Effort: Pulmonary effort is normal.      Breath sounds: No wheezing, rhonchi or rales.   Skin:     General: Skin is warm.      Coloration: Skin is not jaundiced or pale.   Neurological:      General: No focal deficit present.      Mental Status: He is alert and oriented to person, place, and time.       Admission on 12/23/2023, Discharged on 12/24/2023   Component Date Value Ref Range Status    Sedimentation Rate 12/23/2023 5  0 - 20 mm/h Final    C-Reactive Protein 12/23/2023 0.23  <1.00 mg/dL Final    Angio Converting Enzyme 12/23/2023 11 (L)  16 - 85 U/L Final    Performed By: [facility]  [address] [place] [id number]  : Semaj Lopez MD, PhD  CLIA Number: [id number]    Syphilis Total Ab 12/23/2023 Nonreactive  Nonreactive Final    No significant level " of Treponema pallidum antibody detected.   Repeat testing in 2 to 4 weeks may be considered if early   infection or incubating syphilis infection is suspected.    Toxoplasma IgM 12/23/2023 <3.0  <=7.9 AU/mL Final    Comment: INTERPRETIVE INFORMATION: Toxoplasma Ab, IgM      7.9 AU/mL or less .... Not Detected.      8.0-9.9 AU/mL ........ Indeterminate - Repeat testing in                           10-14 days may be helpful.      10.0 AU/mL or greater. Detected - Significant level of                            Toxoplasma gondii IgM antibody                            detected and may indicate a current                           or recent infection. However, low                           levels of IgM antibodies may                                occasionally persist for more than                           12 months post-infection.    This test is performed using the [employer] LIAISON. As suggested by   the CDC, any indeterminate or detected Toxoplasma gondii IgM   result should be retested in parallel with a specimen collected   1-3 weeks later. Further confirmation may be necessary using a   different test from another reference laboratory specializing in   toxoplasmosis testing where an IgM PETER should be                            ordered.   Caution should be exercised in the use of IgM antibody levels in   prenatal screening. Any Toxoplasma gondii IgM in pregnant patients   that have also been confirmed by a second reference laboratory   should be evaluated by amniocentesis and PCR testing for   Toxoplasma gondii.    For male and non-pregnant female patients with indeterminate or   detected Toxoplasma gondii IgM results, PCR may also be useful if   a specimen can be collected from an affected body site.    This test should not be used for blood donor screening, associated   re-entry protocols, or for screening Human Cell, Tissues and   Cellular and Tissue-Based Products (HCT/P).     For additional information,  "refer to the CDC website:   www.cdc.gov/parasites/toxoplasmosis/health_professionals/index.html  .     The magnitude of the measured result is not indicative of the   amount of antibody present.  Performed By: [facility]  [address] [place] [id number]  : Semaj Lopez MD, PhD  CLIA Number: [id number]    Toxoplasma IgG 12/23/2023 Nonreactive  Nonreactive Final    POCT Glucose 12/23/2023 186 (H)  74 - 99 mg/dL Final    POCT Glucose 12/23/2023 81  74 - 99 mg/dL Final    POCT Glucose 12/24/2023 73 (L)  74 - 99 mg/dL Final    POCT Glucose 12/24/2023 90  74 - 99 mg/dL Final    POCT Glucose 12/24/2023 129 (H)  74 - 99 mg/dL Final      Assessment/Plan   Problem List Items Addressed This Visit       Essential hypertension     Dietary sodium restriction.  Regular aerobic exercise program is recommended to help achieve and maintain normal body weight, fitness and improve lipid balance. .  Dietary changes: Increase soluble fiber  Plant sterols 2grams per day (e.g. Benecol)  Reduce saturated fat, \"trans\" monounsaturated fatty acids, and cholesterol           Intermittent claudication (CMS/HCC)     Chronic Condition Documentation : Stable based on symptoms and exam.  Continue established treatment plan and follow-up at least yearly.           Mixed hyperlipidemia     The nature of cardiac risk has been fully discussed with this patient. Discussed cardiovascular risk analysis and appropriate diet with the need for lifelong measures to reduce the risk. A regular exercise program is recommended to help achieve and maintain normal body weight, fitness and improve lipid balance. Patient education provided. They understand and agree with this course of treatment. They will return with new or worsening symptoms. Patient instructed to remain current with appropriate annual health maintenance.            Moderate episode of recurrent major depressive disorder (CMS/HCC) "     Chronic Condition Documentation : Stable based on symptoms and exam.  Continue established treatment plan and follow-up at least yearly.           Persistent cough    Type 2 diabetes mellitus without complication, with long-term current use of insulin (CMS/[facility])     Diabetes Mellitus type II, under fair control.  1. Rx changes: stopped Ozempic and increased insulin back to 80 units  Will restart Rybelsus 3mg for one week, then 6mg for another week and then start 14mg.  2. Education: Reviewed ‘ABCs’ of diabetes management (respective goals in parentheses):  A1C (<7), blood pressure (<130/80), and cholesterol (LDL <100).  3. Compliance at present is estimated to be fair. Efforts to improve compliance (if necessary) will be directed at dietary modifications: and increased exercise.  4. Follow up: 3 months           Class 1 obesity due to excess calories with serious comorbidity and body mass index (BMI) of 34.0 to 34.9 in adult     Continue decrease calorie diet and not more than 1500 calorie per day diet and low-fat diet.  Continue with regular exercise program.  We advised exercise at least 5 days a week for at least 45 minutes and also a minimum of 10,000 steps a day.  The detrimental effects of obesity on health were discussed.            Other Visit Diagnoses       Chronic cough        ACE-inhibitor cough            Lab and other ancillary tests ordered as above.  See current orders.   Lab results and medications were discussed update refills on the medications.   The risks benefits and side effects of the medications were also discussed  All questions addressed.    Scribe Attestation  By signing my name below, IAna Scribe   attest that this documentation has been prepared under the direction and in the presence of Ez Maier MD.

## 2023-12-27 NOTE — PATIENT INSTRUCTIONS
BMI was above normal measurement. Current weight: 109 kg (241 lb)  Weight change since last visit (-) denotes wt loss 9 lbs   Weight loss needed to achieve BMI 25: 67.1 Lbs  Weight loss needed to achieve BMI 30: 32.4 Lbs  Provided instructions on dietary changes  Provided instructions on exercise  Advised to Increase physical activity.

## 2023-12-28 LAB
B BURGDOR DNA SPEC QL NAA+PROBE: NOT DETECTED
B HENSELAE IGG TITR SER IF: NORMAL {TITER}
B HENSELAE IGM TITR SER IF: NORMAL {TITER}
SPECIMEN SOURCE: NORMAL

## 2023-12-29 ENCOUNTER — ANCILLARY PROCEDURE (OUTPATIENT)
Dept: RADIOLOGY | Facility: CLINIC | Age: 58
End: 2023-12-29
Payer: COMMERCIAL

## 2023-12-29 DIAGNOSIS — R05.3 CHRONIC COUGH: ICD-10-CM

## 2023-12-29 LAB
AQP4 H2O CHANNEL IGG SERPL QL: NEGATIVE
MOG IGG1 SERPL QL FC: NEGATIVE

## 2023-12-29 PROCEDURE — 71046 X-RAY EXAM CHEST 2 VIEWS: CPT

## 2023-12-29 PROCEDURE — 71046 X-RAY EXAM CHEST 2 VIEWS: CPT | Performed by: RADIOLOGY

## 2024-01-02 ENCOUNTER — OFFICE VISIT (OUTPATIENT)
Dept: PRIMARY CARE | Facility: CLINIC | Age: 59
End: 2024-01-02
Payer: COMMERCIAL

## 2024-01-02 ENCOUNTER — OFFICE VISIT (OUTPATIENT)
Dept: OPHTHALMOLOGY | Facility: CLINIC | Age: 59
End: 2024-01-02
Payer: COMMERCIAL

## 2024-01-02 VITALS
HEART RATE: 109 BPM | WEIGHT: 241.8 LBS | BODY MASS INDEX: 34.62 KG/M2 | RESPIRATION RATE: 16 BRPM | TEMPERATURE: 96.8 F | OXYGEN SATURATION: 95 % | SYSTOLIC BLOOD PRESSURE: 150 MMHG | HEIGHT: 70 IN | DIASTOLIC BLOOD PRESSURE: 91 MMHG

## 2024-01-02 DIAGNOSIS — H47.291 PARTIAL OPTIC ATROPHY OF RIGHT EYE: ICD-10-CM

## 2024-01-02 DIAGNOSIS — H47.10 EDEMA OF OPTIC DISC OF LEFT EYE: ICD-10-CM

## 2024-01-02 DIAGNOSIS — J06.9 VIRAL UPPER RESPIRATORY TRACT INFECTION: Primary | ICD-10-CM

## 2024-01-02 DIAGNOSIS — R09.81 SINUS CONGESTION: ICD-10-CM

## 2024-01-02 DIAGNOSIS — R05.1 ACUTE COUGH: ICD-10-CM

## 2024-01-02 DIAGNOSIS — H53.453 ALTITUDINAL SCOTOMA OF BOTH EYES: Primary | ICD-10-CM

## 2024-01-02 PROCEDURE — 99213 OFFICE O/P EST LOW 20 MIN: CPT | Performed by: NURSE PRACTITIONER

## 2024-01-02 PROCEDURE — 92083 EXTENDED VISUAL FIELD XM: CPT | Performed by: PSYCHIATRY & NEUROLOGY

## 2024-01-02 PROCEDURE — 99215 OFFICE O/P EST HI 40 MIN: CPT | Performed by: PSYCHIATRY & NEUROLOGY

## 2024-01-02 PROCEDURE — 92133 CPTRZD OPH DX IMG PST SGM ON: CPT | Performed by: PSYCHIATRY & NEUROLOGY

## 2024-01-02 RX ORDER — BENZONATATE 200 MG/1
200 CAPSULE ORAL 3 TIMES DAILY PRN
Qty: 42 CAPSULE | Refills: 0 | Status: SHIPPED | OUTPATIENT
Start: 2024-01-02 | End: 2024-02-01

## 2024-01-02 RX ORDER — CEFDINIR 300 MG/1
300 CAPSULE ORAL 2 TIMES DAILY
Qty: 20 CAPSULE | Refills: 0 | Status: SHIPPED | OUTPATIENT
Start: 2024-01-02 | End: 2024-01-12

## 2024-01-02 ASSESSMENT — REFRACTION_WEARINGRX
OS_ADD: +2.25
OS_AXIS: 044
OS_CYLINDER: -0.75
OS_SPHERE: +0.25
OD_ADD: +2.25
OD_CYLINDER: -1.25
OD_SPHERE: +0.25
OD_AXIS: 122

## 2024-01-02 ASSESSMENT — VISUAL ACUITY
OD_CC: 20/20
METHOD: SNELLEN - LINEAR
CORRECTION_TYPE: GLASSES
OS_CC: 20/25-1

## 2024-01-02 ASSESSMENT — ENCOUNTER SYMPTOMS
HEMATOLOGIC/LYMPHATIC NEGATIVE: 0
CONSTITUTIONAL NEGATIVE: 0
GASTROINTESTINAL NEGATIVE: 0
SORE THROAT: 0
PALPITATIONS: 0
OCCASIONAL FEELINGS OF UNSTEADINESS: 0
DEPRESSION: 0
DIZZINESS: 0
PSYCHIATRIC NEGATIVE: 0
MUSCULOSKELETAL NEGATIVE: 0
ALLERGIC/IMMUNOLOGIC NEGATIVE: 0
ENDOCRINE NEGATIVE: 0
SINUS PAIN: 0
NEUROLOGICAL NEGATIVE: 0
COUGH: 1
SHORTNESS OF BREATH: 0
LOSS OF SENSATION IN FEET: 0
EYES NEGATIVE: 1
HEADACHES: 1
CHILLS: 0
RESPIRATORY NEGATIVE: 0
FEVER: 0
CARDIOVASCULAR NEGATIVE: 0

## 2024-01-02 ASSESSMENT — CONF VISUAL FIELD
OS_SUPERIOR_NASAL_RESTRICTION: 0
OD_INFERIOR_TEMPORAL_RESTRICTION: 1
OS_INFERIOR_TEMPORAL_RESTRICTION: 0
OS_SUPERIOR_TEMPORAL_RESTRICTION: 0
OD_INFERIOR_NASAL_RESTRICTION: 1
OS_INFERIOR_NASAL_RESTRICTION: 1
METHOD: COUNTING FINGERS

## 2024-01-02 ASSESSMENT — TONOMETRY
OS_IOP_MMHG: 15
IOP_METHOD: GOLDMANN APPLANATION
OD_IOP_MMHG: 16

## 2024-01-02 ASSESSMENT — CUP TO DISC RATIO
OD_RATIO: 0.2
OS_RATIO: OBSCURED

## 2024-01-02 ASSESSMENT — EXTERNAL EXAM - RIGHT EYE: OD_EXAM: NORMAL

## 2024-01-02 ASSESSMENT — SLIT LAMP EXAM - LIDS
COMMENTS: NORMAL
COMMENTS: NORMAL

## 2024-01-02 ASSESSMENT — EXTERNAL EXAM - LEFT EYE: OS_EXAM: NORMAL

## 2024-01-02 NOTE — PATIENT INSTRUCTIONS
Your symptoms may be related to an unspecified viral illness such as: an URI (upper respiratory infection)  URIs are a self limiting viral syndrome, involving, to variable degrees, sneezing, nasal congestion and discharge (rhinorrhea), sore throat, cough, low grade fever, headache, and malaise.   The Incubation period (from the time of contact with infectious material until the onset of symptoms) for most common cold viruses is 24 to 72 hours. Colds usually persist for 3 to 10 days in the normal host. Cough can persist for weeks after resolution of other signs and symptoms   Start using humidifier in your bedroom at night when sleeping. This helps with coughing and congestion. Keep well hydrated along with adequate rest and nutrition. Warm tea with honey is soothing to the throat and helps with coughing. This could also help thin the mucus, reducing the blockage in your sinuses. Elevate your head with an extra pillow while sleeping to prevent mucus from blocking your throat.   You may also do benzonatate as needed for your cough  May use Tylenol per package instructions as needed for pain/fever  If your symptoms are persisting more than 10 days, you develop worsening sinus pain/pressure or fevers/chills, may fill and take antibiotic as directed.  If symptoms do not improve, see PCP within 1 week, or sooner with any additional concerns.  If you develop worsening symptoms including shortness of breath/trouble breathing, bluish lips or chest pain, proceed to the Emergency department for further treatment

## 2024-01-02 NOTE — PROGRESS NOTES
"Subjective   Patient ID: Paulo Roman is a 58 y.o. male who presents for URI.    URI   This is a new problem. The current episode started in the past 7 days. The problem has been unchanged. Associated symptoms include congestion, coughing and headaches. Pertinent negatives include no chest pain, ear pain, sinus pain or sore throat. He has tried acetaminophen, decongestant and antihistamine for the symptoms. The treatment provided no relief.       58-year-old male presents today complaining of nasal congestion, cough and headaches that he has been experiencing for the last 3 days.  He denies any sore throat.  No fever or chills.  He denies any chest pain or trouble breathing.  Last night he did have a check coughing jags where he did experience some shortness of breath.  He denies smoking or vaping.  No history of asthma.  He was around his grandchildren who were sick with similar symptoms.  He has been taking Mucinex and a prescription cough pill with little relief.    Review of Systems   Constitutional:  Negative for chills and fever.   HENT:  Positive for congestion. Negative for ear pain, sinus pain and sore throat.    Respiratory:  Positive for cough. Negative for shortness of breath.    Cardiovascular:  Negative for chest pain and palpitations.   Neurological:  Positive for headaches. Negative for dizziness.       Objective   BP (!) 150/91   Pulse 109   Temp 36 °C (96.8 °F) (Temporal)   Resp 16   Ht 1.778 m (5' 10\")   Wt 110 kg (241 lb 12.8 oz)   SpO2 95%   BMI 34.69 kg/m²     Physical Exam  Vitals and nursing note reviewed.   Constitutional:       General: He is not in acute distress.     Appearance: Normal appearance.   HENT:      Right Ear: Tympanic membrane normal.      Left Ear: Tympanic membrane normal.      Nose: Congestion present.      Mouth/Throat:      Pharynx: No oropharyngeal exudate or posterior oropharyngeal erythema.   Eyes:      Conjunctiva/sclera: Conjunctivae normal. "   Cardiovascular:      Rate and Rhythm: Normal rate and regular rhythm.      Heart sounds: Normal heart sounds.   Pulmonary:      Effort: Pulmonary effort is normal.      Breath sounds: Normal breath sounds. No wheezing, rhonchi or rales.   Lymphadenopathy:      Cervical: No cervical adenopathy.   Skin:     General: Skin is warm and dry.   Neurological:      Mental Status: He is alert.   Psychiatric:         Mood and Affect: Mood normal.         Behavior: Behavior normal.         Assessment/Plan   Problem List Items Addressed This Visit    None  Visit Diagnoses         Codes    Viral upper respiratory tract infection    -  Primary J06.9    Acute cough     R05.1    Relevant Medications    benzonatate (Tessalon) 200 mg capsule    cefdinir (Omnicef) 300 mg capsule    Sinus congestion     R09.81    BMI 34.0-34.9,adult     Z68.34        Educated that infection was likely viral in nature and antibiotics were not indicated at this time.  Increase rest and fluids.  Benzonatate as needed for cough.  Continue with Flonase as directed.  If symptoms are persisting more than 10 days, he develops any fever or chills or worsening sinus pain/pressure, may fill and take antibiotic as directed.  Follow-up with PCP with any persisting symptoms.

## 2024-01-02 NOTE — PROGRESS NOTES
Assessment and Plan    12/23/2023 MRI brain & orbits with contrast & MRV head, which I personally reviewed, show no lesion.    12/23/2023 ESR 5. CRP 0.23 mg/dL. NMO/AQP4 & MOG antibodies, Toxoplasma IgG & IgM, Bartonella abs, syphilis, Lyme abs negative/wnl. ACE low.  11/29/2023 CRP 0.37 mg/dL.  07/21/2023 ESR 6. CRP 0.20 mg/dL.  10/09/2019 ESR 15. CRP 0.47 mg/dL.    1/2/2024 OCT RNFL OD 55 & . (Similar to small progression OD & new edema OS)  10/20/2023 OCT RNFL OD 63 with S & N thinning & . (Lower right eye (OD) & stable left eye (OS))  08/25/2023 OCT RNFL  & . (lower OD & stable OS)  07/24/2023 OCT RNFL  & .    1/2/2024 HVF 24-2 OD fovea 38, inferior altitudinal MD -18.28 & OS fovea 37, inferior altitudinal MD -16.76.  10/20/2023 HVF 24-2 OD fovea 37, inferior altitudinal MD -17.23 & OS fovea 39, wnl MD +0.03.  08/25/2023 HVF 24-2 OD fovea 38, inferior altitudinal MD -16.45 & OS fovea 37, wnl MD -0.60.  07/24/2023 HVF 24-2 OD fovea 36, inferior altitudinal & superior nasal step MD -16.67 & OS fovea 38, wnl MD -0.71.    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     The right optic disc does have some hyper-reflective areas that could be exudates from prior edema, pseudodrusen, versus true drusen that could have been an underlying additional risk factor for non-arteritic anterior ischemic optic neuropathy. I will monitor over time.    Extensive evaluation for optic neuritis, neuroretinitis and arteritic anterior ischemic optic neuropathy was  negative. Elevation of intracranial pressure and true papilledema is very unlikely. We discussed the expected course.    Plan    Vasculopathic risk factor control including treatment of JERI.    Follow up in 1-2 months with HVF & OCT. (dilated 1/2/2024)

## 2024-01-02 NOTE — LETTER
January 2, 2024     Patient: Paulo Roman   YOB: 1965   Date of Visit: 1/2/2024       To Whom It May Concern:    It is my medical opinion that Paulo Roman should remain out of work until the next visit in 1-2 months given current restrictions against driving and against operating heavy machinery, both essential to his work as a  . These restrictions might become permanent depending on the level of recovery, which varies from one person to another.    If you have any questions or concerns, please don't hesitate to call.         Sincerely,        Edgar Torres MD PhD    CC: No Recipients

## 2024-01-03 ENCOUNTER — APPOINTMENT (OUTPATIENT)
Dept: CARDIOLOGY | Facility: CLINIC | Age: 59
End: 2024-01-03
Payer: COMMERCIAL

## 2024-01-10 ENCOUNTER — OFFICE VISIT (OUTPATIENT)
Dept: CARDIOLOGY | Facility: CLINIC | Age: 59
End: 2024-01-10
Payer: COMMERCIAL

## 2024-01-10 VITALS
BODY MASS INDEX: 34.65 KG/M2 | SYSTOLIC BLOOD PRESSURE: 130 MMHG | WEIGHT: 242 LBS | HEIGHT: 70 IN | DIASTOLIC BLOOD PRESSURE: 72 MMHG | HEART RATE: 80 BPM

## 2024-01-10 DIAGNOSIS — E78.2 MIXED HYPERLIPIDEMIA: ICD-10-CM

## 2024-01-10 DIAGNOSIS — G47.33 OBSTRUCTIVE SLEEP APNEA: ICD-10-CM

## 2024-01-10 DIAGNOSIS — I10 ESSENTIAL HYPERTENSION: ICD-10-CM

## 2024-01-10 DIAGNOSIS — I25.10 ATHEROSCLEROSIS OF NATIVE CORONARY ARTERY OF NATIVE HEART WITHOUT ANGINA PECTORIS: ICD-10-CM

## 2024-01-10 DIAGNOSIS — Z78.9 NEVER SMOKED TOBACCO: ICD-10-CM

## 2024-01-10 DIAGNOSIS — E11.9 TYPE 2 DIABETES MELLITUS WITHOUT COMPLICATION, WITH LONG-TERM CURRENT USE OF INSULIN (MULTI): ICD-10-CM

## 2024-01-10 DIAGNOSIS — I73.9 INTERMITTENT CLAUDICATION (CMS-HCC): ICD-10-CM

## 2024-01-10 DIAGNOSIS — Z79.4 TYPE 2 DIABETES MELLITUS WITHOUT COMPLICATION, WITH LONG-TERM CURRENT USE OF INSULIN (MULTI): ICD-10-CM

## 2024-01-10 PROCEDURE — 3008F BODY MASS INDEX DOCD: CPT | Performed by: INTERNAL MEDICINE

## 2024-01-10 PROCEDURE — 99214 OFFICE O/P EST MOD 30 MIN: CPT | Performed by: INTERNAL MEDICINE

## 2024-01-10 PROCEDURE — 3075F SYST BP GE 130 - 139MM HG: CPT | Performed by: INTERNAL MEDICINE

## 2024-01-10 PROCEDURE — 1036F TOBACCO NON-USER: CPT | Performed by: INTERNAL MEDICINE

## 2024-01-10 PROCEDURE — 4010F ACE/ARB THERAPY RXD/TAKEN: CPT | Performed by: INTERNAL MEDICINE

## 2024-01-10 PROCEDURE — 3078F DIAST BP <80 MM HG: CPT | Performed by: INTERNAL MEDICINE

## 2024-01-10 NOTE — PROGRESS NOTES
CARDIOLOGY OFFICE VISIT      CHIEF COMPLAINT  Chief Complaint   Patient presents with    Follow-up     Patient here for overdue 1 year follow-up.  LOV 10/19/2022        HISTORY OF PRESENT ILLNESS  Patient is a 59-year-old  male past medical history significant for coronary artery disease, non-ST elevation microinfarction, drug-eluting stent RCA June 30, 2020, diabetes mellitus, dyslipidemia, hypertension, osteoarthritis who presents for follow-up and vascular care.     Patient's primary complaint is vision loss and is following with ophthalmology.  Denies chest pain, dyspnea, palpitations, nausea, vomiting, dizziness, fever, chill, bleeding.  Patient does not exercise.     Past surgical history:  Left rotator cuff repair  Bilateral knee surgery meniscus repair     Social history:  Quit smoking in his 30s  Denies alcohol use     Family history:  Father alive 75 diabetes, hypertension, hyperlipidemia  Mother alive 70 for diabetes, hypertension, hyperlipidemia     Review systems have been reviewed and are negative, noncontributory, orders previously mentioned Ã--12 systems.     A personally reviewed EKG March 9, 2022: Normal sinus     Assessment:  Coronary artery disease/non-ST elevation myocardial infarction/drug-eluting stent RCA June 30, 2020  Hypertension  Dyslipidemia  Diabetes mellitus  Obstructive sleep apnea  Obesity  Osteoarthritis  Depression  Vision loss/ischemic optic neuropathy-Dr. Torres  Intolerance ACE inhibitor due to cough     Recommendations:  Patient appears to be stable from a cardiac standpoint. Symptoms of angina and no ischemia on stress test. No symptomatic arrhythmia. No evidence of heart failure. I encourage diet, weight loss, exercise program 30 minutes per day. Increase water intake to 64 ounces per day.Blood pressure under good control. I advised the patient decrease the use of nonsteroidal anti-inflammatory medications explained the adverse affects to his cardiac,  gastrointestinal, and renal systems. Advise maintain blood pressure diary prior to future office visits. Consider future use of PCSK9 inhibitor.Follow-up in 1 year. Check CMP, lipid profile that time.     Please excuse any errors in grammar or translation related to this dictation. Voice recognition software was utilized to prepare this document.     Recent Cardiovascular Testing:  The following results have been reviewed and updated.      Labs 9/13/22  1.Chol: 176, HDL 38, , Trig 168     Echocardiogram 6/30/2020:  1.EF 60-65%        MPL 10/12/22  1. 10.2 METS  2. Normal  3. EF 57%        VITALS  Vitals:    01/10/24 1338   BP: 130/72   Pulse: 80     Wt Readings from Last 4 Encounters:   01/10/24 110 kg (242 lb)   01/02/24 110 kg (241 lb 12.8 oz)   12/27/23 109 kg (241 lb)   12/23/23 105 kg (232 lb)       PHYSICAL EXAM:  GENERAL:  Well developed, well nourished, in no acute distress.  CHEST:  Symmetric and nontender.  NEURO/PSYCH:  Alert and oriented times three with approppriate behavior and responses.  NECK:  Supple, no JVD, no bruit.  LUNGS:  Clear to auscultation bilaterally, normal respiratory effort.  HEART:  Rate and rhythm regular with no evident murmur, no gallop appreciated.                   There are no rubs, clicks or heaves.  EXTREMITIES:  Warm with good color, no clubbing or cyanosis.  There is no edema noted.  PERIPHERAL VASCULAR:  Pulses present and equally palpable; 2+ throughout.    ASSESSMENT AND PLAN  Problem List Items Addressed This Visit       Atherosclerosis of coronary artery    Essential hypertension    Intermittent claudication (CMS/HCC)    Mixed hyperlipidemia    Type 2 diabetes mellitus without complication, with long-term current use of insulin (CMS/HCC)    Obstructive sleep apnea    BMI 35.0-35.9,adult    Never smoked tobacco       Past Medical History  Past Medical History:   Diagnosis Date    Coronary artery disease     Diabetes mellitus (CMS/HCC)     Hypertension     Other  "specified health status     Non-smoker       Social History  Social History     Tobacco Use    Smoking status: Never     Passive exposure: Never    Smokeless tobacco: Never   Vaping Use    Vaping Use: Never used   Substance Use Topics    Alcohol use: Never    Drug use: Never       Family History     Family History   Problem Relation Name Age of Onset    Diabetes Mother      Hypertension Mother      Diabetes Father      Hypertension Father      Diabetes Daughter      Diabetes Other Sibling         Allergies:  Allergies   Allergen Reactions    Ace Inhibitors Cough    Lisinopril Cough    Semaglutide Diarrhea        Outpatient Medications:  Current Outpatient Medications   Medication Instructions    aspirin 81 mg, oral, Daily    atorvastatin (LIPITOR) 80 mg, oral, Nightly    BD Marly 2nd Gen Pen Needle 32 gauge x 5/32\" needle USE 1 DAILY AS DIRECTED    benzonatate (TESSALON) 200 mg, oral, 3 times daily PRN, Do not crush or chew.    blood-glucose sensor (Dexcom G6 Sensor) device USE TO CHECK BLOOD SUGAR FOUR TIMES DAILY    cefdinir (OMNICEF) 300 mg, oral, 2 times daily    Dexcom G4 platinum  (Dexcom G6 ) misc USE TO CHECK BLOOD SUGAR 4 TIMES DAILY    Dexcom G4 platinum transmitter (Dexcom G6 Transmitter) device USE TO CHECK BLOOD SUGAR 4 TIMES DAILY    diclofenac (VOLTAREN) 50 mg, oral, Every 8 hours PRN    Farxiga 10 mg TAKE ONE TABLET BY MOUTH EVERY DAY IN THE MORNING    fenofibrate (TRIGLIDE) 160 mg, oral, Daily    fluticasone (Flonase) 50 mcg/actuation nasal spray 2 sprays, Each Nostril, Daily    icosapent ethyL (VASCEPA) 2 g, oral, 2 times daily    insulin glargine (Basaglar KwikPen U-100 Insulin) 100 unit/mL (3 mL) pen INJECT 80 UNITS SUBCUTANEOUSLY EVERY MORNING    insulin lispro (HumaLOG U-100 Insulin) 100 unit/mL injection fill v-go patch with 100 units daily as directed    losartan (COZAAR) 25 mg, oral, Daily    metoprolol succinate XL (TOPROL-XL) 25 mg, oral, Daily, Do not crush or chew.    " "nitroglycerin (NITROSTAT) 0.4 mg, sublingual    omeprazole (PRILOSEC) 20 mg, oral    semaglutide (RYBELSUS) 14 mg, oral, Daily    sub-q insulin device, 40 unit (V-GO 40) device USE DAILY AS DIRECTED    traZODone (DESYREL) 50 mg, oral, Nightly        Recent Lab Results:    CMP:    Lab Results   Component Value Date     12/23/2023    K 4.3 12/23/2023     12/23/2023    CO2 26 12/23/2023    BUN 19 12/23/2023    CREATININE 1.12 12/23/2023    GLUCOSE 104 (H) 12/23/2023    CALCIUM 8.9 12/23/2023       Magnesium:    Lab Results   Component Value Date    MG 2.06 12/23/2023       Lipid Profile:    Lab Results   Component Value Date    TRIG 168 (H) 12/23/2023    HDL 38.0 12/23/2023    LDLCALC 104 (H) 12/23/2023    LDLDIRECT 77 06/30/2020       TSH:    No results found for: \"TSH\"    BNP:   Lab Results   Component Value Date    BNP 12 06/29/2020        PT/INR:    Lab Results   Component Value Date    PROTIME 11.5 12/23/2023    INR 1.0 12/23/2023       HgBA1c:    Lab Results   Component Value Date    HGBA1C 7.3 (H) 12/23/2023       BMP:  Lab Results   Component Value Date     12/23/2023     12/23/2023     11/29/2023    K 4.3 12/23/2023    K 4.1 12/23/2023    K 4.0 11/29/2023     12/23/2023     12/23/2023     11/29/2023    CO2 26 12/23/2023    CO2 28 12/23/2023    CO2 26 11/29/2023    BUN 19 12/23/2023    BUN 19 12/23/2023    BUN 24 (H) 11/29/2023    CREATININE 1.12 12/23/2023    CREATININE 1.06 12/23/2023    CREATININE 1.10 11/29/2023       CBC:  Lab Results   Component Value Date    WBC 4.3 (L) 12/23/2023    WBC 8.4 11/29/2023    WBC 5.3 07/26/2023    WBC 5.0 04/25/2023    WBC 5.3 01/23/2023    RBC 5.41 12/23/2023    RBC 5.48 11/29/2023    RBC 5.55 07/26/2023    RBC 5.53 04/25/2023    RBC 5.92 (H) 01/23/2023    HGB 16.2 12/23/2023    HGB 16.3 11/29/2023    HGB 16.2 07/26/2023    HGB 16.3 04/25/2023    HGB 17.4 01/23/2023    HCT 47.5 12/23/2023    HCT 49.2 11/29/2023    HCT 49.1 " 07/26/2023    HCT 48.4 04/25/2023    HCT 52.3 (H) 01/23/2023    MCV 88 12/23/2023    MCV 90 11/29/2023    MCV 88 07/26/2023    MCV 88 04/25/2023    MCV 88 01/23/2023    MCH 29.9 12/23/2023    MCH 29.7 11/29/2023    MCHC 34.1 12/23/2023    MCHC 33.1 11/29/2023    MCHC 33.0 07/26/2023    MCHC 33.7 04/25/2023    MCHC 33.3 01/23/2023    RDW 12.2 12/23/2023    RDW 12.5 11/29/2023    RDW 12.7 07/26/2023    RDW 13.2 04/25/2023    RDW 12.8 01/23/2023     12/23/2023     11/29/2023     07/26/2023     04/25/2023     01/23/2023       Cardiac Enzymes:    Lab Results   Component Value Date    TROPHS 3 12/23/2023       Hepatic Function Panel:    Lab Results   Component Value Date    ALKPHOS 47 12/23/2023    ALT 23 12/23/2023    AST 19 12/23/2023    PROT 7.2 12/23/2023    BILITOT 0.5 12/23/2023           Edgar Seals DO

## 2024-01-15 DIAGNOSIS — Z79.4 TYPE 2 DIABETES MELLITUS WITHOUT COMPLICATION, WITH LONG-TERM CURRENT USE OF INSULIN (MULTI): ICD-10-CM

## 2024-01-15 DIAGNOSIS — E11.9 TYPE 2 DIABETES MELLITUS WITHOUT COMPLICATION, WITH LONG-TERM CURRENT USE OF INSULIN (MULTI): ICD-10-CM

## 2024-01-15 RX ORDER — SUB-Q INSULIN DEVICE, 40 UNIT
EACH MISCELLANEOUS
Qty: 90 KIT | Refills: 1 | Status: SHIPPED | OUTPATIENT
Start: 2024-01-15 | End: 2024-03-29

## 2024-01-16 DIAGNOSIS — Z79.4 TYPE 2 DIABETES MELLITUS WITHOUT COMPLICATION, WITH LONG-TERM CURRENT USE OF INSULIN (MULTI): ICD-10-CM

## 2024-01-16 DIAGNOSIS — E11.9 TYPE 2 DIABETES MELLITUS WITHOUT COMPLICATION, WITH LONG-TERM CURRENT USE OF INSULIN (MULTI): ICD-10-CM

## 2024-01-16 RX ORDER — DAPAGLIFLOZIN 10 MG/1
TABLET, FILM COATED ORAL
Qty: 30 TABLET | Refills: 3 | Status: SHIPPED | OUTPATIENT
Start: 2024-01-16 | End: 2024-05-13 | Stop reason: SDUPTHER

## 2024-01-16 NOTE — TELEPHONE ENCOUNTER
Recent Visits  Date Type Provider Dept   12/27/23 Office Visit Ez Maier MD Do [id number] [facility]   08/22/23 Office Visit Ez Maier MD Do [id number] [facility]   07/27/23 Office Visit Ez Maier MD Do [id number] [facility]   07/07/23 Office Visit Kelly J Slavik-Bosworth, APRN-CNP Do [id number] [facility]   05/30/23 Office Visit Ez Maier MD Do [id number] [facility]   05/09/23 Office Visit Kelly J Slavik-Bosworth, APRN-CNP Do [id number] [facility]   04/27/23 Office Visit Ez Maier MD Do [id number] [facility]   03/30/23 Office Visit Ez Maier MD Do [id number] [facility]   03/13/23 Office Visit Wilbur Aviles, APRN-CNP Do [id number] [facility]   Showing recent visits within past 540 days and meeting all other requirements  Future Appointments  Date Type Provider Dept   03/26/24 Appointment Ez Maier MD Do [id number] [facility]   Showing future appointments within next 180 days and meeting all other requirements

## 2024-01-18 DIAGNOSIS — Z79.4 TYPE 2 DIABETES MELLITUS WITHOUT COMPLICATION, WITH LONG-TERM CURRENT USE OF INSULIN (MULTI): ICD-10-CM

## 2024-01-18 DIAGNOSIS — E11.9 TYPE 2 DIABETES MELLITUS WITHOUT COMPLICATION, WITH LONG-TERM CURRENT USE OF INSULIN (MULTI): ICD-10-CM

## 2024-01-18 DIAGNOSIS — F41.1 GENERALIZED ANXIETY DISORDER: ICD-10-CM

## 2024-01-18 DIAGNOSIS — F33.1 MODERATE EPISODE OF RECURRENT MAJOR DEPRESSIVE DISORDER (MULTI): ICD-10-CM

## 2024-01-18 RX ORDER — TRAZODONE HYDROCHLORIDE 50 MG/1
50 TABLET ORAL NIGHTLY
Qty: 30 TABLET | Refills: 2 | Status: SHIPPED | OUTPATIENT
Start: 2024-01-18 | End: 2024-05-13 | Stop reason: SDUPTHER

## 2024-01-18 RX ORDER — ESCITALOPRAM OXALATE 20 MG/1
20 TABLET ORAL DAILY
Qty: 30 TABLET | Refills: 2 | Status: SHIPPED | OUTPATIENT
Start: 2024-01-18 | End: 2024-05-13 | Stop reason: SDUPTHER

## 2024-01-18 RX ORDER — INSULIN LISPRO 100 [IU]/ML
INJECTION, SOLUTION INTRAVENOUS; SUBCUTANEOUS
Qty: 30 ML | Refills: 2 | Status: SHIPPED | OUTPATIENT
Start: 2024-01-18 | End: 2024-05-07 | Stop reason: SDUPTHER

## 2024-01-18 NOTE — TELEPHONE ENCOUNTER
Name: Paulo CARLOS Roman  :  1965     Date of last appointment:  2023   Date of next appointment:  3/26/2024   Best number to reach patient:  [phone]

## 2024-03-06 NOTE — ED PROCEDURE NOTE
Procedure  Procedures    EKG at 10:03 with ventricular rate of 83, as interpreted by me, shows a normal rate and rhythm, normal axis, normal intervals. And normal ST and T wave pattern with no evidence of acute ischemia or other acute findings         Анна Boo, SHAGGY-CNP  03/06/24 [id number]

## 2024-03-15 ENCOUNTER — OFFICE VISIT (OUTPATIENT)
Dept: OPHTHALMOLOGY | Facility: CLINIC | Age: 59
End: 2024-03-15
Payer: COMMERCIAL

## 2024-03-15 DIAGNOSIS — H53.453 ALTITUDINAL SCOTOMA OF BOTH EYES: ICD-10-CM

## 2024-03-15 DIAGNOSIS — H47.293 PARTIAL OPTIC ATROPHY OF BOTH EYES: Primary | ICD-10-CM

## 2024-03-15 PROCEDURE — 99214 OFFICE O/P EST MOD 30 MIN: CPT | Performed by: PSYCHIATRY & NEUROLOGY

## 2024-03-15 PROCEDURE — 92133 CPTRZD OPH DX IMG PST SGM ON: CPT | Performed by: PSYCHIATRY & NEUROLOGY

## 2024-03-15 PROCEDURE — 92083 EXTENDED VISUAL FIELD XM: CPT | Performed by: PSYCHIATRY & NEUROLOGY

## 2024-03-15 ASSESSMENT — CONF VISUAL FIELD
OS_INFERIOR_NASAL_RESTRICTION: 1
OD_INFERIOR_TEMPORAL_RESTRICTION: 1
OD_INFERIOR_NASAL_RESTRICTION: 1

## 2024-03-15 ASSESSMENT — ENCOUNTER SYMPTOMS
RESPIRATORY NEGATIVE: 0
CARDIOVASCULAR NEGATIVE: 0
GASTROINTESTINAL NEGATIVE: 0
NEUROLOGICAL NEGATIVE: 0
ALLERGIC/IMMUNOLOGIC NEGATIVE: 0
ENDOCRINE NEGATIVE: 0
EYES NEGATIVE: 1
CONSTITUTIONAL NEGATIVE: 0
PSYCHIATRIC NEGATIVE: 0
HEMATOLOGIC/LYMPHATIC NEGATIVE: 0
MUSCULOSKELETAL NEGATIVE: 0

## 2024-03-15 ASSESSMENT — VISUAL ACUITY
OD_CC+: -3
CORRECTION_TYPE: GLASSES
OS_CC+: -3
METHOD: SNELLEN - LINEAR
OS_CC: 20/20
OD_CC: 20/20

## 2024-03-15 ASSESSMENT — CUP TO DISC RATIO
OS_RATIO: 0.2
OD_RATIO: 0.2

## 2024-03-15 ASSESSMENT — TONOMETRY
OD_IOP_MMHG: 22
IOP_METHOD: GOLDMANN APPLANATION
OS_IOP_MMHG: 20

## 2024-03-15 ASSESSMENT — EXTERNAL EXAM - RIGHT EYE: OD_EXAM: NORMAL

## 2024-03-15 ASSESSMENT — EXTERNAL EXAM - LEFT EYE: OS_EXAM: NORMAL

## 2024-03-15 ASSESSMENT — SLIT LAMP EXAM - LIDS
COMMENTS: NORMAL
COMMENTS: NORMAL

## 2024-03-15 NOTE — PROGRESS NOTES
Assessment and Plan    12/23/2023 MRI brain & orbits with contrast & MRV head, which I personally reviewed previously, show no lesion.    12/23/2023 ESR 5. CRP 0.23 mg/dL. NMO/AQP4 & MOG antibodies, Toxoplasma IgG & IgM, Bartonella abs, syphilis, Lyme abs negative/wnl. ACE low.  11/29/2023 CRP 0.37 mg/dL.  07/21/2023 ESR 6. CRP 0.20 mg/dL.  10/09/2019 ESR 15. CRP 0.47 mg/dL.    03/15/2024 OCT RNFL OD 54 & OS 79 with S thinning. (Stable right and early superior thinning after prior edema left)  1/2/2024 OCT RNFL OD 55 & . (Similar to small progression OD & new edema OS)  10/20/2023 OCT RNFL OD 63 with S & N thinning & . (Lower right eye (OD) & stable left eye (OS))  08/25/2023 OCT RNFL  & . (lower OD & stable OS)  07/24/2023 OCT RNFL  & .    03/15/2024 HVF 24-2 OD fovea 37, inferior altitudinal MD -15.90 & OS fovea 35, wnl MD -17.03.  1/2/2024 HVF 24-2 OD fovea 38, inferior altitudinal MD -18.28 & OS fovea 37, inferior altitudinal MD -16.76.  10/20/2023 HVF 24-2 OD fovea 37, inferior altitudinal MD -17.23 & OS fovea 39, wnl MD +0.03.  08/25/2023 HVF 24-2 OD fovea 38, inferior altitudinal MD -16.45 & OS fovea 37, wnl MD -0.60.  07/24/2023 HVF 24-2 OD fovea 36, inferior altitudinal & superior nasal step MD -16.67 & OS fovea 38, wnl MD -0.71.    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    Vision has been stable with evolution of prior right optic disc edema to sector optic atrophy consistent with non-arteritic anterior ischemic optic neuropathy after left non-arteritic anterior ischemic optic neuropathy. The previous evaluation for optic neuritis, neuro-retinitis and other causes was negative. 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 Vision is expected to stay  stable. I do advise secondary prevention for other vasculopathic diseases.    Plan    Vasculopathic risk factor control including treatment of JERI.    Follow up in 4-6 months with HVF & OCT. (dilated 1/2/2024)

## 2024-03-26 ENCOUNTER — APPOINTMENT (OUTPATIENT)
Dept: PRIMARY CARE | Facility: CLINIC | Age: 59
End: 2024-03-26
Payer: COMMERCIAL

## 2024-03-29 DIAGNOSIS — I10 ESSENTIAL HYPERTENSION: ICD-10-CM

## 2024-03-29 DIAGNOSIS — E11.9 TYPE 2 DIABETES MELLITUS WITHOUT COMPLICATION, WITH LONG-TERM CURRENT USE OF INSULIN (MULTI): ICD-10-CM

## 2024-03-29 DIAGNOSIS — Z79.4 TYPE 2 DIABETES MELLITUS WITHOUT COMPLICATION, WITH LONG-TERM CURRENT USE OF INSULIN (MULTI): ICD-10-CM

## 2024-03-29 RX ORDER — METOPROLOL SUCCINATE 25 MG/1
TABLET, EXTENDED RELEASE ORAL
Qty: 30 TABLET | Refills: 0 | Status: SHIPPED | OUTPATIENT
Start: 2024-03-29 | End: 2024-05-06

## 2024-03-29 RX ORDER — SUB-Q INSULIN DEVICE, 40 UNIT
EACH MISCELLANEOUS
Qty: 90 KIT | Refills: 1 | Status: SHIPPED | OUTPATIENT
Start: 2024-03-29

## 2024-04-02 ENCOUNTER — APPOINTMENT (OUTPATIENT)
Dept: PRIMARY CARE | Facility: CLINIC | Age: 59
End: 2024-04-02
Payer: COMMERCIAL

## 2024-04-26 ENCOUNTER — APPOINTMENT (OUTPATIENT)
Dept: OPHTHALMOLOGY | Facility: CLINIC | Age: 59
End: 2024-04-26
Payer: COMMERCIAL

## 2024-04-26 ENCOUNTER — OFFICE VISIT (OUTPATIENT)
Dept: PRIMARY CARE | Facility: CLINIC | Age: 59
End: 2024-04-26
Payer: COMMERCIAL

## 2024-04-26 VITALS
TEMPERATURE: 98.3 F | RESPIRATION RATE: 16 BRPM | SYSTOLIC BLOOD PRESSURE: 148 MMHG | WEIGHT: 248.4 LBS | BODY MASS INDEX: 35.56 KG/M2 | OXYGEN SATURATION: 95 % | HEART RATE: 95 BPM | HEIGHT: 70 IN | DIASTOLIC BLOOD PRESSURE: 83 MMHG

## 2024-04-26 DIAGNOSIS — L02.91 ABSCESS: ICD-10-CM

## 2024-04-26 DIAGNOSIS — L02.219 CUTANEOUS ABSCESS OF TRUNK, UNSPECIFIED SITE OF TRUNK: Primary | ICD-10-CM

## 2024-04-26 DIAGNOSIS — L08.9 SKIN INFECTION: ICD-10-CM

## 2024-04-26 DIAGNOSIS — E66.01 CLASS 2 SEVERE OBESITY WITH SERIOUS COMORBIDITY AND BODY MASS INDEX (BMI) OF 35.0 TO 35.9 IN ADULT, UNSPECIFIED OBESITY TYPE (MULTI): ICD-10-CM

## 2024-04-26 PROCEDURE — 99213 OFFICE O/P EST LOW 20 MIN: CPT | Performed by: NURSE PRACTITIONER

## 2024-04-26 RX ORDER — MUPIROCIN CALCIUM 20 MG/G
CREAM TOPICAL 3 TIMES DAILY
Qty: 30 G | Refills: 0 | Status: SHIPPED | OUTPATIENT
Start: 2024-04-26 | End: 2024-05-06

## 2024-04-26 RX ORDER — DOXYCYCLINE 100 MG/1
100 CAPSULE ORAL 2 TIMES DAILY
Qty: 20 CAPSULE | Refills: 0 | Status: SHIPPED | OUTPATIENT
Start: 2024-04-26 | End: 2024-05-06

## 2024-04-26 ASSESSMENT — PATIENT HEALTH QUESTIONNAIRE - PHQ9
2. FEELING DOWN, DEPRESSED OR HOPELESS: NOT AT ALL
2. FEELING DOWN, DEPRESSED OR HOPELESS: NOT AT ALL
SUM OF ALL RESPONSES TO PHQ9 QUESTIONS 1 AND 2: 0
1. LITTLE INTEREST OR PLEASURE IN DOING THINGS: NOT AT ALL
1. LITTLE INTEREST OR PLEASURE IN DOING THINGS: NOT AT ALL
SUM OF ALL RESPONSES TO PHQ9 QUESTIONS 1 AND 2: 0

## 2024-04-26 ASSESSMENT — ENCOUNTER SYMPTOMS
OCCASIONAL FEELINGS OF UNSTEADINESS: 0
LOSS OF SENSATION IN FEET: 0
DEPRESSION: 0

## 2024-04-26 NOTE — PROGRESS NOTES
"Subjective   Patient ID: Paulo Roman is a 59 y.o. male who presents for Rash.      Pt has a rash on right lower abdomen, that was itchy and now its sore, redness and swelling.    Sx started yesterday.    Took nothing for Sx.    HPI     Review of Systems    Objective   /83 Comment: auto  Pulse 95   Temp 36.8 °C (98.3 °F)   Resp 16   Ht 1.778 m (5' 10\")   Wt 113 kg (248 lb 6.4 oz)   SpO2 95%   BMI 35.64 kg/m²     Physical Exam    Assessment/Plan          "

## 2024-04-26 NOTE — PATIENT INSTRUCTIONS
DISCHARGE SUMMARY:   Patient seen and examined. Diagnosis, differential diagnosis, treatment options, and probable complications discussed and explained to patient. Patient to take medication/s associated with today's consultation. Patient may also take OTC analgesic for pain if needed. Patient advised on daily warm compress, cleaning and care. Patient was advised to come back if there is worsening or persistent symptoms.     Patient advised to follow up in 5- 7 days. Patient verbalized understanding about plan of care.

## 2024-04-26 NOTE — PROGRESS NOTES
"Subjective   Patient ID: Paulo Roman is a 59 y.o. male who is with complaint of \"infected boil\" on the skin of the RLQ of the abdomen on the belt line.     HPI  Patient is a 59 y.o. male who CONSULTED AT [facility] today. Patient is with complaint of \"infected boil\" on the skin of the RLQ of the abdomen on the belt line. Patient states condition started yesterday with gradually enlarging painful \"boil\" on the skin of the said area. he states that the bump progressively got bigger, more red, more painful, and more tender today. This was not accompanied by any fever nor chills. He states that he does not recall any injury nor any particular precipitating event.    Review of Systems  General: no weight loss, generally healthy, no fatigue  Head:  no headaches / sinus pain, no vertigo, no injury  Eyes: no diplopia, no tearing, no pain,   Ears: no change in hearing, no tinnitus, no bleeding, no vertigo  Mouth:  no dental difficulties, no gingival bleeding, no sore throat, no loss of sense of taste  Nose: no congestion, no  discharge, no bleeding, no obstruction, no loss of sense of smell  Neck: no stiffness, no pain, no tenderness, no masses, no bruit  Pulmonary: no dyspnea, no wheezing, no hemoptysis, no cough  Cardiovascular: no chest pain, no palpitations, no syncope, no orthopnea  Gastrointestinal: no change in appetite, no dysphagia, no abdominal pains, no diarrhea, no emesis, no melena  Genito Urinary: no dysuria, no urinary urgency, no nocturia, no incontinence, no change in nature of urine  Musculoskeletal: no muscle ache, no joint pain, no limitation of range of motion, no paresthesia, no numbness  Skin: (+) \"infected boil\" on the skin of the RLQ of the abdomen on the belt line  Constitutional: no fever, no chills, no night sweats    Objective   Physical Exam  General: ambulatory, in no acute distress  Abdomen: soft, non-tender, normoactive bowel sounds, no mass " palpated  Musculoskeletal: no limitation of range of motion, no paralysis, no deformity  Extremities: full and equal peripheral pulses, no edema,  Skin: (+) soft and tender mass on the RLQ of the abdomen on the belt line, mass is about 2 cm x 2 cm, soft, erythematous, and tender, but with no bleeding nor discharge, the mass is not ruptured.     Assessment/Plan   Problem List Items Addressed This Visit             ICD-10-CM    BMI 35.0-35.9,adult Z68.35     Other Visit Diagnoses         Codes    Cutaneous abscess of trunk, unspecified site of trunk    -  Primary L02.219    Relevant Medications    doxycycline (Vibramycin) 100 mg capsule    Skin infection     L08.9    Relevant Medications    doxycycline (Vibramycin) 100 mg capsule    Abscess     L02.91    Relevant Medications    doxycycline (Vibramycin) 100 mg capsule    Class 2 severe obesity with serious comorbidity and body mass index (BMI) of 35.0 to 35.9 in adult, unspecified obesity type (Multi)     E66.01, Z68.35        DISCHARGE SUMMARY:   Patient seen and examined. Diagnosis, differential diagnosis, treatment options, and probable complications discussed and explained to patient. Patient to take medication/s associated with today's consultation. Patient may also take OTC analgesic for pain if needed. Patient advised on daily warm compress, cleaning and care. Patient was advised to come back if there is worsening or persistent symptoms.     Patient advised to follow up in 5- 7 days. Patient verbalized understanding about plan of care.           SHAGGY Elias-CNP 04/26/24 10:17 AM

## 2024-05-05 DIAGNOSIS — E11.9 TYPE 2 DIABETES MELLITUS WITHOUT COMPLICATION, WITH LONG-TERM CURRENT USE OF INSULIN (MULTI): ICD-10-CM

## 2024-05-05 DIAGNOSIS — I10 ESSENTIAL HYPERTENSION: ICD-10-CM

## 2024-05-05 DIAGNOSIS — Z79.4 TYPE 2 DIABETES MELLITUS WITHOUT COMPLICATION, WITH LONG-TERM CURRENT USE OF INSULIN (MULTI): ICD-10-CM

## 2024-05-06 DIAGNOSIS — E11.9 TYPE 2 DIABETES MELLITUS WITHOUT COMPLICATION, WITH LONG-TERM CURRENT USE OF INSULIN (MULTI): Primary | ICD-10-CM

## 2024-05-06 DIAGNOSIS — Z79.4 TYPE 2 DIABETES MELLITUS WITHOUT COMPLICATION, WITH LONG-TERM CURRENT USE OF INSULIN (MULTI): Primary | ICD-10-CM

## 2024-05-06 RX ORDER — INSULIN LISPRO 100 [IU]/ML
INJECTION, SOLUTION INTRAVENOUS; SUBCUTANEOUS
Qty: 30 ML | Refills: 2 | OUTPATIENT
Start: 2024-05-06

## 2024-05-06 RX ORDER — METOPROLOL SUCCINATE 25 MG/1
TABLET, EXTENDED RELEASE ORAL
Qty: 30 TABLET | Refills: 5 | Status: SHIPPED | OUTPATIENT
Start: 2024-05-06

## 2024-05-06 RX ORDER — BLOOD-GLUCOSE SENSOR
EACH MISCELLANEOUS
Qty: 3 EACH | Refills: 8 | Status: SHIPPED | OUTPATIENT
Start: 2024-05-06

## 2024-05-06 NOTE — TELEPHONE ENCOUNTER
Rx Refill Request     Name: Paulo GONZALEZ Abel  :  1965   Medication Name:  Humalog  Specific Pharmacy location:  [facility]   Date of last appointment:  2023   Date of next appointment:  7/10/2024   Best number to reach patient:  [phone]

## 2024-05-07 RX ORDER — MUPIROCIN 20 MG/G
OINTMENT TOPICAL
COMMUNITY
Start: 2024-04-26

## 2024-05-07 RX ORDER — INSULIN LISPRO 100 [IU]/ML
INJECTION, SOLUTION INTRAVENOUS; SUBCUTANEOUS
Qty: 30 ML | Refills: 0 | Status: SHIPPED | OUTPATIENT
Start: 2024-05-07

## 2024-05-07 NOTE — TELEPHONE ENCOUNTER
Patient aware and scheduled for 5/10. He is aware he needs labs and is asking for the insulin lispro (HumaLOG U-100 Insulin) 100 unit/mL injection to be called in   Please Advise

## 2024-05-10 ENCOUNTER — APPOINTMENT (OUTPATIENT)
Dept: PRIMARY CARE | Facility: CLINIC | Age: 59
End: 2024-05-10
Payer: COMMERCIAL

## 2024-05-10 ENCOUNTER — LAB (OUTPATIENT)
Dept: LAB | Facility: LAB | Age: 59
End: 2024-05-10
Payer: COMMERCIAL

## 2024-05-10 DIAGNOSIS — E11.9 TYPE 2 DIABETES MELLITUS WITHOUT COMPLICATION, WITH LONG-TERM CURRENT USE OF INSULIN (MULTI): ICD-10-CM

## 2024-05-10 DIAGNOSIS — E78.2 MIXED HYPERLIPIDEMIA: ICD-10-CM

## 2024-05-10 DIAGNOSIS — I10 ESSENTIAL HYPERTENSION: ICD-10-CM

## 2024-05-10 DIAGNOSIS — Z79.4 TYPE 2 DIABETES MELLITUS WITHOUT COMPLICATION, WITH LONG-TERM CURRENT USE OF INSULIN (MULTI): ICD-10-CM

## 2024-05-10 LAB
ALBUMIN SERPL BCP-MCNC: 4.4 G/DL (ref 3.4–5)
ALP SERPL-CCNC: 61 U/L (ref 33–120)
ALT SERPL W P-5'-P-CCNC: 23 U/L (ref 10–52)
ANION GAP SERPL CALC-SCNC: 15 MMOL/L (ref 10–20)
AST SERPL W P-5'-P-CCNC: 17 U/L (ref 9–39)
BILIRUB SERPL-MCNC: 0.4 MG/DL (ref 0–1.2)
BUN SERPL-MCNC: 18 MG/DL (ref 6–23)
CALCIUM SERPL-MCNC: 8.8 MG/DL (ref 8.6–10.3)
CHLORIDE SERPL-SCNC: 104 MMOL/L (ref 98–107)
CHOLEST SERPL-MCNC: 322 MG/DL (ref 0–199)
CHOLESTEROL/HDL RATIO: 9.9
CO2 SERPL-SCNC: 27 MMOL/L (ref 21–32)
CREAT SERPL-MCNC: 0.94 MG/DL (ref 0.5–1.3)
EGFRCR SERPLBLD CKD-EPI 2021: >90 ML/MIN/1.73M*2
EST. AVERAGE GLUCOSE BLD GHB EST-MCNC: 223 MG/DL
GLUCOSE SERPL-MCNC: 96 MG/DL (ref 74–99)
HBA1C MFR BLD: 9.4 %
HDLC SERPL-MCNC: 32.6 MG/DL
LDLC SERPL CALC-MCNC: ABNORMAL MG/DL
NON HDL CHOLESTEROL: 289 MG/DL (ref 0–149)
POTASSIUM SERPL-SCNC: 3.5 MMOL/L (ref 3.5–5.3)
PROT SERPL-MCNC: 6.6 G/DL (ref 6.4–8.2)
SODIUM SERPL-SCNC: 142 MMOL/L (ref 136–145)
TRIGL SERPL-MCNC: 689 MG/DL (ref 0–149)
VLDL: ABNORMAL

## 2024-05-10 PROCEDURE — 80061 LIPID PANEL: CPT

## 2024-05-10 PROCEDURE — 80053 COMPREHEN METABOLIC PANEL: CPT

## 2024-05-10 PROCEDURE — 83036 HEMOGLOBIN GLYCOSYLATED A1C: CPT

## 2024-05-10 PROCEDURE — 36415 COLL VENOUS BLD VENIPUNCTURE: CPT

## 2024-05-13 ENCOUNTER — OFFICE VISIT (OUTPATIENT)
Dept: PRIMARY CARE | Facility: CLINIC | Age: 59
End: 2024-05-13
Payer: COMMERCIAL

## 2024-05-13 VITALS
OXYGEN SATURATION: 93 % | BODY MASS INDEX: 35.71 KG/M2 | RESPIRATION RATE: 18 BRPM | HEART RATE: 90 BPM | DIASTOLIC BLOOD PRESSURE: 80 MMHG | HEIGHT: 70 IN | WEIGHT: 249.4 LBS | SYSTOLIC BLOOD PRESSURE: 124 MMHG | TEMPERATURE: 97.4 F

## 2024-05-13 DIAGNOSIS — F33.1 MODERATE EPISODE OF RECURRENT MAJOR DEPRESSIVE DISORDER (MULTI): ICD-10-CM

## 2024-05-13 DIAGNOSIS — Z79.4 TYPE 2 DIABETES MELLITUS WITHOUT COMPLICATION, WITH LONG-TERM CURRENT USE OF INSULIN (MULTI): ICD-10-CM

## 2024-05-13 DIAGNOSIS — F41.1 GENERALIZED ANXIETY DISORDER: ICD-10-CM

## 2024-05-13 DIAGNOSIS — E66.01 CLASS 2 SEVERE OBESITY DUE TO EXCESS CALORIES WITH SERIOUS COMORBIDITY AND BODY MASS INDEX (BMI) OF 35.0 TO 35.9 IN ADULT (MULTI): ICD-10-CM

## 2024-05-13 DIAGNOSIS — E78.2 MIXED HYPERLIPIDEMIA: ICD-10-CM

## 2024-05-13 DIAGNOSIS — Z00.00 HEALTHCARE MAINTENANCE: Primary | ICD-10-CM

## 2024-05-13 DIAGNOSIS — E11.9 TYPE 2 DIABETES MELLITUS WITHOUT COMPLICATION, WITH LONG-TERM CURRENT USE OF INSULIN (MULTI): ICD-10-CM

## 2024-05-13 DIAGNOSIS — I10 ESSENTIAL HYPERTENSION: ICD-10-CM

## 2024-05-13 DIAGNOSIS — Z12.5 ENCOUNTER FOR SCREENING FOR MALIGNANT NEOPLASM OF PROSTATE: ICD-10-CM

## 2024-05-13 PROBLEM — E66.812 CLASS 2 SEVERE OBESITY DUE TO EXCESS CALORIES WITH SERIOUS COMORBIDITY AND BODY MASS INDEX (BMI) OF 35.0 TO 35.9 IN ADULT: Status: ACTIVE | Noted: 2023-12-27

## 2024-05-13 PROCEDURE — 99396 PREV VISIT EST AGE 40-64: CPT | Performed by: FAMILY MEDICINE

## 2024-05-13 PROCEDURE — 3008F BODY MASS INDEX DOCD: CPT | Performed by: FAMILY MEDICINE

## 2024-05-13 PROCEDURE — 3074F SYST BP LT 130 MM HG: CPT | Performed by: FAMILY MEDICINE

## 2024-05-13 PROCEDURE — 4010F ACE/ARB THERAPY RXD/TAKEN: CPT | Performed by: FAMILY MEDICINE

## 2024-05-13 PROCEDURE — 3078F DIAST BP <80 MM HG: CPT | Performed by: FAMILY MEDICINE

## 2024-05-13 PROCEDURE — 1036F TOBACCO NON-USER: CPT | Performed by: FAMILY MEDICINE

## 2024-05-13 PROCEDURE — 3046F HEMOGLOBIN A1C LEVEL >9.0%: CPT | Performed by: FAMILY MEDICINE

## 2024-05-13 RX ORDER — DAPAGLIFLOZIN 10 MG/1
10 TABLET, FILM COATED ORAL
Qty: 30 TABLET | Refills: 3 | Status: SHIPPED | OUTPATIENT
Start: 2024-05-13

## 2024-05-13 RX ORDER — ICOSAPENT ETHYL 1 G/1
2 CAPSULE ORAL 2 TIMES DAILY
Qty: 120 CAPSULE | Refills: 5 | Status: SHIPPED | OUTPATIENT
Start: 2024-05-13

## 2024-05-13 RX ORDER — ESCITALOPRAM OXALATE 20 MG/1
20 TABLET ORAL DAILY
Qty: 30 TABLET | Refills: 2 | Status: SHIPPED | OUTPATIENT
Start: 2024-05-13

## 2024-05-13 RX ORDER — INSULIN GLARGINE 100 [IU]/ML
INJECTION, SOLUTION SUBCUTANEOUS
Qty: 30 ML | Refills: 3 | Status: SHIPPED | OUTPATIENT
Start: 2024-05-13

## 2024-05-13 RX ORDER — LOSARTAN POTASSIUM 25 MG/1
25 TABLET ORAL DAILY
Qty: 30 TABLET | Refills: 3 | Status: SHIPPED | OUTPATIENT
Start: 2024-05-13 | End: 2025-05-13

## 2024-05-13 RX ORDER — TRAZODONE HYDROCHLORIDE 50 MG/1
50 TABLET ORAL NIGHTLY
Qty: 30 TABLET | Refills: 2 | Status: SHIPPED | OUTPATIENT
Start: 2024-05-13

## 2024-05-13 ASSESSMENT — ENCOUNTER SYMPTOMS
CONSTIPATION: 0
NUMBNESS: 0
HEMATURIA: 0
HEADACHES: 0
RHINORRHEA: 0
POLYDIPSIA: 0
VISUAL CHANGE: 1
FREQUENCY: 0
DIARRHEA: 0
ABDOMINAL PAIN: 0
EYE REDNESS: 0
WEAKNESS: 0
DYSURIA: 0
EYE DISCHARGE: 0
DIZZINESS: 0
FEVER: 0
POLYPHAGIA: 0
WEIGHT LOSS: 0
SHORTNESS OF BREATH: 0
TREMORS: 0
WHEEZING: 0
EYE PAIN: 0
CHILLS: 0
FATIGUE: 0
VOMITING: 0
SORE THROAT: 0
PALPITATIONS: 0
COUGH: 0

## 2024-05-13 ASSESSMENT — PATIENT HEALTH QUESTIONNAIRE - PHQ9
10. IF YOU CHECKED OFF ANY PROBLEMS, HOW DIFFICULT HAVE THESE PROBLEMS MADE IT FOR YOU TO DO YOUR WORK, TAKE CARE OF THINGS AT HOME, OR GET ALONG WITH OTHER PEOPLE: NOT DIFFICULT AT ALL
SUM OF ALL RESPONSES TO PHQ9 QUESTIONS 1 AND 2: 1
1. LITTLE INTEREST OR PLEASURE IN DOING THINGS: NOT AT ALL
2. FEELING DOWN, DEPRESSED OR HOPELESS: SEVERAL DAYS

## 2024-05-13 NOTE — PROGRESS NOTES
Subjective   Patient ID: Paulo Roman is a 59 y.o. male who presents for Annual Exam and Follow-up (Diabetes, Hypertension, Hyperlipidemia and labs).    Patient presents today for annual physical exam and follow-up on hypertension and hyperlipidemia. He has no chest pain, dyspnea, exertional chest pressure/discomfort, near-syncope, orthopnea, palpitations, paroxysmal nocturnal dyspnea, and syncope. He states he is unable to work anymore due to the visual field loss in his right eye and has not been taking his medication regularly. He has been seeing Dr. Edgar Torres for the vision issues.  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  He has been under a lot of stress and he has been going through a lot of financial difficulties regarding his medications.  Things have since improved and he has been able to obtain his medication more recently.  He brought his blood glucose readings which revealed improved glycemic control.    Diabetes  He presents for his follow-up diabetic visit. He has type 2 diabetes mellitus. His disease course has been worsening. There are no hypoglycemic associated symptoms. Pertinent negatives for hypoglycemia include no dizziness, headaches or tremors. Associated symptoms include visual change. Pertinent negatives for diabetes include no chest pain, no fatigue, no foot paresthesias, no foot ulcerations, no polydipsia, no polyphagia, no polyuria, no weakness and no weight loss. Risk factors for coronary artery disease include dyslipidemia, diabetes mellitus, hypertension, male sex and obesity.        Review of Systems   Constitutional:  Negative for chills, fatigue, fever and weight loss.   HENT:  Negative for congestion, ear pain, nosebleeds, rhinorrhea and sore throat.    Eyes:  Positive for visual disturbance. Negative for pain, discharge and redness.   Respiratory:  Negative for cough, shortness of breath and wheezing.   "  Cardiovascular:  Negative for chest pain, palpitations and leg swelling.   Gastrointestinal:  Negative for abdominal pain, constipation, diarrhea and vomiting.   Endocrine: Negative for polydipsia, polyphagia and polyuria.   Genitourinary:  Negative for dysuria, frequency and hematuria.   Neurological:  Negative for dizziness, tremors, weakness, numbness and headaches.       Objective   /80   Pulse 90   Temp 36.3 °C (97.4 °F)   Resp 18   Ht 1.778 m (5' 10\")   Wt 113 kg (249 lb 6.4 oz)   SpO2 93%   BMI 35.79 kg/m²     Physical Exam  Constitutional:       General: He is not in acute distress.     Appearance: Normal appearance.   HENT:      Head: Normocephalic and atraumatic.      Mouth/Throat:      Mouth: Mucous membranes are moist.      Pharynx: Oropharynx is clear. No oropharyngeal exudate or posterior oropharyngeal erythema.   Eyes:      General: No scleral icterus.     Extraocular Movements: Extraocular movements intact.      Pupils: Pupils are equal, round, and reactive to light.   Cardiovascular:      Rate and Rhythm: Normal rate and regular rhythm.      Pulses: Normal pulses.      Heart sounds: No murmur heard.     No friction rub. No gallop.   Pulmonary:      Effort: Pulmonary effort is normal.      Breath sounds: No wheezing, rhonchi or rales.   Skin:     General: Skin is warm.      Coloration: Skin is not jaundiced or pale.      Findings: No erythema or rash.   Neurological:      General: No focal deficit present.      Mental Status: He is alert and oriented to person, place, and time.      Cranial Nerves: No cranial nerve deficit.      Sensory: No sensory deficit.      Coordination: Coordination normal.      Gait: Gait normal.         Lab on 05/10/2024   Component Date Value Ref Range Status    Glucose 05/10/2024 96  74 - 99 mg/dL Final    Sodium 05/10/2024 142  136 - 145 mmol/L Final    Potassium 05/10/2024 3.5  3.5 - 5.3 mmol/L Final    Chloride 05/10/2024 104  98 - 107 mmol/L Final    " Bicarbonate 05/10/2024 27  21 - 32 mmol/L Final    Anion Gap 05/10/2024 15  10 - 20 mmol/L Final    Urea Nitrogen 05/10/2024 18  6 - 23 mg/dL Final    Creatinine 05/10/2024 0.94  0.50 - 1.30 mg/dL Final    eGFR 05/10/2024 >90  >60 mL/min/1.73m*2 Final    Calculations of estimated GFR are performed using the 2021 CKD-EPI Study Refit equation without the race variable for the IDMS-Traceable creatinine methods.  https://jasn.asnjournals.org/content/early/2021/09/22/ASN.5890444036    Calcium 05/10/2024 8.8  8.6 - 10.3 mg/dL Final    Albumin 05/10/2024 4.4  3.4 - 5.0 g/dL Final    Alkaline Phosphatase 05/10/2024 61  33 - 120 U/L Final    Total Protein 05/10/2024 6.6  6.4 - 8.2 g/dL Final    AST 05/10/2024 17  9 - 39 U/L Final    Bilirubin, Total 05/10/2024 0.4  0.0 - 1.2 mg/dL Final    ALT 05/10/2024 23  10 - 52 U/L Final    Patients treated with Sulfasalazine may generate falsely decreased results for ALT.    Cholesterol 05/10/2024 322 (H)  0 - 199 mg/dL Final          Age      Desirable   Borderline High   High     0-19 Y     0 - 169       170 - 199     >/= 200    20-24 Y     0 - 189       190 - 224     >/= 225         >24 Y     0 - 199       200 - 239     >/= 240   **All ranges are based on fasting samples. Specific   therapeutic targets will vary based on patient-specific   cardiac risk.    Pediatric guidelines reference:Pediatrics 2011, 128(S5).Adult guidelines reference: NCEP ATPIII Guidelines,DAMIR 2001, 258:2486-97    Venipuncture immediately after or during the administration of Metamizole may lead to falsely low results. Testing should be performed immediately prior to Metamizole dosing.    HDL-Cholesterol 05/10/2024 32.6  mg/dL Final      Age       Very Low   Low     Normal    High    0-19 Y    < 35      < 40     40-45     ----  20-24 Y    ----     < 40      >45      ----        >24 Y      ----     < 40     40-60      >60      Cholesterol/HDL Ratio 05/10/2024 9.9   Final      Ref Values  Desirable  < 3.4  High  Risk  > 5.0    LDL Calculated 05/10/2024    Final    The calculation of LDL and VLDL are inaccurate when the Triglycerides are greater than 400 mg/dL or when the patient is non-fasting. If LDL measurement is necessary contact the testing laboratory for an alternative LDL assay.                                  Near   Borderline      AGE      Desirable  Optimal    High     High     Very High     0-19 Y     0 - 109     ---    110-129   >/= 130     ----    20-24 Y     0 - 119     ---    120-159   >/= 160     ----      >24 Y     0 -  99   100-129  130-159   160-189     >/=190      VLDL 05/10/2024    Final    Unable to calculate VLDL.    Triglycerides 05/10/2024 689 (H)  0 - 149 mg/dL Final       Age         Desirable   Borderline High   High     Very High   0 D-90 D    19 - 174         ----         ----        ----  91 D- 9 Y     0 -  74        75 -  99     >/= 100      ----    10-19 Y     0 -  89        90 - 129     >/= 130      ----    20-24 Y     0 - 114       115 - 149     >/= 150      ----         >24 Y     0 - 149       150 - 199    200- 499    >/= 500    Venipuncture immediately after or during the administration of Metamizole may lead to falsely low results. Testing should be performed immediately prior to Metamizole dosing.    Non HDL Cholesterol 05/10/2024 289 (H)  0 - 149 mg/dL Final          Age       Desirable   Borderline High   High     Very High     0-19 Y     0 - 119       120 - 144     >/= 145    >/= 160    20-24 Y     0 - 149       150 - 189     >/= 190      ----         >24 Y    30 mg/dL above LDL Cholesterol goal      Hemoglobin A1C 05/10/2024 9.4 (H)  see below % Final    Estimated Average Glucose 05/10/2024 223  Not Established mg/dL Final     Assessment/Plan   Problem List Items Addressed This Visit       Class 2 severe obesity due to excess calories with serious comorbidity and body mass index (BMI) of 35.0 to 35.9 in adult (Multi)     Continue decrease calorie diet and not more than 1500  calorie per day diet and low-fat diet.  Continue with regular exercise program.  We advised exercise at least 5 days a week for at least 45 minutes and also a minimum of 10,000 steps a day.  The detrimental effects of obesity on health were discussed.         Essential hypertension     Well-controlled, continue current medications and management.         Relevant Medications    losartan (Cozaar) 25 mg tablet    Other Relevant Orders    Follow Up In [facility] - PCP - Established    Comprehensive Metabolic Panel    Hemoglobin A1C    CBC and Auto Differential    Lipid Panel    Generalized anxiety disorder     Stable, continue current medications and management.         Relevant Medications    escitalopram (Lexapro) 20 mg tablet    Healthcare maintenance - Primary     Recommend low-cholesterol diet, low-fat diet and low-salt diet.  The need for lifelong dietary compliance in order to reduce cardiac risk is recommended.  We will also recommend regular exercise program to improve lipid balance and overall health.  Recommend decreasing fat and cholesterol in diet, increasing aerobic exercise with a goal of 4 or more days per week         Mixed hyperlipidemia     The nature of cardiac risk has been fully discussed with this patient. Discussed cardiovascular risk analysis and appropriate diet with the need for lifelong measures to reduce the risk. A regular exercise program is recommended to help achieve and maintain normal body weight, fitness and improve lipid balance. Patient education provided. They understand and agree with this course of treatment. They will return with new or worsening symptoms. Patient instructed to remain current with appropriate annual health maintenance.          Relevant Medications    icosapent ethyL (Vascepa) 1 gram capsule    Other Relevant Orders    Follow Up In [facility] - PCP - Established    Comprehensive Metabolic Panel    Hemoglobin A1C    CBC and Auto  Differential    Lipid Panel    Moderate episode of recurrent major depressive disorder (Multi)     Stable, continue current medications and management.         Relevant Medications    traZODone (Desyrel) 50 mg tablet    escitalopram (Lexapro) 20 mg tablet    Type 2 diabetes mellitus without complication, with long-term current use of insulin (Multi)     Diabetes Mellitus type II, under poor control.  1. Rx changes:   2. Education: Reviewed ‘ABCs’ of diabetes management (respective goals in parentheses):  A1C (<7), blood pressure (<130/80), and cholesterol (LDL <100).  3. Compliance at present is estimated to be poor. Efforts to improve compliance (if necessary) will be directed at dietary modifications: and increased exercise.  4. Follow up: 3 months         Relevant Medications    semaglutide (Rybelsus) 14 mg tablet tablet    insulin glargine (Basaglar KwikPen U-100 Insulin) 100 unit/mL (3 mL) pen    dapagliflozin propanediol (Farxiga) 10 mg    Other Relevant Orders    Follow Up In [facility] - PCP - Established    Comprehensive Metabolic Panel    Hemoglobin A1C    CBC and Auto Differential    Lipid Panel     Other Visit Diagnoses       Encounter for screening for malignant neoplasm of prostate        Relevant Orders    Prostate Specific Antigen, Screen          Scribe Attestation  By signing my name below, I, Migel Mullins   attest that this documentation has been prepared under the direction and in the presence of Ez Maier MD.

## 2024-05-13 NOTE — PATIENT INSTRUCTIONS
BMI was above normal measurement. Current weight: 113 kg (249 lb 6.4 oz)  Weight change since last visit (-) denotes wt loss 1 lbs   Weight loss needed to achieve BMI 25: 75.5 Lbs  Weight loss needed to achieve BMI 30: 40.8 Lbs  Advised to Increase physical activity.

## 2024-07-10 ENCOUNTER — APPOINTMENT (OUTPATIENT)
Dept: PRIMARY CARE | Facility: CLINIC | Age: 59
End: 2024-07-10
Payer: COMMERCIAL

## 2024-08-21 ENCOUNTER — APPOINTMENT (OUTPATIENT)
Dept: PRIMARY CARE | Facility: CLINIC | Age: 59
End: 2024-08-21
Payer: COMMERCIAL

## 2024-09-03 ENCOUNTER — APPOINTMENT (OUTPATIENT)
Dept: PRIMARY CARE | Facility: CLINIC | Age: 59
End: 2024-09-03
Payer: COMMERCIAL

## 2024-09-03 ENCOUNTER — TELEPHONE (OUTPATIENT)
Dept: PRIMARY CARE | Facility: CLINIC | Age: 59
End: 2024-09-03

## 2024-09-03 VITALS
OXYGEN SATURATION: 92 % | RESPIRATION RATE: 18 BRPM | HEART RATE: 92 BPM | TEMPERATURE: 97.6 F | DIASTOLIC BLOOD PRESSURE: 68 MMHG | HEIGHT: 70 IN | BODY MASS INDEX: 32.21 KG/M2 | SYSTOLIC BLOOD PRESSURE: 120 MMHG | WEIGHT: 225 LBS

## 2024-09-03 DIAGNOSIS — E11.9 TYPE 2 DIABETES MELLITUS WITHOUT COMPLICATION, WITH LONG-TERM CURRENT USE OF INSULIN (MULTI): Primary | ICD-10-CM

## 2024-09-03 DIAGNOSIS — E78.2 MIXED HYPERLIPIDEMIA: ICD-10-CM

## 2024-09-03 DIAGNOSIS — Z12.5 ENCOUNTER FOR SCREENING FOR MALIGNANT NEOPLASM OF PROSTATE: ICD-10-CM

## 2024-09-03 DIAGNOSIS — Z79.4 TYPE 2 DIABETES MELLITUS WITHOUT COMPLICATION, WITH LONG-TERM CURRENT USE OF INSULIN (MULTI): Primary | ICD-10-CM

## 2024-09-03 DIAGNOSIS — I10 ESSENTIAL HYPERTENSION: ICD-10-CM

## 2024-09-03 DIAGNOSIS — F33.1 MODERATE EPISODE OF RECURRENT MAJOR DEPRESSIVE DISORDER (MULTI): ICD-10-CM

## 2024-09-03 DIAGNOSIS — F41.1 GENERALIZED ANXIETY DISORDER: ICD-10-CM

## 2024-09-03 PROBLEM — E66.812 CLASS 2 SEVERE OBESITY DUE TO EXCESS CALORIES WITH SERIOUS COMORBIDITY AND BODY MASS INDEX (BMI) OF 35.0 TO 35.9 IN ADULT: Status: RESOLVED | Noted: 2023-12-27 | Resolved: 2024-09-03

## 2024-09-03 PROBLEM — E66.01 CLASS 2 SEVERE OBESITY DUE TO EXCESS CALORIES WITH SERIOUS COMORBIDITY AND BODY MASS INDEX (BMI) OF 35.0 TO 35.9 IN ADULT (MULTI): Status: RESOLVED | Noted: 2023-12-27 | Resolved: 2024-09-03

## 2024-09-03 PROCEDURE — 3008F BODY MASS INDEX DOCD: CPT | Performed by: FAMILY MEDICINE

## 2024-09-03 PROCEDURE — 3074F SYST BP LT 130 MM HG: CPT | Performed by: FAMILY MEDICINE

## 2024-09-03 PROCEDURE — 1036F TOBACCO NON-USER: CPT | Performed by: FAMILY MEDICINE

## 2024-09-03 PROCEDURE — 4010F ACE/ARB THERAPY RXD/TAKEN: CPT | Performed by: FAMILY MEDICINE

## 2024-09-03 PROCEDURE — 3046F HEMOGLOBIN A1C LEVEL >9.0%: CPT | Performed by: FAMILY MEDICINE

## 2024-09-03 PROCEDURE — 99214 OFFICE O/P EST MOD 30 MIN: CPT | Performed by: FAMILY MEDICINE

## 2024-09-03 PROCEDURE — 3078F DIAST BP <80 MM HG: CPT | Performed by: FAMILY MEDICINE

## 2024-09-03 RX ORDER — ESCITALOPRAM OXALATE 20 MG/1
20 TABLET ORAL DAILY
Qty: 30 TABLET | Refills: 2 | Status: SHIPPED | OUTPATIENT
Start: 2024-09-03

## 2024-09-03 RX ORDER — ICOSAPENT ETHYL 1 G/1
2 CAPSULE ORAL 2 TIMES DAILY
Qty: 120 CAPSULE | Refills: 5 | Status: SHIPPED | OUTPATIENT
Start: 2024-09-03

## 2024-09-03 RX ORDER — FENOFIBRATE 160 MG/1
160 TABLET ORAL DAILY
Qty: 90 TABLET | Refills: 1 | Status: CANCELLED | OUTPATIENT
Start: 2024-09-03 | End: 2025-09-03

## 2024-09-03 RX ORDER — BLOOD-GLUCOSE SENSOR
EACH MISCELLANEOUS
Qty: 3 EACH | Refills: 8 | Status: SHIPPED | OUTPATIENT
Start: 2024-09-03

## 2024-09-03 RX ORDER — BLOOD-GLUCOSE TRANSMITTER
EACH MISCELLANEOUS
Qty: 1 EACH | Refills: 2 | Status: SHIPPED | OUTPATIENT
Start: 2024-09-03

## 2024-09-03 RX ORDER — ATORVASTATIN CALCIUM 80 MG/1
80 TABLET, FILM COATED ORAL NIGHTLY
Qty: 30 TABLET | Refills: 2 | Status: SHIPPED | OUTPATIENT
Start: 2024-09-03

## 2024-09-03 RX ORDER — FENOFIBRATE 160 MG/1
160 TABLET ORAL DAILY
Qty: 30 TABLET | Refills: 2 | Status: SHIPPED | OUTPATIENT
Start: 2024-09-03

## 2024-09-03 RX ORDER — LOSARTAN POTASSIUM 25 MG/1
25 TABLET ORAL DAILY
Qty: 30 TABLET | Refills: 3 | Status: SHIPPED | OUTPATIENT
Start: 2024-09-03

## 2024-09-03 RX ORDER — SUB-Q INSULIN DEVICE, 40 UNIT
EACH MISCELLANEOUS
Qty: 90 KIT | Refills: 1 | Status: SHIPPED | OUTPATIENT
Start: 2024-09-03

## 2024-09-03 RX ORDER — INSULIN GLARGINE 100 [IU]/ML
INJECTION, SOLUTION SUBCUTANEOUS
Qty: 30 ML | Refills: 3 | Status: SHIPPED | OUTPATIENT
Start: 2024-09-03

## 2024-09-03 RX ORDER — DAPAGLIFLOZIN 10 MG/1
10 TABLET, FILM COATED ORAL
Qty: 30 TABLET | Refills: 3 | Status: SHIPPED | OUTPATIENT
Start: 2024-09-03

## 2024-09-03 RX ORDER — METFORMIN HYDROCHLORIDE 500 MG/1
500 TABLET ORAL
Qty: 60 TABLET | Refills: 2 | Status: SHIPPED | OUTPATIENT
Start: 2024-09-03

## 2024-09-03 RX ORDER — INSULIN LISPRO 100 [IU]/ML
INJECTION, SOLUTION INTRAVENOUS; SUBCUTANEOUS
Qty: 30 ML | Refills: 2 | Status: SHIPPED | OUTPATIENT
Start: 2024-09-03

## 2024-09-03 RX ORDER — TRAZODONE HYDROCHLORIDE 50 MG/1
50 TABLET ORAL NIGHTLY
Qty: 30 TABLET | Refills: 2 | Status: SHIPPED | OUTPATIENT
Start: 2024-09-03

## 2024-09-03 RX ORDER — ATORVASTATIN CALCIUM 80 MG/1
80 TABLET, FILM COATED ORAL NIGHTLY
Qty: 90 TABLET | Refills: 3 | Status: CANCELLED | OUTPATIENT
Start: 2024-09-03

## 2024-09-03 RX ORDER — METOPROLOL SUCCINATE 25 MG/1
25 TABLET, EXTENDED RELEASE ORAL DAILY
Qty: 30 TABLET | Refills: 5 | Status: SHIPPED | OUTPATIENT
Start: 2024-09-03

## 2024-09-03 ASSESSMENT — PATIENT HEALTH QUESTIONNAIRE - PHQ9
SUM OF ALL RESPONSES TO PHQ9 QUESTIONS 1 AND 2: 2
1. LITTLE INTEREST OR PLEASURE IN DOING THINGS: NOT AT ALL
10. IF YOU CHECKED OFF ANY PROBLEMS, HOW DIFFICULT HAVE THESE PROBLEMS MADE IT FOR YOU TO DO YOUR WORK, TAKE CARE OF THINGS AT HOME, OR GET ALONG WITH OTHER PEOPLE: NOT DIFFICULT AT ALL
2. FEELING DOWN, DEPRESSED OR HOPELESS: MORE THAN HALF THE DAYS

## 2024-09-03 ASSESSMENT — ENCOUNTER SYMPTOMS
DIARRHEA: 0
CONSTIPATION: 0
PALPITATIONS: 0
FEVER: 0
SHORTNESS OF BREATH: 0
HEMATURIA: 0
POLYPHAGIA: 0
RHINORRHEA: 0
WHEEZING: 0
ABDOMINAL PAIN: 0
VOMITING: 0
SORE THROAT: 0
FATIGUE: 0
TREMORS: 0
DIZZINESS: 0
WEIGHT LOSS: 0
COUGH: 0
NUMBNESS: 0
FREQUENCY: 0
VISUAL CHANGE: 0
CHILLS: 0
DYSURIA: 0
WEAKNESS: 0
BLURRED VISION: 0
POLYDIPSIA: 0
HEADACHES: 0

## 2024-09-03 ASSESSMENT — ANXIETY QUESTIONNAIRES
IF YOU CHECKED OFF ANY PROBLEMS ON THIS QUESTIONNAIRE, HOW DIFFICULT HAVE THESE PROBLEMS MADE IT FOR YOU TO DO YOUR WORK, TAKE CARE OF THINGS AT HOME, OR GET ALONG WITH OTHER PEOPLE: NOT DIFFICULT AT ALL
2. NOT BEING ABLE TO STOP OR CONTROL WORRYING: SEVERAL DAYS
3. WORRYING TOO MUCH ABOUT DIFFERENT THINGS: SEVERAL DAYS
5. BEING SO RESTLESS THAT IT IS HARD TO SIT STILL: NOT AT ALL
GAD7 TOTAL SCORE: 4
6. BECOMING EASILY ANNOYED OR IRRITABLE: SEVERAL DAYS
7. FEELING AFRAID AS IF SOMETHING AWFUL MIGHT HAPPEN: NOT AT ALL
1. FEELING NERVOUS, ANXIOUS, OR ON EDGE: SEVERAL DAYS
4. TROUBLE RELAXING: NOT AT ALL

## 2024-09-03 NOTE — ASSESSMENT & PLAN NOTE
"We will decrease the Losartan and Metoprolol to 0.5 tablet and have him  monitor his blood pressure. Dietary sodium restriction.  Regular aerobic exercise program is recommended to help achieve and maintain normal body weight, fitness and improve lipid balance. .  Dietary changes: Increase soluble fiber  Plant sterols 2grams per day (e.g. Benecol)  Reduce saturated fat, \"trans\" monounsaturated fatty acids, and cholesterol  "

## 2024-09-03 NOTE — TELEPHONE ENCOUNTER
We received a request for prior authorization on the patient's semaglutide (Rybelsus) 14 mg tablet  from their pharmacy. Prior authorization was submitted to insurance today. We will await their determination.

## 2024-09-03 NOTE — ASSESSMENT & PLAN NOTE
Diabetes Mellitus type II, under poor control.  1. Rx changes: We will have him start back on his medications as ordered.  2. Education: Reviewed ‘ABCs’ of diabetes management (respective goals in parentheses):  A1C (<7), blood pressure (<130/80), and cholesterol (LDL <100).  3. Compliance at present is estimated to be poor. Efforts to improve compliance (if necessary) will be directed at dietary modifications: and increased exercise.  4. Follow up: 3 months

## 2024-09-03 NOTE — PROGRESS NOTES
"Subjective   Patient ID: Paulo Roman is a 59 y.o. male who presents for Follow-up (Diabetes, Hypertension, and Hyperlipidemia).    Patient is here for follow-up on hypertension and hyperlipidemia. He has no chest pain, dyspnea, exertional chest pressure/discomfort, near-syncope, orthopnea, palpitations, paroxysmal nocturnal dyspnea, and syncope. The states he has not been taking any of his medications for the past 2 months due to not having insurance and not having any income.     Diabetes  He presents for his follow-up diabetic visit. He has type 2 diabetes mellitus. There are no hypoglycemic associated symptoms. Pertinent negatives for hypoglycemia include no dizziness, headaches or tremors. Pertinent negatives for diabetes include no blurred vision, no chest pain, no fatigue, no foot paresthesias, no foot ulcerations, no polydipsia, no polyphagia, no polyuria, no visual change, no weakness and no weight loss. Risk factors for coronary artery disease include male sex, hypertension, diabetes mellitus, dyslipidemia and obesity.     Review of Systems   Constitutional:  Negative for chills, fatigue, fever and weight loss.   HENT:  Negative for congestion, ear pain, nosebleeds, rhinorrhea and sore throat.    Eyes:  Negative for blurred vision.   Respiratory:  Negative for cough, shortness of breath and wheezing.    Cardiovascular:  Negative for chest pain, palpitations and leg swelling.   Gastrointestinal:  Negative for abdominal pain, constipation, diarrhea and vomiting.   Endocrine: Negative for polydipsia, polyphagia and polyuria.   Genitourinary:  Negative for dysuria, frequency and hematuria.   Neurological:  Negative for dizziness, tremors, weakness, numbness and headaches.       Objective   /68   Pulse 92   Temp 36.4 °C (97.6 °F)   Resp 18   Ht 1.778 m (5' 10\")   Wt 102 kg (225 lb)   SpO2 92%   BMI 32.28 kg/m²     Physical Exam  Constitutional:       General: He is not in acute distress.     " "Appearance: Normal appearance.   HENT:      Head: Normocephalic and atraumatic.      Mouth/Throat:      Mouth: Mucous membranes are moist.      Pharynx: Oropharynx is clear. No oropharyngeal exudate or posterior oropharyngeal erythema.   Eyes:      General: No scleral icterus.     Extraocular Movements: Extraocular movements intact.      Pupils: Pupils are equal, round, and reactive to light.   Cardiovascular:      Rate and Rhythm: Normal rate and regular rhythm.      Pulses: Normal pulses.      Heart sounds: No murmur heard.     No friction rub. No gallop.   Pulmonary:      Effort: Pulmonary effort is normal.      Breath sounds: No wheezing, rhonchi or rales.   Skin:     General: Skin is warm.      Coloration: Skin is not jaundiced or pale.      Findings: No erythema or rash.   Neurological:      General: No focal deficit present.      Mental Status: He is alert and oriented to person, place, and time.      Cranial Nerves: No cranial nerve deficit.      Sensory: No sensory deficit.      Coordination: Coordination normal.      Gait: Gait normal.         Assessment/Plan   Problem List Items Addressed This Visit       Essential hypertension     We will decrease the Losartan and Metoprolol to 0.5 tablet and have him  monitor his blood pressure. Dietary sodium restriction.  Regular aerobic exercise program is recommended to help achieve and maintain normal body weight, fitness and improve lipid balance. .  Dietary changes: Increase soluble fiber  Plant sterols 2grams per day (e.g. Benecol)  Reduce saturated fat, \"trans\" monounsaturated fatty acids, and cholesterol         Relevant Medications    losartan (Cozaar) 25 mg tablet    metoprolol succinate XL (Toprol-XL) 25 mg 24 hr tablet    Other Relevant Orders    Comprehensive Metabolic Panel    CBC and Auto Differential    Lipid Panel    Hemoglobin A1C    Follow Up In [facility] - PCP - Established    Generalized anxiety disorder     Stable, continue current " medications and management.         Relevant Medications    escitalopram (Lexapro) 20 mg tablet    Other Relevant Orders    Follow Up In [facility] - PCP - Established    Mixed hyperlipidemia     The nature of cardiac risk has been fully discussed with this patient. Discussed cardiovascular risk analysis and appropriate diet with the need for lifelong measures to reduce the risk. A regular exercise program is recommended to help achieve and maintain normal body weight, fitness and improve lipid balance. Patient education provided. They understand and agree with this course of treatment. They will return with new or worsening symptoms. Patient instructed to remain current with appropriate annual health maintenance.          Relevant Medications    icosapent ethyL (Vascepa) 1 gram capsule    atorvastatin (Lipitor) 80 mg tablet    fenofibrate (Triglide) 160 mg tablet    Other Relevant Orders    Comprehensive Metabolic Panel    CBC and Auto Differential    Lipid Panel    Hemoglobin A1C    Follow Up In [facility] - PCP - Established    Moderate episode of recurrent major depressive disorder (Multi)     Stable, continue current medications and management.         Relevant Medications    escitalopram (Lexapro) 20 mg tablet    traZODone (Desyrel) 50 mg tablet    Other Relevant Orders    Follow Up In [facility] - PCP - Established    Type 2 diabetes mellitus without complication, with long-term current use of insulin (Multi) - Primary     Diabetes Mellitus type II, under poor control.  1. Rx changes: We will have him start back on his medications as ordered.  2. Education: Reviewed ‘ABCs’ of diabetes management (respective goals in parentheses):  A1C (<7), blood pressure (<130/80), and cholesterol (LDL <100).  3. Compliance at present is estimated to be poor. Efforts to improve compliance (if necessary) will be directed at dietary modifications: and increased exercise.  4. Follow up: 3  months         Relevant Medications    insulin glargine (Basaglar KwikPen U-100 Insulin) 100 unit/mL (3 mL) pen    insulin lispro (HumaLOG U-100 Insulin) 100 unit/mL injection    semaglutide (Rybelsus) 14 mg tablet tablet    dapagliflozin propanediol (Farxiga) 10 mg    sub-q insulin device, 40 unit (V-GO 40) device    blood-glucose sensor (Dexcom G6 Sensor) device    blood-glucose transmitter device (Dexcom G6 Transmitter) device    metFORMIN (Glucophage) 500 mg tablet    Other Relevant Orders    Comprehensive Metabolic Panel    CBC and Auto Differential    Lipid Panel    Hemoglobin A1C    Follow Up In [facility] - PCP - Established     Other Visit Diagnoses       Encounter for screening for malignant neoplasm of prostate        Relevant Orders    Prostate Specific Antigen, Screen          Scribe Attestation  By signing my name below, I, Migel Mullins   attest that this documentation has been prepared under the direction and in the presence of Ez Maier MD.

## 2024-09-04 ENCOUNTER — TELEPHONE (OUTPATIENT)
Dept: PRIMARY CARE | Facility: CLINIC | Age: 59
End: 2024-09-04
Payer: COMMERCIAL

## 2024-09-04 NOTE — TELEPHONE ENCOUNTER
Prior Authorization for icosapent ethyL (Vascepa) 1 gram capsule  has been APPROVED.    Approval valid through 09.04.2025  Approval letter scanned into media on 09.04.2024

## 2024-09-04 NOTE — TELEPHONE ENCOUNTER
Prior Authorization for semaglutide (Rybelsus) 14 mg tablet  has been APPROVED.    Approval valid through 09.04.2025  Approval letter scanned into media on 09.04.2024

## 2024-09-05 ENCOUNTER — TELEPHONE (OUTPATIENT)
Dept: PRIMARY CARE | Facility: CLINIC | Age: 59
End: 2024-09-05
Payer: COMMERCIAL

## 2024-09-05 NOTE — TELEPHONE ENCOUNTER
Prior Authorization for insulin lispro (HumaLOG U-100 Insulin) 100 unit/mL injection  has been  DENIED due to failure to try preferred medications.    Preferred medications include the following:  Insulin Aspart inj Sol.  Pen Inj 500 unit  Humulin R U-500 Kwikpen  Apidra   Apidra Solostar  Novolin R Flexpen    Denial letter scanned into media on 09.05.2024

## 2024-09-06 ENCOUNTER — LAB (OUTPATIENT)
Dept: LAB | Facility: LAB | Age: 59
End: 2024-09-06
Payer: COMMERCIAL

## 2024-09-06 DIAGNOSIS — E78.2 MIXED HYPERLIPIDEMIA: ICD-10-CM

## 2024-09-06 DIAGNOSIS — Z79.4 TYPE 2 DIABETES MELLITUS WITHOUT COMPLICATION, WITH LONG-TERM CURRENT USE OF INSULIN (MULTI): ICD-10-CM

## 2024-09-06 DIAGNOSIS — E11.9 TYPE 2 DIABETES MELLITUS WITHOUT COMPLICATION, WITH LONG-TERM CURRENT USE OF INSULIN (MULTI): ICD-10-CM

## 2024-09-06 DIAGNOSIS — I10 ESSENTIAL HYPERTENSION: ICD-10-CM

## 2024-09-06 DIAGNOSIS — Z12.5 ENCOUNTER FOR SCREENING FOR MALIGNANT NEOPLASM OF PROSTATE: ICD-10-CM

## 2024-09-06 LAB
ALBUMIN SERPL BCP-MCNC: 4.5 G/DL (ref 3.4–5)
ALP SERPL-CCNC: 76 U/L (ref 33–120)
ALT SERPL W P-5'-P-CCNC: 16 U/L (ref 10–52)
ANION GAP SERPL CALC-SCNC: 22 MMOL/L (ref 10–20)
AST SERPL W P-5'-P-CCNC: 10 U/L (ref 9–39)
BASOPHILS # BLD MANUAL: 0.05 X10*3/UL (ref 0–0.1)
BASOPHILS NFR BLD MANUAL: 1 %
BILIRUB SERPL-MCNC: 0.5 MG/DL (ref 0–1.2)
BUN SERPL-MCNC: 12 MG/DL (ref 6–23)
CALCIUM SERPL-MCNC: 9.5 MG/DL (ref 8.6–10.3)
CHLORIDE SERPL-SCNC: 98 MMOL/L (ref 98–107)
CO2 SERPL-SCNC: 21 MMOL/L (ref 21–32)
CREAT SERPL-MCNC: 1.04 MG/DL (ref 0.5–1.3)
EGFRCR SERPLBLD CKD-EPI 2021: 84 ML/MIN/1.73M*2
EOSINOPHIL # BLD MANUAL: 0.05 X10*3/UL (ref 0–0.7)
EOSINOPHIL NFR BLD MANUAL: 1 %
ERYTHROCYTE [DISTWIDTH] IN BLOOD BY AUTOMATED COUNT: 13.8 % (ref 11.5–14.5)
GLUCOSE SERPL-MCNC: 336 MG/DL (ref 74–99)
HCT VFR BLD AUTO: 45 % (ref 41–52)
HGB BLD-MCNC: 15 G/DL (ref 13.5–17.5)
IMM GRANULOCYTES # BLD AUTO: 0.06 X10*3/UL (ref 0–0.7)
IMM GRANULOCYTES NFR BLD AUTO: 1.6 % (ref 0–0.9)
LYMPHOCYTES # BLD MANUAL: 1.4 X10*3/UL (ref 1.2–4.8)
LYMPHOCYTES NFR BLD MANUAL: 28 %
MCH RBC QN AUTO: 29.9 PG (ref 26–34)
MCHC RBC AUTO-ENTMCNC: 33.3 G/DL (ref 32–36)
MCV RBC AUTO: 90 FL (ref 80–100)
MONOCYTES # BLD MANUAL: 0.25 X10*3/UL (ref 0.1–1)
MONOCYTES NFR BLD MANUAL: 5 %
NEUTS SEG # BLD MANUAL: 3.25 X10*3/UL (ref 1.2–7)
NEUTS SEG NFR BLD MANUAL: 65 %
NRBC BLD-RTO: 11.5 /100 WBCS (ref 0–0)
PLATELET # BLD AUTO: 174 X10*3/UL (ref 150–450)
POTASSIUM SERPL-SCNC: 4.2 MMOL/L (ref 3.5–5.3)
PROT SERPL-MCNC: 7.2 G/DL (ref 6.4–8.2)
PSA SERPL-MCNC: 0.3 NG/ML
RBC # BLD AUTO: 5.02 X10*6/UL (ref 4.5–5.9)
RBC MORPH BLD: NORMAL
SODIUM SERPL-SCNC: 137 MMOL/L (ref 136–145)
TOTAL CELLS COUNTED BLD: 100
WBC # BLD AUTO: 5 X10*3/UL (ref 4.4–11.3)

## 2024-09-06 PROCEDURE — 83036 HEMOGLOBIN GLYCOSYLATED A1C: CPT

## 2024-09-06 PROCEDURE — 84153 ASSAY OF PSA TOTAL: CPT

## 2024-09-06 PROCEDURE — 36415 COLL VENOUS BLD VENIPUNCTURE: CPT

## 2024-09-06 PROCEDURE — 85007 BL SMEAR W/DIFF WBC COUNT: CPT

## 2024-09-06 PROCEDURE — 80053 COMPREHEN METABOLIC PANEL: CPT

## 2024-09-06 PROCEDURE — 85027 COMPLETE CBC AUTOMATED: CPT

## 2024-09-07 LAB
EST. AVERAGE GLUCOSE BLD GHB EST-MCNC: 378 MG/DL
HBA1C MFR BLD: 14.8 %

## 2024-09-09 ENCOUNTER — TELEPHONE (OUTPATIENT)
Dept: PRIMARY CARE | Facility: CLINIC | Age: 59
End: 2024-09-09
Payer: COMMERCIAL

## 2024-09-09 ENCOUNTER — TELEPHONE (OUTPATIENT)
Dept: PRIMARY CARE | Facility: CLINIC | Age: 59
End: 2024-09-09

## 2024-09-09 DIAGNOSIS — Z79.4 TYPE 2 DIABETES MELLITUS WITHOUT COMPLICATION, WITH LONG-TERM CURRENT USE OF INSULIN (MULTI): ICD-10-CM

## 2024-09-09 DIAGNOSIS — E11.9 TYPE 2 DIABETES MELLITUS WITHOUT COMPLICATION, WITH LONG-TERM CURRENT USE OF INSULIN (MULTI): ICD-10-CM

## 2024-09-09 DIAGNOSIS — E66.01 CLASS 2 SEVERE OBESITY DUE TO EXCESS CALORIES WITH SERIOUS COMORBIDITY AND BODY MASS INDEX (BMI) OF 35.0 TO 35.9 IN ADULT (MULTI): ICD-10-CM

## 2024-09-09 DIAGNOSIS — E78.2 MIXED HYPERLIPIDEMIA: ICD-10-CM

## 2024-09-09 RX ORDER — BLOOD-GLUCOSE SENSOR
EACH MISCELLANEOUS
Qty: 2 EACH | Refills: 5 | Status: SHIPPED | OUTPATIENT
Start: 2024-09-09

## 2024-09-09 RX ORDER — BLOOD-GLUCOSE,RECEIVER,CONT
EACH MISCELLANEOUS
Qty: 1 EACH | Refills: 0 | Status: SHIPPED | OUTPATIENT
Start: 2024-09-09

## 2024-09-09 NOTE — PROGRESS NOTES
Lab unable to use previous specimen d/t insufficient quality or interfering substance. Lipd panel ordered.

## 2024-09-09 NOTE — TELEPHONE ENCOUNTER
Insurance has denied the request for a dexcom. His plan now covers the freestyle Franc system. They patient needs the system to check three times daily. I have pended the rx.

## 2024-09-09 NOTE — TELEPHONE ENCOUNTER
Eliza from  [employer] [facility] called in to let Dr. Maier know that the lipid panel could not be completed due to insufficient quality or interfering substance.

## 2024-09-10 NOTE — TELEPHONE ENCOUNTER
I spoke with his insurance regarding additional questions. PA was approved and they will fax the approval to the office

## 2024-09-11 ENCOUNTER — TELEPHONE (OUTPATIENT)
Dept: PRIMARY CARE | Facility: CLINIC | Age: 59
End: 2024-09-11
Payer: COMMERCIAL

## 2024-09-11 NOTE — TELEPHONE ENCOUNTER
Prior Authorization for FreeStyle Franc 3 Sensor device  AND Sensor has been APPROVED.    Approval valid through 09.10.2025  Approval letters scanned into media on 09.10.2024

## 2024-09-13 DIAGNOSIS — E11.9 TYPE 2 DIABETES MELLITUS WITHOUT COMPLICATION, WITH LONG-TERM CURRENT USE OF INSULIN (MULTI): ICD-10-CM

## 2024-09-13 DIAGNOSIS — Z79.4 TYPE 2 DIABETES MELLITUS WITHOUT COMPLICATION, WITH LONG-TERM CURRENT USE OF INSULIN (MULTI): ICD-10-CM

## 2024-09-13 NOTE — TELEPHONE ENCOUNTER
Rx Refill Request     Name: Paulo Roman  :  1965   Medication Name:  NOVALOG  (NEEDS TO REPLACE HUMALOG)  Specific Pharmacy location:  /[facility]   Date of last appointment:  9/3/2024   Date of next appointment:  2024   Best number to reach patient:  [phone]      INS IS RICCI TONG AND HUMALOG PA DENIED

## 2024-09-17 ENCOUNTER — APPOINTMENT (OUTPATIENT)
Dept: OPHTHALMOLOGY | Facility: CLINIC | Age: 59
End: 2024-09-17
Payer: COMMERCIAL

## 2024-09-17 DIAGNOSIS — H53.453 ALTITUDINAL SCOTOMA OF BOTH EYES: Primary | ICD-10-CM

## 2024-09-17 DIAGNOSIS — H47.293 PARTIAL OPTIC ATROPHY OF BOTH EYES: ICD-10-CM

## 2024-09-17 PROCEDURE — 92083 EXTENDED VISUAL FIELD XM: CPT | Performed by: PSYCHIATRY & NEUROLOGY

## 2024-09-17 PROCEDURE — 99214 OFFICE O/P EST MOD 30 MIN: CPT | Performed by: PSYCHIATRY & NEUROLOGY

## 2024-09-17 PROCEDURE — 92133 CPTRZD OPH DX IMG PST SGM ON: CPT | Performed by: PSYCHIATRY & NEUROLOGY

## 2024-09-17 ASSESSMENT — ENCOUNTER SYMPTOMS
NEUROLOGICAL NEGATIVE: 0
MUSCULOSKELETAL NEGATIVE: 0
GASTROINTESTINAL NEGATIVE: 0
HEMATOLOGIC/LYMPHATIC NEGATIVE: 0
RESPIRATORY NEGATIVE: 0
CONSTITUTIONAL NEGATIVE: 0
PSYCHIATRIC NEGATIVE: 0
CARDIOVASCULAR NEGATIVE: 0
EYES NEGATIVE: 1
ENDOCRINE NEGATIVE: 1
ALLERGIC/IMMUNOLOGIC NEGATIVE: 0

## 2024-09-17 ASSESSMENT — REFRACTION_WEARINGRX
OS_SPHERE: +0.25
OS_AXIS: 044
OD_ADD: +2.25
OD_CYLINDER: -1.25
OS_ADD: +2.25
OD_AXIS: 122
OD_SPHERE: +0.25
OS_CYLINDER: -0.75

## 2024-09-17 ASSESSMENT — TONOMETRY
OS_IOP_MMHG: 20
IOP_METHOD: GOLDMANN APPLANATION
OD_IOP_MMHG: 20

## 2024-09-17 ASSESSMENT — VISUAL ACUITY
METHOD: SNELLEN - LINEAR
OS_CC: 20/25
OS_PH_CC+: -1
OD_PH_CC: 20/20
CORRECTION_TYPE: GLASSES
OD_PH_CC+: -2
OS_PH_CC: 20/20
OD_CC: 20/30

## 2024-09-17 ASSESSMENT — CUP TO DISC RATIO
OD_RATIO: 0.2
OS_RATIO: 0.2

## 2024-09-17 ASSESSMENT — CONF VISUAL FIELD
OS_INFERIOR_NASAL_RESTRICTION: 1
OD_INFERIOR_NASAL_RESTRICTION: 1
OD_INFERIOR_TEMPORAL_RESTRICTION: 1

## 2024-09-17 ASSESSMENT — SLIT LAMP EXAM - LIDS
COMMENTS: NORMAL
COMMENTS: NORMAL

## 2024-09-17 ASSESSMENT — EXTERNAL EXAM - RIGHT EYE: OD_EXAM: NORMAL

## 2024-09-17 ASSESSMENT — EXTERNAL EXAM - LEFT EYE: OS_EXAM: NORMAL

## 2024-09-17 NOTE — PROGRESS NOTES
Assessment and Plan    12/23/2023 MRI brain & orbits with contrast & MRV head, which I personally reviewed previously, show no lesion.    12/23/2023 ESR 5. CRP 0.23 mg/dL. NMO/AQP4 & MOG antibodies, Toxoplasma IgG & IgM, Bartonella abs, syphilis, Lyme abs negative/wnl. ACE low.  11/29/2023 CRP 0.37 mg/dL.  07/21/2023 ESR 6. CRP 0.20 mg/dL.  10/09/2019 ESR 15. CRP 0.47 mg/dL.    09/17/2024 OCT RNFL OD 53 with S, N & borderline T & I thinning & OS 59 with S, T & borderline N thinning. (Stable OD & lower OS)  03/15/2024 OCT RNFL OD 54 & OS 79 with S thinning. (Stable right and early superior thinning after prior edema left)  1/2/2024 OCT RNFL OD 57 & . (Similar to small progression OD & new edema OS)  10/20/2023 OCT RNFL OD 63 with S & N thinning & . (Lower right eye (OD) & stable left eye (OS))  08/25/2023 OCT RNFL  & . (lower OD & stable OS)  07/24/2023 OCT RNFL  & .    09/17/2024 HVF 24-2 OD fovea 36, inferior altitudinal MD -15.20 & OS fovea 36, inferior altitudinal MD -15.09.  03/15/2024 HVF 24-2 OD fovea 37, inferior altitudinal MD -15.90 & OS fovea 35, wnl MD -17.03.  1/2/2024 HVF 24-2 OD fovea 38, inferior altitudinal MD -18.28 & OS fovea 37, inferior altitudinal MD -16.76.  10/20/2023 HVF 24-2 OD fovea 37, inferior altitudinal MD -17.23 & OS fovea 39, wnl MD +0.03.  08/25/2023 HVF 24-2 OD fovea 38, inferior altitudinal MD -16.45 & OS fovea 37, wnl MD -0.60.  07/24/2023 HVF 24-2 OD fovea 36, inferior altitudinal & superior nasal step MD -16.67 & OS fovea 38, wnl MD -0.71.    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 I do not expect further changes from this problem, and non-arteritic anterior ischemic optic neuropathy recurrence is very rare. I do recommend general secondary vasculopathic risk factor control.    Plan    Vasculopathic risk factor control including treatment of JERI.    Follow up in 4-6 months with HVF & OCT. (dilated 1/2/2024)

## 2024-09-18 RX ORDER — INSULIN ASPART 100 [IU]/ML
INJECTION, SOLUTION INTRAVENOUS; SUBCUTANEOUS
Qty: 30 ML | Refills: 2 | Status: CANCELLED | OUTPATIENT
Start: 2024-09-18

## 2024-09-18 RX ORDER — INSULIN ASPART 100 [IU]/ML
10 INJECTION, SOLUTION INTRAVENOUS; SUBCUTANEOUS
Qty: 18 ML | Refills: 2 | Status: SHIPPED | OUTPATIENT
Start: 2024-09-18

## 2024-09-18 RX ORDER — INSULIN ASPART 100 [IU]/ML
INJECTION, SOLUTION INTRAVENOUS; SUBCUTANEOUS
Qty: 30 ML | Refills: 3 | Status: CANCELLED | OUTPATIENT
Start: 2024-09-18

## 2024-09-18 NOTE — TELEPHONE ENCOUNTER
Insurance is no longer paying for the Vgo system so he needs to switch to pens. Rx pended.   Patient will need to bring his glucose log to his next appointment.

## 2024-10-28 ENCOUNTER — OFFICE VISIT (OUTPATIENT)
Dept: PRIMARY CARE | Facility: CLINIC | Age: 59
End: 2024-10-28
Payer: COMMERCIAL

## 2024-10-28 VITALS
DIASTOLIC BLOOD PRESSURE: 82 MMHG | SYSTOLIC BLOOD PRESSURE: 118 MMHG | OXYGEN SATURATION: 95 % | HEART RATE: 83 BPM | HEIGHT: 70 IN | WEIGHT: 232 LBS | RESPIRATION RATE: 16 BRPM | TEMPERATURE: 98 F | BODY MASS INDEX: 33.21 KG/M2

## 2024-10-28 DIAGNOSIS — E66.811 CLASS 1 OBESITY WITH BODY MASS INDEX (BMI) OF 33.0 TO 33.9 IN ADULT, UNSPECIFIED OBESITY TYPE, UNSPECIFIED WHETHER SERIOUS COMORBIDITY PRESENT: ICD-10-CM

## 2024-10-28 DIAGNOSIS — L02.416 CUTANEOUS ABSCESS OF LEFT LOWER EXTREMITY: Primary | ICD-10-CM

## 2024-10-28 PROCEDURE — 99213 OFFICE O/P EST LOW 20 MIN: CPT | Performed by: NURSE PRACTITIONER

## 2024-10-28 RX ORDER — DOXYCYCLINE 100 MG/1
100 CAPSULE ORAL 2 TIMES DAILY
Qty: 20 CAPSULE | Refills: 0 | Status: SHIPPED | OUTPATIENT
Start: 2024-10-28 | End: 2024-11-07

## 2024-10-28 ASSESSMENT — ENCOUNTER SYMPTOMS
DIZZINESS: 0
COUGH: 0
FEVER: 0
FATIGUE: 0
ABDOMINAL PAIN: 0
SINUS PAIN: 0
NAUSEA: 0
ROS SKIN COMMENTS: LESION
DIARRHEA: 0
WEAKNESS: 0
SORE THROAT: 0
PALPITATIONS: 0
WHEEZING: 0
CHILLS: 0
HEADACHES: 0
SHORTNESS OF BREATH: 0
CONSTIPATION: 0
SINUS PRESSURE: 0
VOMITING: 0

## 2024-10-29 ENCOUNTER — OFFICE VISIT (OUTPATIENT)
Dept: SURGERY | Facility: CLINIC | Age: 59
End: 2024-10-29
Payer: COMMERCIAL

## 2024-10-29 VITALS
WEIGHT: 231 LBS | HEIGHT: 70 IN | TEMPERATURE: 98 F | DIASTOLIC BLOOD PRESSURE: 69 MMHG | SYSTOLIC BLOOD PRESSURE: 125 MMHG | OXYGEN SATURATION: 96 % | HEART RATE: 85 BPM | BODY MASS INDEX: 33.07 KG/M2 | RESPIRATION RATE: 16 BRPM

## 2024-10-29 DIAGNOSIS — L72.3 SEBACEOUS CYST: Primary | ICD-10-CM

## 2024-10-29 PROCEDURE — 4010F ACE/ARB THERAPY RXD/TAKEN: CPT | Performed by: SURGERY

## 2024-10-29 PROCEDURE — 3008F BODY MASS INDEX DOCD: CPT | Performed by: SURGERY

## 2024-10-29 PROCEDURE — 3078F DIAST BP <80 MM HG: CPT | Performed by: SURGERY

## 2024-10-29 PROCEDURE — 1036F TOBACCO NON-USER: CPT | Performed by: SURGERY

## 2024-10-29 PROCEDURE — 3046F HEMOGLOBIN A1C LEVEL >9.0%: CPT | Performed by: SURGERY

## 2024-10-29 PROCEDURE — 99213 OFFICE O/P EST LOW 20 MIN: CPT | Performed by: SURGERY

## 2024-10-29 PROCEDURE — 3074F SYST BP LT 130 MM HG: CPT | Performed by: SURGERY

## 2024-10-29 RX ORDER — CEFAZOLIN SODIUM 2 G/100ML
2 INJECTION, SOLUTION INTRAVENOUS ONCE
OUTPATIENT
Start: 2024-10-29 | End: 2024-10-29

## 2024-10-29 ASSESSMENT — ENCOUNTER SYMPTOMS
NEUROLOGICAL NEGATIVE: 1
CARDIOVASCULAR NEGATIVE: 1
CONSTITUTIONAL NEGATIVE: 1
PSYCHIATRIC NEGATIVE: 1
ALLERGIC/IMMUNOLOGIC NEGATIVE: 1
HEMATOLOGIC/LYMPHATIC NEGATIVE: 1
MUSCULOSKELETAL NEGATIVE: 1
ENDOCRINE NEGATIVE: 1
GASTROINTESTINAL NEGATIVE: 1
RESPIRATORY NEGATIVE: 1

## 2024-12-02 ENCOUNTER — ANESTHESIA (OUTPATIENT)
Dept: OPERATING ROOM | Facility: HOSPITAL | Age: 59
End: 2024-12-02
Payer: COMMERCIAL

## 2024-12-02 ENCOUNTER — HOSPITAL ENCOUNTER (OUTPATIENT)
Facility: HOSPITAL | Age: 59
Setting detail: OUTPATIENT SURGERY
Discharge: HOME | End: 2024-12-02
Attending: SURGERY | Admitting: SURGERY
Payer: COMMERCIAL

## 2024-12-02 ENCOUNTER — ANESTHESIA EVENT (OUTPATIENT)
Dept: OPERATING ROOM | Facility: HOSPITAL | Age: 59
End: 2024-12-02
Payer: COMMERCIAL

## 2024-12-02 VITALS
DIASTOLIC BLOOD PRESSURE: 80 MMHG | BODY MASS INDEX: 33.64 KG/M2 | HEIGHT: 70 IN | SYSTOLIC BLOOD PRESSURE: 141 MMHG | WEIGHT: 235 LBS | OXYGEN SATURATION: 99 % | TEMPERATURE: 97.7 F | HEART RATE: 78 BPM | RESPIRATION RATE: 16 BRPM

## 2024-12-02 DIAGNOSIS — L72.3 SEBACEOUS CYST: Primary | ICD-10-CM

## 2024-12-02 RX ORDER — CEFAZOLIN SODIUM 2 G/100ML
2 INJECTION, SOLUTION INTRAVENOUS ONCE
Status: DISCONTINUED | OUTPATIENT
Start: 2024-12-02 | End: 2024-12-02 | Stop reason: HOSPADM

## 2024-12-02 ASSESSMENT — PAIN - FUNCTIONAL ASSESSMENT: PAIN_FUNCTIONAL_ASSESSMENT: 0-10

## 2024-12-02 ASSESSMENT — COLUMBIA-SUICIDE SEVERITY RATING SCALE - C-SSRS
2. HAVE YOU ACTUALLY HAD ANY THOUGHTS OF KILLING YOURSELF?: NO
6. HAVE YOU EVER DONE ANYTHING, STARTED TO DO ANYTHING, OR PREPARED TO DO ANYTHING TO END YOUR LIFE?: NO
1. IN THE PAST MONTH, HAVE YOU WISHED YOU WERE DEAD OR WISHED YOU COULD GO TO SLEEP AND NOT WAKE UP?: NO

## 2024-12-02 ASSESSMENT — PAIN SCALES - GENERAL: PAINLEVEL_OUTOF10: 0 - NO PAIN

## 2024-12-02 NOTE — SIGNIFICANT EVENT
Patient presented today for surgery, but has noticed that the lump on his leg is no longer there. Examined site of previous lump and found no abnormal findings. Surgery cancelled. Patient advised to return to clinic if new lump arises.      - Jaquan Lund MD  General/Acute Care Surgery

## 2024-12-03 ENCOUNTER — LAB (OUTPATIENT)
Dept: LAB | Facility: LAB | Age: 59
End: 2024-12-03
Payer: COMMERCIAL

## 2024-12-03 DIAGNOSIS — E11.9 TYPE 2 DIABETES MELLITUS WITHOUT COMPLICATION, WITH LONG-TERM CURRENT USE OF INSULIN (MULTI): ICD-10-CM

## 2024-12-03 DIAGNOSIS — Z79.4 TYPE 2 DIABETES MELLITUS WITHOUT COMPLICATION, WITH LONG-TERM CURRENT USE OF INSULIN (MULTI): ICD-10-CM

## 2024-12-03 DIAGNOSIS — E78.2 MIXED HYPERLIPIDEMIA: ICD-10-CM

## 2024-12-03 LAB
ALBUMIN SERPL BCP-MCNC: 4.7 G/DL (ref 3.4–5)
ALP SERPL-CCNC: 41 U/L (ref 33–120)
ALT SERPL W P-5'-P-CCNC: 23 U/L (ref 10–52)
ANION GAP SERPL CALC-SCNC: 15 MMOL/L (ref 10–20)
AST SERPL W P-5'-P-CCNC: 14 U/L (ref 9–39)
BILIRUB SERPL-MCNC: 0.7 MG/DL (ref 0–1.2)
BUN SERPL-MCNC: 16 MG/DL (ref 6–23)
CALCIUM SERPL-MCNC: 9.3 MG/DL (ref 8.6–10.3)
CHLORIDE SERPL-SCNC: 105 MMOL/L (ref 98–107)
CHOLEST SERPL-MCNC: 126 MG/DL (ref 0–199)
CHOLESTEROL/HDL RATIO: 3.2
CO2 SERPL-SCNC: 25 MMOL/L (ref 21–32)
CREAT SERPL-MCNC: 1.09 MG/DL (ref 0.5–1.3)
EGFRCR SERPLBLD CKD-EPI 2021: 79 ML/MIN/1.73M*2
EST. AVERAGE GLUCOSE BLD GHB EST-MCNC: 157 MG/DL
GLUCOSE SERPL-MCNC: 78 MG/DL (ref 74–99)
HBA1C MFR BLD: 7.1 %
HDLC SERPL-MCNC: 39.5 MG/DL
LDLC SERPL CALC-MCNC: 58 MG/DL
NON HDL CHOLESTEROL: 87 MG/DL (ref 0–149)
POTASSIUM SERPL-SCNC: 3.8 MMOL/L (ref 3.5–5.3)
PROT SERPL-MCNC: 6.8 G/DL (ref 6.4–8.2)
SODIUM SERPL-SCNC: 141 MMOL/L (ref 136–145)
TRIGL SERPL-MCNC: 145 MG/DL (ref 0–149)
VLDL: 29 MG/DL (ref 0–40)

## 2024-12-03 PROCEDURE — 83036 HEMOGLOBIN GLYCOSYLATED A1C: CPT

## 2024-12-03 PROCEDURE — 80061 LIPID PANEL: CPT

## 2024-12-03 PROCEDURE — 36415 COLL VENOUS BLD VENIPUNCTURE: CPT

## 2024-12-03 PROCEDURE — 80053 COMPREHEN METABOLIC PANEL: CPT

## 2024-12-05 ENCOUNTER — APPOINTMENT (OUTPATIENT)
Dept: PRIMARY CARE | Facility: CLINIC | Age: 59
End: 2024-12-05
Payer: COMMERCIAL

## 2024-12-05 VITALS
OXYGEN SATURATION: 96 % | HEART RATE: 79 BPM | RESPIRATION RATE: 16 BRPM | SYSTOLIC BLOOD PRESSURE: 122 MMHG | WEIGHT: 235.8 LBS | HEIGHT: 70 IN | TEMPERATURE: 97.4 F | DIASTOLIC BLOOD PRESSURE: 74 MMHG | BODY MASS INDEX: 33.76 KG/M2

## 2024-12-05 DIAGNOSIS — E78.2 MIXED HYPERLIPIDEMIA: ICD-10-CM

## 2024-12-05 DIAGNOSIS — H47.011 NON-ARTERITIC ANTERIOR ISCHEMIC OPTIC NEUROPATHY OF RIGHT EYE: ICD-10-CM

## 2024-12-05 DIAGNOSIS — I10 ESSENTIAL HYPERTENSION: ICD-10-CM

## 2024-12-05 DIAGNOSIS — E11.9 TYPE 2 DIABETES MELLITUS WITHOUT COMPLICATION, WITH LONG-TERM CURRENT USE OF INSULIN (MULTI): Primary | ICD-10-CM

## 2024-12-05 DIAGNOSIS — F33.1 MODERATE EPISODE OF RECURRENT MAJOR DEPRESSIVE DISORDER: ICD-10-CM

## 2024-12-05 DIAGNOSIS — Z79.4 TYPE 2 DIABETES MELLITUS WITHOUT COMPLICATION, WITH LONG-TERM CURRENT USE OF INSULIN (MULTI): Primary | ICD-10-CM

## 2024-12-05 DIAGNOSIS — F41.1 GENERALIZED ANXIETY DISORDER: ICD-10-CM

## 2024-12-05 PROBLEM — G89.29 CHRONIC BILATERAL LOW BACK PAIN WITH LEFT-SIDED SCIATICA: Status: RESOLVED | Noted: 2023-03-29 | Resolved: 2024-12-05

## 2024-12-05 PROBLEM — M54.42 CHRONIC BILATERAL LOW BACK PAIN WITH LEFT-SIDED SCIATICA: Status: RESOLVED | Noted: 2023-03-29 | Resolved: 2024-12-05

## 2024-12-05 PROBLEM — S30.0XXA CONTUSION OF LOWER BACK: Status: RESOLVED | Noted: 2023-03-29 | Resolved: 2024-12-05

## 2024-12-05 PROBLEM — U07.1 DISEASE DUE TO SEVERE ACUTE RESPIRATORY SYNDROME CORONAVIRUS 2 (SARS-COV-2): Status: RESOLVED | Noted: 2023-03-29 | Resolved: 2024-12-05

## 2024-12-05 PROBLEM — M54.9 BACK PAIN: Status: RESOLVED | Noted: 2023-03-29 | Resolved: 2024-12-05

## 2024-12-05 PROCEDURE — 3078F DIAST BP <80 MM HG: CPT | Performed by: FAMILY MEDICINE

## 2024-12-05 PROCEDURE — 4010F ACE/ARB THERAPY RXD/TAKEN: CPT | Performed by: FAMILY MEDICINE

## 2024-12-05 PROCEDURE — 99214 OFFICE O/P EST MOD 30 MIN: CPT | Performed by: FAMILY MEDICINE

## 2024-12-05 PROCEDURE — 3008F BODY MASS INDEX DOCD: CPT | Performed by: FAMILY MEDICINE

## 2024-12-05 PROCEDURE — 3048F LDL-C <100 MG/DL: CPT | Performed by: FAMILY MEDICINE

## 2024-12-05 PROCEDURE — 1036F TOBACCO NON-USER: CPT | Performed by: FAMILY MEDICINE

## 2024-12-05 PROCEDURE — 3051F HG A1C>EQUAL 7.0%<8.0%: CPT | Performed by: FAMILY MEDICINE

## 2024-12-05 PROCEDURE — 3074F SYST BP LT 130 MM HG: CPT | Performed by: FAMILY MEDICINE

## 2024-12-05 RX ORDER — INSULIN GLARGINE 100 [IU]/ML
INJECTION, SOLUTION SUBCUTANEOUS
Qty: 30 ML | Refills: 3 | Status: CANCELLED | OUTPATIENT
Start: 2024-12-05

## 2024-12-05 RX ORDER — TRAZODONE HYDROCHLORIDE 50 MG/1
50 TABLET ORAL NIGHTLY
Qty: 30 TABLET | Refills: 2 | Status: SHIPPED | OUTPATIENT
Start: 2024-12-05

## 2024-12-05 RX ORDER — INSULIN GLARGINE-YFGN 100 [IU]/ML
INJECTION, SOLUTION SUBCUTANEOUS
Qty: 30 ML | Refills: 3 | Status: SHIPPED | OUTPATIENT
Start: 2024-12-05

## 2024-12-05 RX ORDER — FENOFIBRATE 160 MG/1
160 TABLET ORAL DAILY
Qty: 30 TABLET | Refills: 2 | Status: SHIPPED | OUTPATIENT
Start: 2024-12-05

## 2024-12-05 RX ORDER — ESCITALOPRAM OXALATE 20 MG/1
20 TABLET ORAL DAILY
Qty: 30 TABLET | Refills: 2 | Status: SHIPPED | OUTPATIENT
Start: 2024-12-05

## 2024-12-05 RX ORDER — BLOOD-GLUCOSE,RECEIVER,CONT
EACH MISCELLANEOUS
Qty: 1 EACH | Refills: 0 | Status: SHIPPED | OUTPATIENT
Start: 2024-12-05

## 2024-12-05 RX ORDER — LOSARTAN POTASSIUM 25 MG/1
25 TABLET ORAL DAILY
Qty: 30 TABLET | Refills: 3 | Status: SHIPPED | OUTPATIENT
Start: 2024-12-05

## 2024-12-05 RX ORDER — METFORMIN HYDROCHLORIDE 500 MG/1
500 TABLET ORAL
Qty: 60 TABLET | Refills: 2 | Status: SHIPPED | OUTPATIENT
Start: 2024-12-05

## 2024-12-05 RX ORDER — BLOOD-GLUCOSE SENSOR
EACH MISCELLANEOUS
Qty: 2 EACH | Refills: 5 | Status: SHIPPED | OUTPATIENT
Start: 2024-12-05

## 2024-12-05 RX ORDER — DAPAGLIFLOZIN 10 MG/1
10 TABLET, FILM COATED ORAL
Qty: 30 TABLET | Refills: 3 | Status: SHIPPED | OUTPATIENT
Start: 2024-12-05

## 2024-12-05 RX ORDER — ATORVASTATIN CALCIUM 80 MG/1
80 TABLET, FILM COATED ORAL NIGHTLY
Qty: 30 TABLET | Refills: 2 | Status: SHIPPED | OUTPATIENT
Start: 2024-12-05

## 2024-12-05 ASSESSMENT — ENCOUNTER SYMPTOMS
FATIGUE: 0
FEVER: 0
POLYPHAGIA: 0
EYE REDNESS: 0
DIARRHEA: 0
POLYDIPSIA: 0
PHOTOPHOBIA: 0
RESTLESSNESS: 0
FREQUENCY: 0
VOMITING: 0
DYSURIA: 0
CHILLS: 0
NUMBNESS: 0
WEAKNESS: 0
ABDOMINAL PAIN: 0
HEADACHES: 0
SHORTNESS OF BREATH: 0
EYE PAIN: 0
RHINORRHEA: 0
WHEEZING: 0
PALPITATIONS: 0
DEPRESSED MOOD: 0
NERVOUS/ANXIOUS: 1
HEMATURIA: 0
PANIC: 0
CONSTIPATION: 0
TREMORS: 0
DIZZINESS: 0
IRRITABILITY: 0
SORE THROAT: 0
COUGH: 0

## 2024-12-05 ASSESSMENT — ANXIETY QUESTIONNAIRES
GAD7 TOTAL SCORE: 0
4. TROUBLE RELAXING: NOT AT ALL
5. BEING SO RESTLESS THAT IT IS HARD TO SIT STILL: NOT AT ALL
3. WORRYING TOO MUCH ABOUT DIFFERENT THINGS: NOT AT ALL
1. FEELING NERVOUS, ANXIOUS, OR ON EDGE: NOT AT ALL
IF YOU CHECKED OFF ANY PROBLEMS ON THIS QUESTIONNAIRE, HOW DIFFICULT HAVE THESE PROBLEMS MADE IT FOR YOU TO DO YOUR WORK, TAKE CARE OF THINGS AT HOME, OR GET ALONG WITH OTHER PEOPLE: NOT DIFFICULT AT ALL
2. NOT BEING ABLE TO STOP OR CONTROL WORRYING: NOT AT ALL
6. BECOMING EASILY ANNOYED OR IRRITABLE: NOT AT ALL
7. FEELING AFRAID AS IF SOMETHING AWFUL MIGHT HAPPEN: NOT AT ALL

## 2024-12-05 NOTE — PROGRESS NOTES
Subjective   Patient ID: Paulo Roman is a 59 y.o. male who presents for Follow-up (Diabetes, Hypertension, Hyperlipidemia, Anxiety, Depression and labs).    Patient is here for follow-up on hypertension and hyperlipidemia. He has no chest pain, dyspnea, exertional chest pressure/discomfort, near-syncope, orthopnea, palpitations, paroxysmal nocturnal dyspnea, and syncope. Taking his medication regularly with no side effects.    Diabetes  He presents for his follow-up diabetic visit. He has type 2 diabetes mellitus. His disease course has been improving. Hypoglycemia symptoms include nervousness/anxiousness. Pertinent negatives for hypoglycemia include no dizziness, headaches or tremors. Pertinent negatives for diabetes include no chest pain, no fatigue, no foot paresthesias, no foot ulcerations, no polydipsia, no polyphagia, no polyuria and no weakness. Risk factors for coronary artery disease include diabetes mellitus, hypertension, dyslipidemia, male sex and obesity. He does not see a podiatrist.Eye exam is current.   Anxiety  Presents for follow-up visit. Symptoms include nervous/anxious behavior. Patient reports no chest pain, depressed mood, dizziness, irritability, palpitations, panic, restlessness, shortness of breath or suicidal ideas. Symptoms occur most days. The severity of symptoms is mild. The patient sleeps 8 hours per night. The quality of sleep is good. Nighttime awakenings: occasional.     Compliance with medications is %.        Review of Systems   Constitutional:  Negative for chills, fatigue, fever and irritability.   HENT:  Negative for congestion, ear pain, nosebleeds, rhinorrhea and sore throat.    Eyes:  Positive for visual disturbance. Negative for photophobia, pain and redness.   Respiratory:  Negative for cough, shortness of breath and wheezing.    Cardiovascular:  Negative for chest pain, palpitations and leg swelling.   Gastrointestinal:  Negative for abdominal pain,  "constipation, diarrhea and vomiting.   Endocrine: Negative for polydipsia, polyphagia and polyuria.   Genitourinary:  Negative for dysuria, frequency and hematuria.   Neurological:  Negative for dizziness, tremors, weakness, numbness and headaches.   Psychiatric/Behavioral:  Negative for suicidal ideas. The patient is nervous/anxious.        Objective   /74   Pulse 79   Temp 36.3 °C (97.4 °F)   Resp 16   Ht 1.778 m (5' 10\")   Wt 107 kg (235 lb 12.8 oz)   SpO2 96%   BMI 33.83 kg/m²     Physical Exam  Constitutional:       General: He is not in acute distress.     Appearance: Normal appearance.   HENT:      Head: Normocephalic and atraumatic.      Mouth/Throat:      Mouth: Mucous membranes are moist.      Pharynx: Oropharynx is clear. No oropharyngeal exudate or posterior oropharyngeal erythema.   Eyes:      General: No scleral icterus.     Extraocular Movements: Extraocular movements intact.      Pupils: Pupils are equal, round, and reactive to light.   Cardiovascular:      Rate and Rhythm: Normal rate and regular rhythm.      Pulses: Normal pulses.      Heart sounds: No murmur heard.     No friction rub. No gallop.   Pulmonary:      Effort: Pulmonary effort is normal.      Breath sounds: No wheezing, rhonchi or rales.   Skin:     General: Skin is warm.      Coloration: Skin is not jaundiced or pale.      Findings: No erythema or rash.   Neurological:      General: No focal deficit present.      Mental Status: He is alert and oriented to person, place, and time.      Cranial Nerves: No cranial nerve deficit.      Sensory: No sensory deficit.      Coordination: Coordination normal.      Gait: Gait normal.         Lab on 12/03/2024   Component Date Value Ref Range Status    Cholesterol 12/03/2024 126  0 - 199 mg/dL Final          Age      Desirable   Borderline High   High     0-19 Y     0 - 169       170 - 199     >/= 200    20-24 Y     0 - 189       190 - 224     >/= 225         >24 Y     0 - 199       " 200 - 239     >/= 240   **All ranges are based on fasting samples. Specific   therapeutic targets will vary based on patient-specific   cardiac risk.    Pediatric guidelines reference:Pediatrics 2011, 128(S5).Adult guidelines reference: NCEP ATPIII Guidelines,DAMIR 2001, 258:2486-97    Venipuncture immediately after or during the administration of Metamizole may lead to falsely low results. Testing should be performed immediately prior to Metamizole dosing.    HDL-Cholesterol 12/03/2024 39.5  mg/dL Final      Age       Very Low   Low     Normal    High    0-19 Y    < 35      < 40     40-45     ----  20-24 Y    ----     < 40      >45      ----        >24 Y      ----     < 40     40-60      >60      Cholesterol/HDL Ratio 12/03/2024 3.2   Final      Ref Values  Desirable  < 3.4  High Risk  > 5.0    LDL Calculated 12/03/2024 58  <=99 mg/dL Final                                Near   Borderline      AGE      Desirable  Optimal    High     High     Very High     0-19 Y     0 - 109     ---    110-129   >/= 130     ----    20-24 Y     0 - 119     ---    120-159   >/= 160     ----      >24 Y     0 -  99   100-129  130-159   160-189     >/=190      VLDL 12/03/2024 29  0 - 40 mg/dL Final    Triglycerides 12/03/2024 145  0 - 149 mg/dL Final    Age              Desirable        Borderline         High        Very High  SEX:B           mg/dL             mg/dL               mg/dL      mg/dL  <=14D                       ----               ----        ----  15D-365D                    ----               ----        ----  1Y-9Y           0-74               75-99             >=100       ----  10Y-19Y        0-89                            >=130       ----  20Y-24Y        0-114             115-149             >=150      ----  >= 25Y         0-149             150-199             200-499    >=500      Venipuncture immediately after or during the administration of Metamizole may lead to falsely low results. Testing should  be performed immediately prior to Metamizole dosing.    Non HDL Cholesterol 12/03/2024 87  0 - 149 mg/dL Final          Age       Desirable   Borderline High   High     Very High     0-19 Y     0 - 119       120 - 144     >/= 145    >/= 160    20-24 Y     0 - 149       150 - 189     >/= 190      ----         >24 Y    30 mg/dL above LDL Cholesterol goal      Glucose 12/03/2024 78  74 - 99 mg/dL Final    Sodium 12/03/2024 141  136 - 145 mmol/L Final    Potassium 12/03/2024 3.8  3.5 - 5.3 mmol/L Final    Chloride 12/03/2024 105  98 - 107 mmol/L Final    Bicarbonate 12/03/2024 25  21 - 32 mmol/L Final    Anion Gap 12/03/2024 15  10 - 20 mmol/L Final    Urea Nitrogen 12/03/2024 16  6 - 23 mg/dL Final    Creatinine 12/03/2024 1.09  0.50 - 1.30 mg/dL Final    eGFR 12/03/2024 79  >60 mL/min/1.73m*2 Final    Calculations of estimated GFR are performed using the 2021 CKD-EPI Study Refit equation without the race variable for the IDMS-Traceable creatinine methods.  https://jasn.asnjournals.org/content/early/2021/09/22/ASN.9312060770    Calcium 12/03/2024 9.3  8.6 - 10.3 mg/dL Final    Albumin 12/03/2024 4.7  3.4 - 5.0 g/dL Final    Alkaline Phosphatase 12/03/2024 41  33 - 120 U/L Final    Total Protein 12/03/2024 6.8  6.4 - 8.2 g/dL Final    AST 12/03/2024 14  9 - 39 U/L Final    Bilirubin, Total 12/03/2024 0.7  0.0 - 1.2 mg/dL Final    ALT 12/03/2024 23  10 - 52 U/L Final    Patients treated with Sulfasalazine may generate falsely decreased results for ALT.    Hemoglobin A1C 12/03/2024 7.1 (H)  See comment % Final    Estimated Average Glucose 12/03/2024 157  Not Established mg/dL Final     Assessment/Plan   Problem List Items Addressed This Visit       Essential hypertension     Well-controlled, continue current medications and management.         Relevant Medications    losartan (Cozaar) 25 mg tablet    Other Relevant Orders    Follow Up In [facility] - PCP - Established    Comprehensive Metabolic Panel     Hemoglobin A1C    Albumin-Creatinine Ratio, Urine Random    Lipid Panel    Generalized anxiety disorder     Stable, continue current medications and management.         Relevant Medications    escitalopram (Lexapro) 20 mg tablet    Other Relevant Orders    Follow Up In [facility] - PCP - Established    Mixed hyperlipidemia     The nature of cardiac risk has been fully discussed with this patient. Discussed cardiovascular risk analysis and appropriate diet with the need for lifelong measures to reduce the risk. A regular exercise program is recommended to help achieve and maintain normal body weight, fitness and improve lipid balance. Patient education provided. They understand and agree with this course of treatment. They will return with new or worsening symptoms. Patient instructed to remain current with appropriate annual health maintenance.          Relevant Medications    atorvastatin (Lipitor) 80 mg tablet    fenofibrate (Triglide) 160 mg tablet    Other Relevant Orders    Follow Up In [facility] - PCP - Established    Comprehensive Metabolic Panel    Hemoglobin A1C    Albumin-Creatinine Ratio, Urine Random    Lipid Panel    Moderate episode of recurrent major depressive disorder     Stable, continue current medications and management.         Relevant Medications    escitalopram (Lexapro) 20 mg tablet    traZODone (Desyrel) 50 mg tablet    Other Relevant Orders    Follow Up In [facility] - PCP - Established    Type 2 diabetes mellitus without complication, with long-term current use of insulin (Multi) - Primary     Diabetes Mellitus type II, under good control.  1. Rx changes: None  2. Education: Reviewed ‘ABCs’ of diabetes management (respective goals in parentheses):  A1C (<7), blood pressure (<130/80), and cholesterol (LDL <100).  3. Compliance at present is estimated to be good. Efforts to improve compliance (if necessary) will be directed at dietary modifications: and  increased exercise.  4. 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  Continue established treatment plan and follow-up at least yearly.           Scribe Attestation  By signing my name below, IAndrey Scribe   attest that this documentation has been prepared under the direction and in the presence of Ez Maier MD.

## 2024-12-06 ENCOUNTER — TELEPHONE (OUTPATIENT)
Dept: PRIMARY CARE | Facility: CLINIC | Age: 59
End: 2024-12-06
Payer: COMMERCIAL

## 2024-12-06 NOTE — TELEPHONE ENCOUNTER
We received a request for prior authorization on the patient's FARXIGA from their pharmacy. Prior authorization was submitted to insurance today. We will await their determination.

## 2024-12-17 ENCOUNTER — APPOINTMENT (OUTPATIENT)
Dept: SURGERY | Facility: CLINIC | Age: 59
End: 2024-12-17
Payer: COMMERCIAL

## 2025-01-08 ENCOUNTER — APPOINTMENT (OUTPATIENT)
Dept: CARDIOLOGY | Facility: CLINIC | Age: 60
End: 2025-01-08
Payer: COMMERCIAL

## 2025-01-15 ENCOUNTER — APPOINTMENT (OUTPATIENT)
Dept: CARDIOLOGY | Facility: CLINIC | Age: 60
End: 2025-01-15
Payer: COMMERCIAL

## 2025-01-15 VITALS
HEART RATE: 84 BPM | BODY MASS INDEX: 35.49 KG/M2 | SYSTOLIC BLOOD PRESSURE: 138 MMHG | WEIGHT: 239.6 LBS | HEIGHT: 69 IN | DIASTOLIC BLOOD PRESSURE: 82 MMHG

## 2025-01-15 DIAGNOSIS — I25.10 ATHEROSCLEROSIS OF NATIVE CORONARY ARTERY OF NATIVE HEART WITHOUT ANGINA PECTORIS: ICD-10-CM

## 2025-01-15 DIAGNOSIS — E78.2 MIXED HYPERLIPIDEMIA: ICD-10-CM

## 2025-01-15 DIAGNOSIS — I10 ESSENTIAL HYPERTENSION: ICD-10-CM

## 2025-01-15 DIAGNOSIS — G47.33 OBSTRUCTIVE SLEEP APNEA: ICD-10-CM

## 2025-01-15 DIAGNOSIS — Z78.9 NEVER SMOKED TOBACCO: ICD-10-CM

## 2025-01-15 DIAGNOSIS — E11.9 TYPE 2 DIABETES MELLITUS WITHOUT COMPLICATION, WITH LONG-TERM CURRENT USE OF INSULIN (MULTI): ICD-10-CM

## 2025-01-15 DIAGNOSIS — Z79.4 TYPE 2 DIABETES MELLITUS WITHOUT COMPLICATION, WITH LONG-TERM CURRENT USE OF INSULIN (MULTI): ICD-10-CM

## 2025-01-15 DIAGNOSIS — I73.9 INTERMITTENT CLAUDICATION (CMS-HCC): ICD-10-CM

## 2025-01-15 PROCEDURE — 3079F DIAST BP 80-89 MM HG: CPT | Performed by: INTERNAL MEDICINE

## 2025-01-15 PROCEDURE — 1036F TOBACCO NON-USER: CPT | Performed by: INTERNAL MEDICINE

## 2025-01-15 PROCEDURE — 4010F ACE/ARB THERAPY RXD/TAKEN: CPT | Performed by: INTERNAL MEDICINE

## 2025-01-15 PROCEDURE — 3075F SYST BP GE 130 - 139MM HG: CPT | Performed by: INTERNAL MEDICINE

## 2025-01-15 PROCEDURE — 99214 OFFICE O/P EST MOD 30 MIN: CPT | Performed by: INTERNAL MEDICINE

## 2025-01-15 PROCEDURE — 3008F BODY MASS INDEX DOCD: CPT | Performed by: INTERNAL MEDICINE

## 2025-01-15 NOTE — PROGRESS NOTES
CARDIOLOGY OFFICE VISIT      CHIEF COMPLAINT  Chief Complaint   Patient presents with    Follow-up     1 year follow up        HISTORY OF PRESENT ILLNESS  Patient is a 60-year-old  male past medical history significant for coronary artery disease, non-ST elevation microinfarction, drug-eluting stent RCA June 30, 2020, diabetes mellitus, dyslipidemia, hypertension, osteoarthritis who presents for follow-up and vascular care.     Patient reports he is now disabled from work due to bilateral partial blindness.  Denies chest pain, dyspnea, palpitations, nausea, vomiting, dizziness, fever, chill, bleeding.  Patient takes an occasional walk when the weather is reasonable we warm, walking 1 mile for 20 minutes without symptoms.  He is on CPAP.     Past surgical history:  Left rotator cuff repair  Bilateral knee surgery meniscus repair     Social history:  Quit smoking in his 30s  Denies alcohol use     Family history:  Father alive 75 diabetes, hypertension, hyperlipidemia  Mother alive 70 for diabetes, hypertension, hyperlipidemia     Review systems have been reviewed and are negative, noncontributory, orders previously mentioned x12 systems.     A personally reviewed EKG March 9, 2022: Normal sinus     Assessment:  Coronary artery disease/non-ST elevation myocardial infarction/drug-eluting stent RCA June 30, 2020  Hypertension  Dyslipidemia  Diabetes mellitus  Obstructive sleep apnea on CPAP  Obesity  Osteoarthritis  Depression  Disabled/bilateral vision loss/ischemic optic neuropathy-Dr. Torres  Intolerance ACE inhibitor due to cough     Recommendations:  Patient appears to be stable from a cardiac standpoint. Symptoms of angina and no ischemia on stress test. No symptomatic arrhythmia. No evidence of heart failure. I encourage diet, weight loss, exercise program 30 minutes per day. Increase water intake to 64 ounces per day.Blood pressure under good control. I advised the patient decrease the use of  nonsteroidal anti-inflammatory medications explained the adverse affects to his cardiac, gastrointestinal, and renal systems. Advise maintain blood pressure diary prior to future office visits. Follow-up in 1 year.  In 1 year obtain Lexiscan nuclear stress test.  Unable to use treadmill due to bilateral hip pain.  Routine lab work through PCP.     Please excuse any errors in grammar or translation related to this dictation. Voice recognition software was utilized to prepare this document    Recent Cardiovascular Testing:  The following results have been reviewed and updated.    Labs 12/3/24  1.Chol: 126, HDL 39, LDL 58, Trig 145     Echocardiogram 6/30/2020:  1.EF 60-65%        MPL 10/12/22  1. 10.2 METS  2. Normal  3. EF 57%       VITALS  Vitals:    01/15/25 1308   BP: 138/82   Pulse: 84     Wt Readings from Last 4 Encounters:   01/15/25 109 kg (239 lb 9.6 oz)   12/05/24 107 kg (235 lb 12.8 oz)   12/02/24 107 kg (235 lb)   10/29/24 105 kg (231 lb)       PHYSICAL EXAM:  GENERAL:  Well developed, well nourished, in no acute distress.  CHEST:  Symmetric and nontender.  NEURO/PSYCH:  Alert and oriented times three with approppriate behavior and responses.  NECK:  Supple, no JVD, no bruit.  LUNGS:  Clear to auscultation bilaterally, normal respiratory effort.  HEART:  Rate and rhythm REGULAR with no evident murmur, no gallop appreciated.    There are no rubs, clicks or heaves.  EXTREMITIES:  Warm with good color, no clubbing or cyanosis.  There is no edema noted.  PERIPHERAL VASCULAR:  Pulses present and equally palpable; 2+ throughout.    ASSESSMENT AND PLAN  Problem List Items Addressed This Visit          Cardiac and Vasculature    Atherosclerosis of coronary artery    Relevant Orders    Nuclear Stress Test    Essential hypertension    Relevant Orders    Nuclear Stress Test    Intermittent claudication (CMS-HCC)    Mixed hyperlipidemia       Endocrine/Metabolic    Type 2 diabetes mellitus without complication, with  "long-term current use of insulin (Multi)    BMI 35.0-35.9,adult       Sleep    Obstructive sleep apnea       Tobacco    Never smoked tobacco       Past Medical History  Past Medical History:   Diagnosis Date    Coronary artery disease     Diabetes mellitus (Multi)     Hypertension     Other specified health status     Non-smoker       Social History  Social History     Tobacco Use    Smoking status: Former     Current packs/day: 0.00     Types: Cigarettes     Quit date:      Years since quittin.0     Passive exposure: Never    Smokeless tobacco: Never   Vaping Use    Vaping status: Never Used   Substance Use Topics    Alcohol use: Never    Drug use: Never       Family History     Family History   Problem Relation Name Age of Onset    Diabetes Mother Cherelle pratt     Hypertension Mother Cherelle pratt     Diabetes Father James pratt     Hypertension Father James pratt     Other (bladder cancer) Father James pratt     Diabetes Daughter Brooklyn pratt     Diabetes Other Sibling         Allergies:  Allergies   Allergen Reactions    Ace Inhibitors Cough    Lisinopril Cough    Semaglutide Diarrhea        Outpatient Medications:  Current Outpatient Medications   Medication Instructions    aspirin 81 mg, Daily    atorvastatin (LIPITOR) 80 mg, oral, Nightly    BD Marly 2nd Gen Pen Needle 32 gauge x 5/32\" needle USE 1 DAILY AS DIRECTED    blood-glucose transmitter device (Dexcom G6 Transmitter) device USE TO CHECK BLOOD SUGAR 4 TIMES DAILY    dapagliflozin propanediol (FARXIGA) 10 mg, oral, Daily before breakfast    escitalopram (LEXAPRO) 20 mg, oral, Daily    fenofibrate (TRIGLIDE) 160 mg, oral, Daily    fluticasone (Flonase) 50 mcg/actuation nasal spray 2 sprays, Each Nostril, Daily    FreeStyle Franc 3 [place] misc Use as instructed    FreeStyle Franc 3 Sensor device Test glucose three times daily- change sensor every 14 days    icosapent ethyL (VASCEPA) 2 g, oral, 2 times daily    insulin aspart (NOVOLOG) 10 Units, subcutaneous, 3 " "times daily before meals, Plus sliding scale. Hypoglycemia protocol Call LIP unit(s) if Blood Glucose is between 0 - 70 mg/dL 0 unit(s) if Blood glucose is between  2 unit(s) if Blood glucose is between 151-200 4 unit(s) if Blood glucose is between 201-250 6 unit(s) if Blood glucose is between 251-300 8 unit(s) if Blood glucose is between 301-350 10 unit(s) if Blood glucose is between 351-400 Notify provider unit(s) if Blood Glucose is greater than 400 mg/dL. MAX 60 UNITS DAILY    insulin glargine (Basaglar KwikPen U-100 Insulin) 100 unit/mL (3 mL) pen INJECT 80 UNITS SUBCUTANEOUSLY EVERY MORNING    insulin glargine-yfgn (Semglee,insulin glarg-yfgn,Pen) 100 unit/mL (3 mL) Pen INJECT 80 UNITS SUBCUTANEOUSLY EVERY MORNING    losartan (COZAAR) 25 mg, oral, Daily    metFORMIN (GLUCOPHAGE) 500 mg, oral, 2 times daily (morning and late afternoon)    metoprolol succinate XL (TOPROL-XL) 25 mg, oral, Daily, Do not crush or chew.    mupirocin (Bactroban) 2 % ointment     nitroglycerin (NITROSTAT) 0.4 mg    omeprazole (PRILOSEC) 20 mg    semaglutide (RYBELSUS) 14 mg, oral, Daily    sub-q insulin device, 40 unit (V-GO 40) device Use as directed    traZODone (DESYREL) 50 mg, oral, Nightly        Recent Lab Results:    CMP:    Lab Results   Component Value Date     12/03/2024    K 3.8 12/03/2024     12/03/2024    CO2 25 12/03/2024    BUN 16 12/03/2024    CREATININE 1.09 12/03/2024    GLUCOSE 78 12/03/2024    CALCIUM 9.3 12/03/2024       Magnesium:    Lab Results   Component Value Date    MG 2.06 12/23/2023       Lipid Profile:    Lab Results   Component Value Date    TRIG 145 12/03/2024    HDL 39.5 12/03/2024    LDLCALC 58 12/03/2024    LDLDIRECT 77 06/30/2020       TSH:    No results found for: \"TSH\"    BNP:   Lab Results   Component Value Date    BNP 12 06/29/2020        PT/INR:    Lab Results   Component Value Date    PROTIME 11.5 12/23/2023    INR 1.0 12/23/2023       HgBA1c:    Lab Results   Component Value " Date    HGBA1C 7.1 (H) 12/03/2024       BMP:  Lab Results   Component Value Date     12/03/2024     09/06/2024     05/10/2024    K 3.8 12/03/2024    K 4.2 09/06/2024    K 3.5 05/10/2024     12/03/2024    CL 98 09/06/2024     05/10/2024    CO2 25 12/03/2024    CO2 21 09/06/2024    CO2 27 05/10/2024    BUN 16 12/03/2024    BUN 12 09/06/2024    BUN 18 05/10/2024    CREATININE 1.09 12/03/2024    CREATININE 1.04 09/06/2024    CREATININE 0.94 05/10/2024       CBC:  Lab Results   Component Value Date    WBC 5.0 09/06/2024    WBC 4.3 (L) 12/23/2023    WBC 8.4 11/29/2023    RBC 5.02 09/06/2024    RBC 5.41 12/23/2023    RBC 5.48 11/29/2023    HGB 15.0 09/06/2024    HGB 16.2 12/23/2023    HGB 16.3 11/29/2023    HCT 45.0 09/06/2024    HCT 47.5 12/23/2023    HCT 49.2 11/29/2023    MCV 90 09/06/2024    MCV 88 12/23/2023    MCV 90 11/29/2023    MCH 29.9 09/06/2024    MCH 29.9 12/23/2023    MCH 29.7 11/29/2023    MCHC 33.3 09/06/2024    MCHC 34.1 12/23/2023    MCHC 33.1 11/29/2023    RDW 13.8 09/06/2024    RDW 12.2 12/23/2023    RDW 12.5 11/29/2023     09/06/2024     12/23/2023     11/29/2023       Cardiac Enzymes:    Lab Results   Component Value Date    TROPHS 3 12/23/2023       Hepatic Function Panel:    Lab Results   Component Value Date    ALKPHOS 41 12/03/2024    ALT 23 12/03/2024    AST 14 12/03/2024    PROT 6.8 12/03/2024    BILITOT 0.7 12/03/2024           Edgar Seals, DO

## 2025-01-15 NOTE — PATIENT INSTRUCTIONS
BP instructions  Continue same medications and treatments.   Patient educated on proper medication use.   Patient educated on risk factor modification.   Please bring any lab results from other providers / physicians to your next appointment.     Please bring all medicines, vitamins, and herbal supplements with you when you come to the office.     Prescriptions will not be filled unless you are compliant with your follow up appointments or have a follow up appointment scheduled as per instruction of your physician. Refills should be requested at the time of your visit.  MPL Lexiscan stress test one year.  1 year visit

## 2025-03-06 ENCOUNTER — APPOINTMENT (OUTPATIENT)
Dept: PRIMARY CARE | Facility: CLINIC | Age: 60
End: 2025-03-06
Payer: COMMERCIAL

## 2025-03-11 DIAGNOSIS — F33.1 MODERATE EPISODE OF RECURRENT MAJOR DEPRESSIVE DISORDER: ICD-10-CM

## 2025-03-11 DIAGNOSIS — F41.1 GENERALIZED ANXIETY DISORDER: ICD-10-CM

## 2025-03-12 DIAGNOSIS — F33.1 MODERATE EPISODE OF RECURRENT MAJOR DEPRESSIVE DISORDER: ICD-10-CM

## 2025-03-12 DIAGNOSIS — F41.1 GENERALIZED ANXIETY DISORDER: ICD-10-CM

## 2025-03-12 RX ORDER — ESCITALOPRAM OXALATE 20 MG/1
20 TABLET ORAL DAILY
Qty: 30 TABLET | Refills: 0 | OUTPATIENT
Start: 2025-03-12

## 2025-03-12 RX ORDER — ESCITALOPRAM OXALATE 20 MG/1
20 TABLET ORAL DAILY
Qty: 30 TABLET | Refills: 0 | Status: SHIPPED | OUTPATIENT
Start: 2025-03-12

## 2025-03-15 DIAGNOSIS — E11.9 TYPE 2 DIABETES MELLITUS WITHOUT COMPLICATION, WITH LONG-TERM CURRENT USE OF INSULIN (MULTI): ICD-10-CM

## 2025-03-15 DIAGNOSIS — Z79.4 TYPE 2 DIABETES MELLITUS WITHOUT COMPLICATION, WITH LONG-TERM CURRENT USE OF INSULIN (MULTI): ICD-10-CM

## 2025-03-15 RX ORDER — METFORMIN HYDROCHLORIDE 500 MG/1
TABLET ORAL
Qty: 60 TABLET | Refills: 0 | Status: SHIPPED | OUTPATIENT
Start: 2025-03-15

## 2025-03-22 LAB
ALBUMIN SERPL-MCNC: 4.8 G/DL (ref 3.6–5.1)
ALP SERPL-CCNC: 34 U/L (ref 35–144)
ALT SERPL-CCNC: 22 U/L (ref 9–46)
ANION GAP SERPL CALCULATED.4IONS-SCNC: 9 MMOL/L (CALC) (ref 7–17)
AST SERPL-CCNC: 14 U/L (ref 10–35)
BILIRUB SERPL-MCNC: 0.5 MG/DL (ref 0.2–1.2)
BUN SERPL-MCNC: 18 MG/DL (ref 7–25)
CALCIUM SERPL-MCNC: 9.5 MG/DL (ref 8.6–10.3)
CHLORIDE SERPL-SCNC: 103 MMOL/L (ref 98–110)
CHOLEST SERPL-MCNC: 180 MG/DL
CHOLEST/HDLC SERPL: 4.2 (CALC)
CO2 SERPL-SCNC: 28 MMOL/L (ref 20–32)
CREAT SERPL-MCNC: 1.08 MG/DL (ref 0.7–1.3)
EGFRCR SERPLBLD CKD-EPI 2021: 80 ML/MIN/1.73M2
EST. AVERAGE GLUCOSE BLD GHB EST-MCNC: 186 MG/DL
EST. AVERAGE GLUCOSE BLD GHB EST-SCNC: 10.3 MMOL/L
GLUCOSE SERPL-MCNC: 132 MG/DL (ref 65–99)
HBA1C MFR BLD: 8.1 % OF TOTAL HGB
HDLC SERPL-MCNC: 43 MG/DL
LDLC SERPL CALC-MCNC: 101 MG/DL (CALC)
NONHDLC SERPL-MCNC: 137 MG/DL (CALC)
POTASSIUM SERPL-SCNC: 4.3 MMOL/L (ref 3.5–5.3)
PROT SERPL-MCNC: 7 G/DL (ref 6.1–8.1)
SODIUM SERPL-SCNC: 140 MMOL/L (ref 135–146)
TRIGL SERPL-MCNC: 239 MG/DL

## 2025-03-26 DIAGNOSIS — Z79.4 TYPE 2 DIABETES MELLITUS WITHOUT COMPLICATION, WITH LONG-TERM CURRENT USE OF INSULIN: ICD-10-CM

## 2025-03-26 DIAGNOSIS — E11.9 TYPE 2 DIABETES MELLITUS WITHOUT COMPLICATION, WITH LONG-TERM CURRENT USE OF INSULIN: ICD-10-CM

## 2025-03-26 RX ORDER — METFORMIN HYDROCHLORIDE 500 MG/1
TABLET ORAL
Qty: 60 TABLET | Refills: 0 | Status: SHIPPED | OUTPATIENT
Start: 2025-03-26

## 2025-03-26 NOTE — TELEPHONE ENCOUNTER
Last disp. 03/15/25-30 days     Name: Paulo GONZALEZ Abel  :  1965     Date of last appointment:  2024   Date of next appointment:  3/31/2025   Best number to reach patient:  [phone]

## 2025-03-31 ENCOUNTER — APPOINTMENT (OUTPATIENT)
Dept: PRIMARY CARE | Facility: CLINIC | Age: 60
End: 2025-03-31
Payer: COMMERCIAL

## 2025-03-31 VITALS
DIASTOLIC BLOOD PRESSURE: 70 MMHG | OXYGEN SATURATION: 92 % | HEIGHT: 69 IN | BODY MASS INDEX: 35.84 KG/M2 | HEART RATE: 88 BPM | SYSTOLIC BLOOD PRESSURE: 122 MMHG | TEMPERATURE: 97.8 F | RESPIRATION RATE: 18 BRPM | WEIGHT: 242 LBS

## 2025-03-31 DIAGNOSIS — E78.2 MIXED HYPERLIPIDEMIA: ICD-10-CM

## 2025-03-31 DIAGNOSIS — I10 ESSENTIAL HYPERTENSION: ICD-10-CM

## 2025-03-31 DIAGNOSIS — Z12.11 SCREENING FOR COLON CANCER: ICD-10-CM

## 2025-03-31 DIAGNOSIS — Z79.4 TYPE 2 DIABETES MELLITUS WITHOUT COMPLICATION, WITH LONG-TERM CURRENT USE OF INSULIN: ICD-10-CM

## 2025-03-31 DIAGNOSIS — Z00.00 HEALTHCARE MAINTENANCE: Primary | ICD-10-CM

## 2025-03-31 DIAGNOSIS — E11.9 TYPE 2 DIABETES MELLITUS WITHOUT COMPLICATION, WITH LONG-TERM CURRENT USE OF INSULIN: ICD-10-CM

## 2025-03-31 DIAGNOSIS — E66.812 CLASS 2 SEVERE OBESITY DUE TO EXCESS CALORIES WITH SERIOUS COMORBIDITY AND BODY MASS INDEX (BMI) OF 35.0 TO 35.9 IN ADULT: ICD-10-CM

## 2025-03-31 DIAGNOSIS — F41.1 GENERALIZED ANXIETY DISORDER: ICD-10-CM

## 2025-03-31 DIAGNOSIS — F33.1 MODERATE EPISODE OF RECURRENT MAJOR DEPRESSIVE DISORDER: ICD-10-CM

## 2025-03-31 DIAGNOSIS — E66.01 CLASS 2 SEVERE OBESITY DUE TO EXCESS CALORIES WITH SERIOUS COMORBIDITY AND BODY MASS INDEX (BMI) OF 35.0 TO 35.9 IN ADULT: ICD-10-CM

## 2025-03-31 PROCEDURE — 3074F SYST BP LT 130 MM HG: CPT | Performed by: FAMILY MEDICINE

## 2025-03-31 PROCEDURE — 1036F TOBACCO NON-USER: CPT | Performed by: FAMILY MEDICINE

## 2025-03-31 PROCEDURE — 3008F BODY MASS INDEX DOCD: CPT | Performed by: FAMILY MEDICINE

## 2025-03-31 PROCEDURE — 3078F DIAST BP <80 MM HG: CPT | Performed by: FAMILY MEDICINE

## 2025-03-31 PROCEDURE — 99396 PREV VISIT EST AGE 40-64: CPT | Performed by: FAMILY MEDICINE

## 2025-03-31 PROCEDURE — 4010F ACE/ARB THERAPY RXD/TAKEN: CPT | Performed by: FAMILY MEDICINE

## 2025-03-31 RX ORDER — METFORMIN HYDROCHLORIDE 500 MG/1
TABLET ORAL
Qty: 60 TABLET | Refills: 2 | Status: SHIPPED | OUTPATIENT
Start: 2025-03-31

## 2025-03-31 RX ORDER — ESCITALOPRAM OXALATE 20 MG/1
20 TABLET ORAL DAILY
Qty: 30 TABLET | Refills: 2 | Status: SHIPPED | OUTPATIENT
Start: 2025-03-31

## 2025-03-31 RX ORDER — TRAZODONE HYDROCHLORIDE 50 MG/1
50 TABLET ORAL NIGHTLY
Qty: 30 TABLET | Refills: 2 | Status: SHIPPED | OUTPATIENT
Start: 2025-03-31

## 2025-03-31 RX ORDER — DAPAGLIFLOZIN 10 MG/1
10 TABLET, FILM COATED ORAL
Qty: 30 TABLET | Refills: 3 | Status: SHIPPED | OUTPATIENT
Start: 2025-03-31

## 2025-03-31 RX ORDER — ICOSAPENT ETHYL 1 G/1
2 CAPSULE ORAL 2 TIMES DAILY
Qty: 120 CAPSULE | Refills: 5 | Status: SHIPPED | OUTPATIENT
Start: 2025-03-31

## 2025-03-31 RX ORDER — ATORVASTATIN CALCIUM 80 MG/1
80 TABLET, FILM COATED ORAL NIGHTLY
Qty: 30 TABLET | Refills: 2 | Status: SHIPPED | OUTPATIENT
Start: 2025-03-31

## 2025-03-31 RX ORDER — BLOOD-GLUCOSE SENSOR
EACH MISCELLANEOUS
Qty: 2 EACH | Refills: 11 | Status: SHIPPED | OUTPATIENT
Start: 2025-03-31

## 2025-03-31 RX ORDER — METOPROLOL SUCCINATE 25 MG/1
25 TABLET, EXTENDED RELEASE ORAL DAILY
Qty: 30 TABLET | Refills: 5 | Status: SHIPPED | OUTPATIENT
Start: 2025-03-31

## 2025-03-31 RX ORDER — FENOFIBRATE 160 MG/1
160 TABLET ORAL DAILY
Qty: 30 TABLET | Refills: 2 | Status: SHIPPED | OUTPATIENT
Start: 2025-03-31

## 2025-03-31 RX ORDER — INSULIN GLARGINE-YFGN 100 [IU]/ML
INJECTION, SOLUTION SUBCUTANEOUS
Qty: 30 ML | Refills: 3 | Status: SHIPPED | OUTPATIENT
Start: 2025-03-31

## 2025-03-31 ASSESSMENT — ENCOUNTER SYMPTOMS
NUMBNESS: 0
COUGH: 0
WEAKNESS: 0
FATIGUE: 0
POLYPHAGIA: 0
ABDOMINAL PAIN: 0
VOMITING: 0
DEPRESSED MOOD: 1
POLYDIPSIA: 0
IRRITABILITY: 0
DYSURIA: 0
CHILLS: 0
VISUAL CHANGE: 0
EYE DISCHARGE: 0
SORE THROAT: 0
CONSTIPATION: 0
HEADACHES: 0
RESTLESSNESS: 1
PHOTOPHOBIA: 0
FEVER: 0
FREQUENCY: 0
DIARRHEA: 0
HEMATURIA: 0
EYE PAIN: 0
NERVOUS/ANXIOUS: 1
BRUISES/BLEEDS EASILY: 0
INSOMNIA: 1
ADENOPATHY: 0
RHINORRHEA: 0
BLURRED VISION: 0
TREMORS: 0
WHEEZING: 0
COMPULSIONS: 0

## 2025-03-31 ASSESSMENT — ANXIETY QUESTIONNAIRES
IF YOU CHECKED OFF ANY PROBLEMS ON THIS QUESTIONNAIRE, HOW DIFFICULT HAVE THESE PROBLEMS MADE IT FOR YOU TO DO YOUR WORK, TAKE CARE OF THINGS AT HOME, OR GET ALONG WITH OTHER PEOPLE: NOT DIFFICULT AT ALL
1. FEELING NERVOUS, ANXIOUS, OR ON EDGE: SEVERAL DAYS
7. FEELING AFRAID AS IF SOMETHING AWFUL MIGHT HAPPEN: SEVERAL DAYS
4. TROUBLE RELAXING: SEVERAL DAYS
GAD7 TOTAL SCORE: 5
2. NOT BEING ABLE TO STOP OR CONTROL WORRYING: SEVERAL DAYS
5. BEING SO RESTLESS THAT IT IS HARD TO SIT STILL: NOT AT ALL
3. WORRYING TOO MUCH ABOUT DIFFERENT THINGS: SEVERAL DAYS
6. BECOMING EASILY ANNOYED OR IRRITABLE: NOT AT ALL

## 2025-03-31 ASSESSMENT — PATIENT HEALTH QUESTIONNAIRE - PHQ9
10. IF YOU CHECKED OFF ANY PROBLEMS, HOW DIFFICULT HAVE THESE PROBLEMS MADE IT FOR YOU TO DO YOUR WORK, TAKE CARE OF THINGS AT HOME, OR GET ALONG WITH OTHER PEOPLE: NOT DIFFICULT AT ALL
2. FEELING DOWN, DEPRESSED OR HOPELESS: SEVERAL DAYS
1. LITTLE INTEREST OR PLEASURE IN DOING THINGS: SEVERAL DAYS
SUM OF ALL RESPONSES TO PHQ9 QUESTIONS 1 AND 2: 2

## 2025-03-31 NOTE — ASSESSMENT & PLAN NOTE
Diabetes Mellitus type II, under poor control.  1. Rx changes: Increase Semglee to 70 units  2. Education: Reviewed ‘ABCs’ of diabetes management (respective goals in parentheses):  A1C (<7), blood pressure (<130/80), and cholesterol (LDL <100).  3. Compliance at present is estimated to be poor. Efforts to improve compliance (if necessary) will be directed at dietary modifications: and increased exercise.  4. Follow up: 3 months

## 2025-03-31 NOTE — ASSESSMENT & PLAN NOTE
Stable, continue current medications and management. The risks and benefits of my recommendations, as well as other treatment options were discussed with the patient today.    The side effects of the medications were discussed.  Advised not to take the medication with alcohol .  Exercise regularly and this can give you a sense of well being and help decrease feelings of anxiety.;  Get plenty of sleep. Sleep rests your brain as well as your body, and can improve your general sense of wellbeing as well as your mood.;  Avoid alcohol and drug abuse. It may seem that alcohol or drugs relax you. But in the long run they make anxiety worse and cause more problems.;  Avoid caffeine. Caffeine is found in coffee, tea, soft drinks and chocolate. Caffeine may increase your sense of anxiety because it stimulates your nervous system. Also avoid over-the-counter diet pills, and cough and cold medicines that contain a decongestant.;  Confront the things that have made you anxious in the past. Begin by just picturing yourself confronting these things. By doing this, you can get used to the idea of confronting the things that make you anxious before you actually do it. After you feel more comfortable picturing yourself confronting these things, you can begin to actually face them.;  If you feel yourself getting anxious, practice a relaxation technique or focus on a simple task, such as counting backward from 100 to 0.;  Although feelings of anxiety are scary, they won't hurt you. Label the level of your fear from 0 to 10 and keep track as it goes up and down. Notice that it doesn't stay at a very high level for more than a few seconds. When the fear comes, accept it. Wait and give it time to pass without running away from it.;  Take medications as prescribed.

## 2025-03-31 NOTE — PATIENT INSTRUCTIONS
BMI was above normal measurement. Current weight: 110 kg (242 lb)  Weight change since last visit (-) denotes wt loss 2.4 lbs   Weight loss needed to achieve BMI 25: 73.1 Lbs  Weight loss needed to achieve BMI 30: 39.3 Lbs  Advised to Increase physical activity.

## 2025-03-31 NOTE — ASSESSMENT & PLAN NOTE
Stable, continue current medications and management. Reviewed concept of depression as biochemical imbalance of neurotransmitters and rationale for treatment. Instructed patient to contact office or on-call physician promptly should condition worsen or any new symptoms appear

## 2025-03-31 NOTE — PROGRESS NOTES
Subjective   Patient ID: Paulo Roman is a 60 y.o. male who presents for Annual Exam and Follow-up (Diabetes, Hypertension, Hyperlipidemia, Anxiety, Depression and labs).    Patient presents today for annual physical exam and follow-up on hypertension and hyperlipidemia. He has no chest pain, dyspnea, exertional chest pressure/discomfort, near-syncope, orthopnea, palpitations, paroxysmal nocturnal dyspnea, and syncope. Taking his medication regularly with no side effects.    Anxiety  Presents for follow-up visit. Symptoms include depressed mood, excessive worry, insomnia, nervous/anxious behavior and restlessness. Patient reports no chest pain, compulsions, irritability or suicidal ideas. Primary symptoms comment: nightmares. Symptoms occur constantly. The quality of sleep is poor. Nighttime awakenings: early a.m. for rest of night.     Compliance with medications is %.   Diabetes  He presents for his follow-up diabetic visit. He has type 2 diabetes mellitus. His disease course has been worsening. Hypoglycemia symptoms include nervousness/anxiousness. Pertinent negatives for hypoglycemia include no headaches or tremors. Pertinent negatives for diabetes include no blurred vision, no chest pain, no fatigue, no foot paresthesias, no foot ulcerations, no polydipsia, no polyphagia, no polyuria, no visual change and no weakness. Risk factors for coronary artery disease include diabetes mellitus, dyslipidemia, hypertension, male sex and obesity.        Review of Systems   Constitutional:  Negative for chills, fatigue, fever and irritability.   HENT:  Negative for congestion, ear pain, nosebleeds, rhinorrhea and sore throat.    Eyes:  Positive for visual disturbance. Negative for blurred vision, photophobia, pain and discharge.   Respiratory:  Negative for cough and wheezing.    Cardiovascular:  Negative for chest pain and leg swelling.   Gastrointestinal:  Negative for abdominal pain, constipation, diarrhea and  "vomiting.   Endocrine: Negative for cold intolerance, heat intolerance, polydipsia, polyphagia and polyuria.   Genitourinary:  Negative for dysuria, frequency and hematuria.   Neurological:  Negative for tremors, weakness, numbness and headaches.   Hematological:  Negative for adenopathy. Does not bruise/bleed easily.   Psychiatric/Behavioral:  Negative for suicidal ideas. The patient is nervous/anxious and has insomnia.        Objective   /70   Pulse 88   Temp 36.6 °C (97.8 °F)   Resp 18   Ht 1.753 m (5' 9\")   Wt 110 kg (242 lb)   SpO2 92%   BMI 35.74 kg/m²     Physical Exam  Constitutional:       General: He is not in acute distress.     Appearance: Normal appearance.   HENT:      Head: Normocephalic and atraumatic.      Mouth/Throat:      Mouth: Mucous membranes are moist.      Pharynx: Oropharynx is clear. No oropharyngeal exudate or posterior oropharyngeal erythema.   Eyes:      General: No scleral icterus.     Extraocular Movements: Extraocular movements intact.      Pupils: Pupils are equal, round, and reactive to light.   Cardiovascular:      Rate and Rhythm: Normal rate and regular rhythm.      Pulses: Normal pulses.      Heart sounds: No murmur heard.     No friction rub. No gallop.   Pulmonary:      Effort: Pulmonary effort is normal.      Breath sounds: No wheezing, rhonchi or rales.   Musculoskeletal:         General: No deformity.      Cervical back: Normal range of motion and neck supple.      Left lower leg: No edema.   Skin:     General: Skin is warm.      Coloration: Skin is not jaundiced or pale.      Findings: No erythema or rash.   Neurological:      General: No focal deficit present.      Mental Status: He is alert and oriented to person, place, and time.      Cranial Nerves: No cranial nerve deficit.      Sensory: No sensory deficit.      Coordination: Coordination normal.      Gait: Gait normal.         Orders Only on 03/21/2025   Component Date Value Ref Range Status    " CHOLESTEROL, TOTAL 03/21/2025 180  <200 mg/dL Final    HDL CHOLESTEROL 03/21/2025 43  > OR = 40 mg/dL Final    TRIGLYCERIDES 03/21/2025 239 (H)  <150 mg/dL Final    Comment:    If a non-fasting specimen was collected, consider  repeat triglyceride testing on a fasting specimen  if clinically indicated.   Dwight et al. J. of Clin. Lipidol. 2015;9:129-169.         LDL-CHOLESTEROL 03/21/2025 101 (H)  mg/dL (calc) Final    Comment: Reference range: <100     Desirable range <100 mg/dL for primary prevention;    <70 mg/dL for patients with CHD or diabetic patients   with > or = 2 CHD risk factors.     LDL-C is now calculated using the Mateo-Ross   calculation, which is a validated novel method providing   better accuracy than the Friedewald equation in the   estimation of LDL-C.   Mateo MILLIGAN et al. DAMIR. 2013;310(19): 9912-3630   (http://education.[employer].8hands/faq/BKJ739)      CHOL/HDLC RATIO 03/21/2025 4.2  <5.0 (calc) Final    NON HDL CHOLESTEROL 03/21/2025 137 (H)  <130 mg/dL (calc) Final    Comment: For patients with diabetes plus 1 major ASCVD risk   factor, treating to a non-HDL-C goal of <100 mg/dL   (LDL-C of <70 mg/dL) is considered a therapeutic   option.      GLUCOSE 03/21/2025 132 (H)  65 - 99 mg/dL Final    Comment:               Fasting reference interval     For someone without known diabetes, a glucose  value >125 mg/dL indicates that they may have  diabetes and this should be confirmed with a  follow-up test.         UREA NITROGEN (BUN) 03/21/2025 18  7 - 25 mg/dL Final    CREATININE 03/21/2025 1.08  0.70 - 1.30 mg/dL Final    EGFR 03/21/2025 80  > OR = 60 mL/min/1.73m2 Final    SODIUM 03/21/2025 140  135 - 146 mmol/L Final    POTASSIUM 03/21/2025 4.3  3.5 - 5.3 mmol/L Final    CHLORIDE 03/21/2025 103  98 - 110 mmol/L Final    CARBON DIOXIDE 03/21/2025 28  20 - 32 mmol/L Final    ELECTROLYTE BALANCE 03/21/2025 9  7 - 17 mmol/L (calc) Final    CALCIUM 03/21/2025 9.5  8.6 - 10.3 mg/dL Final     PROTEIN, TOTAL 03/21/2025 7.0  6.1 - 8.1 g/dL Final    ALBUMIN 03/21/2025 4.8  3.6 - 5.1 g/dL Final    BILIRUBIN, TOTAL 03/21/2025 0.5  0.2 - 1.2 mg/dL Final    ALKALINE PHOSPHATASE 03/21/2025 34 (L)  35 - 144 U/L Final    AST 03/21/2025 14  10 - 35 U/L Final    ALT 03/21/2025 22  9 - 46 U/L Final    HEMOGLOBIN A1c 03/21/2025 8.1 (H)  <5.7 % of total Hgb Final    Comment: For someone without known diabetes, a hemoglobin A1c  value of 6.5% or greater indicates that they may have   diabetes and this should be confirmed with a follow-up   test.     For someone with known diabetes, a value <7% indicates   that their diabetes is well controlled and a value   greater than or equal to 7% indicates suboptimal   control. A1c targets should be individualized based on   duration of diabetes, age, comorbid conditions, and   other considerations.     Currently, no consensus exists regarding use of  hemoglobin A1c for diagnosis of diabetes for children.          eAG (mg/dL) 03/21/2025 186  mg/dL Final    eAG (mmol/L) 03/21/2025 10.3  mmol/L Final         Assessment/Plan   Problem List Items Addressed This Visit       Class 2 severe obesity due to excess calories with serious comorbidity and body mass index (BMI) of 35.0 to 35.9 in adult     Continue decrease calorie diet and not more than 1500 calorie per day diet and low-fat diet.  Continue with regular exercise program.  We advised exercise at least 5 days a week for at least 45 minutes and also a minimum of 10,000 steps a day.  The detrimental effects of obesity on health were discussed.         Essential hypertension     Well-controlled, continue current medications and management.           Relevant Medications    metoprolol succinate XL (Toprol-XL) 25 mg 24 hr tablet    Other Relevant Orders    CBC and Auto Differential    Comprehensive Metabolic Panel    Follow Up In [facility] - PCP    Hemoglobin A1C    Lipid Panel    Albumin-Creatinine Ratio, Urine Random     Generalized anxiety disorder     Stable, continue current medications and management. The risks and benefits of my recommendations, as well as other treatment options were discussed with the patient today.    The side effects of the medications were discussed.  Advised not to take the medication with alcohol .  Exercise regularly and this can give you a sense of well being and help decrease feelings of anxiety.;  Get plenty of sleep. Sleep rests your brain as well as your body, and can improve your general sense of wellbeing as well as your mood.;  Avoid alcohol and drug abuse. It may seem that alcohol or drugs relax you. But in the long run they make anxiety worse and cause more problems.;  Avoid caffeine. Caffeine is found in coffee, tea, soft drinks and chocolate. Caffeine may increase your sense of anxiety because it stimulates your nervous system. Also avoid over-the-counter diet pills, and cough and cold medicines that contain a decongestant.;  Confront the things that have made you anxious in the past. Begin by just picturing yourself confronting these things. By doing this, you can get used to the idea of confronting the things that make you anxious before you actually do it. After you feel more comfortable picturing yourself confronting these things, you can begin to actually face them.;  If you feel yourself getting anxious, practice a relaxation technique or focus on a simple task, such as counting backward from 100 to 0.;  Although feelings of anxiety are scary, they won't hurt you. Label the level of your fear from 0 to 10 and keep track as it goes up and down. Notice that it doesn't stay at a very high level for more than a few seconds. When the fear comes, accept it. Wait and give it time to pass without running away from it.;  Take medications as prescribed.         Relevant Medications    escitalopram (Lexapro) 20 mg tablet    Other Relevant Orders    Follow Up In [facility] - PCP     Healthcare maintenance - Primary     Recommend low-cholesterol diet, low-fat diet and low-salt diet.  The need for lifelong dietary compliance in order to reduce cardiac risk is recommended.  We will also recommend regular exercise program to improve lipid balance and overall health.  Recommend decreasing fat and cholesterol in diet, increasing aerobic exercise with a goal of 4 or more days per week         Mixed hyperlipidemia     The nature of cardiac risk has been fully discussed with this patient. Discussed cardiovascular risk analysis and appropriate diet with the need for lifelong measures to reduce the risk. A regular exercise program is recommended to help achieve and maintain normal body weight, fitness and improve lipid balance. Patient education provided. They understand and agree with this course of treatment. They will return with new or worsening symptoms. Patient instructed to remain current with appropriate annual health maintenance.          Relevant Medications    atorvastatin (Lipitor) 80 mg tablet    fenofibrate (Triglide) 160 mg tablet    icosapent ethyL (Vascepa) 1 gram capsule    Other Relevant Orders    CBC and Auto Differential    Comprehensive Metabolic Panel    Follow Up In [facility] - PCP    Hemoglobin A1C    Lipid Panel    Albumin-Creatinine Ratio, Urine Random    Moderate episode of recurrent major depressive disorder     Stable, continue current medications and management. Reviewed concept of depression as biochemical imbalance of neurotransmitters and rationale for treatment. Instructed patient to contact office or on-call physician promptly should condition worsen or any new symptoms appear          Relevant Medications    escitalopram (Lexapro) 20 mg tablet    traZODone (Desyrel) 50 mg tablet    Other Relevant Orders    Follow Up In [facility] - PCP    Type 2 diabetes mellitus without complication, with long-term current use of insulin (Multi)     Diabetes  Mellitus type II, under poor control.  1. Rx changes: Increase Semglee to 70 units  2. Education: Reviewed ‘ABCs’ of diabetes management (respective goals in parentheses):  A1C (<7), blood pressure (<130/80), and cholesterol (LDL <100).  3. Compliance at present is estimated to be poor. Efforts to improve compliance (if necessary) will be directed at dietary modifications: and increased exercise.  4. Follow up: 3 months         Relevant Medications    insulin glargine-yfgn (Semglee,insulin glarg-yfgn,Pen) 100 unit/mL (3 mL) pen    dapagliflozin propanediol (Farxiga) 10 mg tablet    metFORMIN (Glucophage) 500 mg tablet    semaglutide (Rybelsus) 14 mg tablet tablet    FreeStyle Franc 3 Sensor device    Other Relevant Orders    CBC and Auto Differential    Comprehensive Metabolic Panel    Follow Up In [facility] - PCP    Hemoglobin A1C    Lipid Panel    Albumin-Creatinine Ratio, Urine Random     Other Visit Diagnoses       Screening for colon cancer        Relevant Orders    Colonoscopy Screening; Average Risk Patient          Scribe Attestation  By signing my name below, IAndrey Scribe   attest that this documentation has been prepared under the direction and in the presence of Ez Maier MD.

## 2025-04-07 RX ORDER — ONDANSETRON HYDROCHLORIDE 2 MG/ML
4 INJECTION, SOLUTION INTRAVENOUS ONCE AS NEEDED
Status: CANCELLED | OUTPATIENT
Start: 2025-04-07

## 2025-04-08 ENCOUNTER — CLINICAL SUPPORT (OUTPATIENT)
Dept: PREADMISSION TESTING | Facility: HOSPITAL | Age: 60
End: 2025-04-08
Payer: COMMERCIAL

## 2025-04-08 VITALS — BODY MASS INDEX: 35.84 KG/M2 | WEIGHT: 242 LBS | HEIGHT: 69 IN

## 2025-04-08 NOTE — PREPROCEDURE INSTRUCTIONS

## 2025-04-09 ENCOUNTER — HOSPITAL ENCOUNTER (OUTPATIENT)
Dept: GASTROENTEROLOGY | Facility: HOSPITAL | Age: 60
Discharge: HOME | End: 2025-04-09
Payer: COMMERCIAL

## 2025-04-09 ENCOUNTER — APPOINTMENT (OUTPATIENT)
Dept: GASTROENTEROLOGY | Facility: EXTERNAL LOCATION | Age: 60
End: 2025-04-09
Payer: COMMERCIAL

## 2025-04-09 ENCOUNTER — ANESTHESIA (OUTPATIENT)
Dept: GASTROENTEROLOGY | Facility: HOSPITAL | Age: 60
End: 2025-04-09
Payer: COMMERCIAL

## 2025-04-09 ENCOUNTER — ANESTHESIA EVENT (OUTPATIENT)
Dept: GASTROENTEROLOGY | Facility: HOSPITAL | Age: 60
End: 2025-04-09
Payer: COMMERCIAL

## 2025-04-09 VITALS
TEMPERATURE: 96.8 F | DIASTOLIC BLOOD PRESSURE: 71 MMHG | WEIGHT: 232.59 LBS | BODY MASS INDEX: 33.3 KG/M2 | HEIGHT: 70 IN | HEART RATE: 81 BPM | SYSTOLIC BLOOD PRESSURE: 151 MMHG | RESPIRATION RATE: 16 BRPM | OXYGEN SATURATION: 98 %

## 2025-04-09 DIAGNOSIS — Z86.0100 HISTORY OF COLON POLYPS: Primary | ICD-10-CM

## 2025-04-09 DIAGNOSIS — Z12.11 SCREENING FOR COLON CANCER: ICD-10-CM

## 2025-04-09 LAB — GLUCOSE BLD MANUAL STRIP-MCNC: 132 MG/DL (ref 74–99)

## 2025-04-09 PROCEDURE — 7100000010 HC PHASE TWO TIME - EACH INCREMENTAL 1 MINUTE

## 2025-04-09 PROCEDURE — 3700000001 HC GENERAL ANESTHESIA TIME - INITIAL BASE CHARGE

## 2025-04-09 PROCEDURE — 45380 COLONOSCOPY AND BIOPSY: CPT | Performed by: INTERNAL MEDICINE

## 2025-04-09 PROCEDURE — 82947 ASSAY GLUCOSE BLOOD QUANT: CPT

## 2025-04-09 PROCEDURE — 2500000004 HC RX 250 GENERAL PHARMACY W/ HCPCS (ALT 636 FOR OP/ED): Performed by: NURSE ANESTHETIST, CERTIFIED REGISTERED

## 2025-04-09 PROCEDURE — 2500000001 HC RX 250 WO HCPCS SELF ADMINISTERED DRUGS (ALT 637 FOR MEDICARE OP): Performed by: INTERNAL MEDICINE

## 2025-04-09 PROCEDURE — 3700000002 HC GENERAL ANESTHESIA TIME - EACH INCREMENTAL 1 MINUTE

## 2025-04-09 PROCEDURE — 7100000009 HC PHASE TWO TIME - INITIAL BASE CHARGE

## 2025-04-09 PROCEDURE — 45385 COLONOSCOPY W/LESION REMOVAL: CPT | Performed by: INTERNAL MEDICINE

## 2025-04-09 RX ORDER — ONDANSETRON HYDROCHLORIDE 2 MG/ML
INJECTION, SOLUTION INTRAVENOUS AS NEEDED
Status: DISCONTINUED | OUTPATIENT
Start: 2025-04-09 | End: 2025-04-09

## 2025-04-09 RX ORDER — PROPOFOL 10 MG/ML
INJECTION, EMULSION INTRAVENOUS AS NEEDED
Status: DISCONTINUED | OUTPATIENT
Start: 2025-04-09 | End: 2025-04-09

## 2025-04-09 RX ORDER — DEXTROMETHORPHAN/PSEUDOEPHED 2.5-7.5/.8
DROPS ORAL AS NEEDED
Status: COMPLETED | OUTPATIENT
Start: 2025-04-09 | End: 2025-04-09

## 2025-04-09 RX ORDER — LIDOCAINE HYDROCHLORIDE 20 MG/ML
INJECTION, SOLUTION INFILTRATION; PERINEURAL AS NEEDED
Status: DISCONTINUED | OUTPATIENT
Start: 2025-04-09 | End: 2025-04-09

## 2025-04-09 RX ADMIN — SIMETHICONE 40 MG: 20 EMULSION ORAL at 14:32

## 2025-04-09 RX ADMIN — PROPOFOL 100 MG: 10 INJECTION, EMULSION INTRAVENOUS at 14:27

## 2025-04-09 RX ADMIN — PROPOFOL 50 MG: 10 INJECTION, EMULSION INTRAVENOUS at 14:46

## 2025-04-09 RX ADMIN — PROPOFOL 50 MG: 10 INJECTION, EMULSION INTRAVENOUS at 14:38

## 2025-04-09 RX ADMIN — PROPOFOL 50 MG: 10 INJECTION, EMULSION INTRAVENOUS at 14:33

## 2025-04-09 RX ADMIN — PROPOFOL 50 MG: 10 INJECTION, EMULSION INTRAVENOUS at 14:42

## 2025-04-09 RX ADMIN — ONDANSETRON 4 MG: 2 INJECTION, SOLUTION INTRAMUSCULAR; INTRAVENOUS at 14:13

## 2025-04-09 RX ADMIN — SODIUM CHLORIDE, POTASSIUM CHLORIDE, SODIUM LACTATE AND CALCIUM CHLORIDE: 600; 310; 30; 20 INJECTION, SOLUTION INTRAVENOUS at 14:23

## 2025-04-09 RX ADMIN — LIDOCAINE HYDROCHLORIDE 5 ML: 20 INJECTION, SOLUTION INFILTRATION; PERINEURAL at 14:27

## 2025-04-09 RX ADMIN — PROPOFOL 50 MG: 10 INJECTION, EMULSION INTRAVENOUS at 14:36

## 2025-04-09 RX ADMIN — PROPOFOL 50 MG: 10 INJECTION, EMULSION INTRAVENOUS at 14:28

## 2025-04-09 SDOH — HEALTH STABILITY: MENTAL HEALTH: CURRENT SMOKER: 0

## 2025-04-09 ASSESSMENT — COLUMBIA-SUICIDE SEVERITY RATING SCALE - C-SSRS
2. HAVE YOU ACTUALLY HAD ANY THOUGHTS OF KILLING YOURSELF?: NO
1. IN THE PAST MONTH, HAVE YOU WISHED YOU WERE DEAD OR WISHED YOU COULD GO TO SLEEP AND NOT WAKE UP?: NO
6. HAVE YOU EVER DONE ANYTHING, STARTED TO DO ANYTHING, OR PREPARED TO DO ANYTHING TO END YOUR LIFE?: NO

## 2025-04-09 ASSESSMENT — PAIN SCALES - GENERAL
PAIN_LEVEL: 0
PAINLEVEL_OUTOF10: 0 - NO PAIN

## 2025-04-09 ASSESSMENT — PAIN - FUNCTIONAL ASSESSMENT: PAIN_FUNCTIONAL_ASSESSMENT: 0-10

## 2025-04-09 NOTE — Clinical Note
Huddle and Timeout completed together with team. Patient wristband and [facility] information verified.  Anesthesia safety check completed. Patient was aake/alert

## 2025-04-09 NOTE — ANESTHESIA PREPROCEDURE EVALUATION
Paulo Roman is a 60 y.o. male here for:    Colonoscopy  With Ermias Hess MD  Screening for colon cancer    Relevant Problems   Cardiac  Echo :  IMPRESSION:  Normal Lexiscan Myoview cardiac perfusion imaging stress test.  No evidence of ischemia or myocardial infarction by perfusion imaging.  Normal left ventricular systolic function, ejection fraction 57 %.  No exercise provoked significant ischemic ECG changes or chest pain  symptoms.  Noninvasive risk stratification-low risk.  Compared to previous adenosine myocardial perfusion stress test 2008 there is interval change. The previous study reported  inferior wall ischemia that is no longer present on the current study.     (+) Atherosclerosis of coronary artery   (+) Essential hypertension   (+) Mixed hyperlipidemia      Pulmonary   (+) JERI (obstructive sleep apnea)   (+) Shortness of breath on exertion      Neuro   (+) Generalized anxiety disorder   (+) Moderate episode of recurrent major depressive disorder      Endocrine   (+) Class 2 severe obesity due to excess calories with serious comorbidity and body mass index (BMI) of 35.0 to 35.9 in adult   (+) Type 2 diabetes mellitus without complication, with long-term current use of insulin      Musculoskeletal   (+) Chronic neck pain   (+) Osteoarthritis   (+) Primary localized osteoarthritis of left hip      HEENT   (+) Acute recurrent maxillary sinusitis      ID   (+) Carbuncle   (+) Carbuncle of right axilla   (+) Viral gastroenteritis       Lab Results   Component Value Date    HGB 15.0 2024    HCT 45.0 2024    WBC 5.0 2024     2024     2025    K 4.3 2025     2025    CREATININE 1.08 2025    BUN 18 2025       Social History     Tobacco Use   Smoking Status Former    Current packs/day: 0.00    Types: Cigarettes    Quit date:     Years since quittin.2    Passive exposure: Never   Smokeless Tobacco Never  "      Allergies   Allergen Reactions    Ace Inhibitors Cough    Lisinopril Cough    Semaglutide Diarrhea       Current Outpatient Medications   Medication Instructions    aspirin 81 mg, Daily    atorvastatin (LIPITOR) 80 mg, oral, Nightly    BD Marly 2nd Gen Pen Needle 32 gauge x 5/32\" needle USE 1 DAILY AS DIRECTED    blood-glucose transmitter device (Dexcom G6 Transmitter) device USE TO CHECK BLOOD SUGAR 4 TIMES DAILY    dapagliflozin propanediol (FARXIGA) 10 mg, oral, Daily before breakfast    escitalopram (LEXAPRO) 20 mg, oral, Daily    fenofibrate (TRIGLIDE) 160 mg, oral, Daily    fluticasone (Flonase) 50 mcg/actuation nasal spray 2 sprays, Each Nostril, Daily    FreeStyle Franc 3 [place] misc Use as instructed    FreeStyle Franc 3 Sensor device Test glucose three times daily- change sensor every 14 days    icosapent ethyL (VASCEPA) 2 g, oral, 2 times daily    insulin aspart (NovoLOG) 100 unit/mL (3 mL) pen inject 10 units under the skin 3 times a day before meals, plus sliding scale. Call LIP if blood glucose between 0-70. Add 0 units if glucose is , add 2 units if 151-200, add 4 units if 201-250, add 6 units if 251-300, add 8 units if 301-350, add 10 units if between 351-400. Notify provider if over 400. Maximum 60 units per day.    insulin glargine-yfgn (Semglee,insulin glarg-yfgn,Pen) 100 unit/mL (3 mL) pen INJECT 70 UNITS SUBCUTANEOUSLY EVERY MORNING    losartan (COZAAR) 25 mg, oral, Daily    metFORMIN (Glucophage) 500 mg tablet TAKE ONE TABLET BY MOUTH TWO TIMES A DAY (in the morning and late afternoon)    metoprolol succinate XL (TOPROL-XL) 25 mg, oral, Daily, Do not crush or chew.    mupirocin (Bactroban) 2 % ointment     nitroglycerin (NITROSTAT) 0.4 mg    omeprazole (PRILOSEC) 20 mg, Daily    semaglutide (RYBELSUS) 14 mg, oral, Daily    sub-q insulin device, 40 unit (V-GO 40) device Use as directed    traZODone (DESYREL) 50 mg, oral, Nightly       Past Surgical History:   Procedure Laterality Date " "   CARDIAC CATHETERIZATION      COLONOSCOPY      CORONARY STENT PLACEMENT      MENISCECTOMY Bilateral     ROTATOR CUFF REPAIR Right     SHOULDER SURGERY Left     \"shoulder impingement\"    UPPER GASTROINTESTINAL ENDOSCOPY         Family History   Problem Relation Name Age of Onset    Diabetes Mother Cherelle pratt     Hypertension Mother Cherelle pratt     Diabetes Father James pratt     Hypertension Father James pratt     Other (bladder cancer) Father James pratt     Diabetes Daughter Brooklyn pratt     Diabetes Other Sibling        NPO Details:  NPO/Void Status  Date of Last Liquid: 04/09/25  Time of Last Liquid: 0700  Date of Last Solid: 04/07/25  Time of Last Solid: 2100  Last Intake Type: GI prep  Time of Last Void: 1000        Physical Exam    Airway  Mallampati: III  TM distance: >3 FB  Neck ROM: full     Cardiovascular - normal exam     Dental - normal exam     Pulmonary - normal exam     Abdominal            Anesthesia Plan    History of general anesthesia?: yes  History of complications of general anesthesia?: no    ASA 3     MAC     The patient is not a current smoker.    intravenous induction   Anesthetic plan and risks discussed with patient.    Plan discussed with CRNA.        "

## 2025-04-09 NOTE — Clinical Note
Patient tolerated procedure well. Appears comfortable with no complaints of pain. VS stable. Arousable prior to transport. Patient transported to [facility] via cart.  Report called              . Handoff completed

## 2025-04-09 NOTE — DISCHARGE INSTRUCTIONS
Patient Instructions after an endoscopy or colonoscopy    Physician Phone List Provided      The anesthetics, sedatives or narcotics which were given to you today will be acting in your body for the next 24 hours, so you might feel a little sleepy or groggy.  This feeling should slowly wear off. Carefully read and follow the instructions.     You received sedation today:  - Do not drive or operate any machinery or power tools of any kind.   - No alcoholic beverages today, not even beer or wine.  - No over the counter medications that contain alcohol or that may cause drowsiness.  - Do not make any important decisions or sign any legal documents.    While it is common to experience mild to moderate abdominal distention, gas, or belching after your procedure, if any of these symptoms occur following discharge from the GI Lab or within one week of having your procedure, call the [facility] to be advised whether a visit to your nearest Urgent Care or Emergency Department is indicated.  Take this paper with you if you go.     - If you develop an allergic reaction to the medications that were given during your procedure such as difficulty breathing, rash, hives, severe nausea, vomiting or lightheadedness.- If you experience chest pain, shortness of breath, severe abdominal pain, fevers and chills.    -If you develop signs and symptoms of bleeding such as blood in your spit, if your stools turn black, tarry, or bloody        High Fiber Diet    About this topic  Dietary fiber helps many illnesses. It can help you if you cannot have a bowel movement or if you have loose stools. Fiber can also lower your risk of diabetes and heart disease. Fiber can help with weight loss by helping you feel wilson after meals.  You can find fiber in fruits, vegetables, nuts and seeds, whole grains, and legumes. The fiber is the part of the plant food that your body cannot break down and absorb. It passes through your stomach,  small bowel, colon, and out your body.  There are two kinds of fiber: Insoluble and soluble fiber. Insoluble fiber helps you pass foods through your digestive system. Insoluble fiber can help you with hard stools. Soluble fiber draws water in and turns it into a gel-like form making digestion slow down. Both are important.    What will the results be?  A high fiber diet can help you with bowel problems like stools that are too hard or too loose. It can also help prevent hemorrhoids and other colon problems. A high fiber diet can also help control your weight and lower blood sugar and cholesterol levels.  What changes to diet are needed?  The amount of fiber you need is based on your age, gender, and health.  Try to get 20 to 35 grams of fiber in your diet each day. Most people in the [place] only eat 15 grams of fiber daily.  Drink at least 8 cups (1920 mL) of fluid each day.  When is this diet used?  Your doctor may talk with you about this diet if you have belly problems.  Who should use this diet?  Older children, young people, and adults can have this diet.  Who should not use this diet?  Some people should not use this diet. Check with your doctor if you have:  Diverticulitis  Active Crohn's disease  Ulcerative colitis  Bowel inflammation  Certain types of GI surgery  Talk to your child's doctor before starting your child on a high fiber diet.  What foods are good to eat?  To get the most from fiber in your diet, eat a wide variety of high fiber foods. Some examples are:  Vegetables like:  Spinach  Peas  Artichoke  Sweet potatoes with skin  Broccoli  Fruits like:  Raspberries  Blueberries  Blackberries  Apples with skin  Dried fruits  Grains like:  Oat bran  Barley  Whole wheat products  Wheat bran  Dried beans and nuts like:  Sunflower seeds  Almonds  Black beans  Chickpeas  What problems could happen?  Sudden increase of fiber intake can lead to gas, pain, fullness in your belly, and loose stools. Increase fiber  gradually while drinking plenty of fluids.  Do not eat too much fiber. Your body will not take in vitamins and minerals as well if you eat too much fiber.  When do I need to call the doctor?  Health problem is not better or you are feeling worse.  Helpful tips  Start slow as you add more fiber to your diet. This may help prevent gas or cramps.  Try to eat the same amount of fiber each day. Aim to get your fiber from nutritious foods. Supplements do not offer the same benefits as food.  Read food labels with care to learn how much fiber is in the food you are eating.  If possible, do not peel fruits or vegetables before you eat them. Eating the peel gives you more fiber.  Where can I learn more?  Eat Right  https://www.eatright.org/food/vitamins-and-supplements/types-of-vitamins-and-nutrients/easy-ways-to-boost-fiber-in-your-daily-diet  Last Reviewed Date  2021-09-23

## 2025-04-09 NOTE — H&P
"Outpatient Hospital Procedure H&P    Patient Profile-Procedures  Initial Info  Patient Demographics  Name Paulo Roman  Date of Birth 1965  MRN [id number]  Address   [address] [place] [id number] [place] [address]    Primary Phone Number [phone]  Secondary Phone Number    Ez Oliva    Procedure(s):  Colonoscopy  Primary contact name and number   Extended Emergency Contact Information  Primary Emergency Contact: Paulo Roman   [facility]  Home Phone: [phone]  Relation: Parent    General Health  Weight   Vitals:    04/09/25 1301   Weight: 105 kg (232 lb 9.4 oz)     BMI Body mass index is 33.37 kg/m².    Allergies  Allergies   Allergen Reactions    Ace Inhibitors Cough    Lisinopril Cough    Semaglutide Diarrhea       Past Medical History   Past Medical History:   Diagnosis Date    Anxiety     Arthritis     Coronary artery disease     Depression     Diabetes mellitus (Multi)     GERD (gastroesophageal reflux disease)     Heart disease     Hyperlipidemia     Hypertension     Joint pain     Myocardial infarction (Multi)     JERI on CPAP     does not know settings    Other specified health status     Non-smoker    Sleep apnea     CPAP- does not know settings    Snores     Type 2 diabetes mellitus     Vision loss     \"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 Authorizing Provider   aspirin 81 mg chewable tablet Chew 1 tablet (81 mg) once daily.   Yes Historical Provider, MD   atorvastatin (Lipitor) 80 mg tablet Take 1 tablet (80 mg) by mouth once daily at bedtime. 3/31/25  Yes Ez Maier MD   dapagliflozin propanediol (Farxiga) 10 mg tablet Take 1 tablet (10 mg) by mouth once daily in the morning. Take before meals. 3/31/25  Yes Ez Maier MD   escitalopram (Lexapro) 20 mg tablet Take 1 tablet " "(20 mg) by mouth once daily. 3/31/25  Yes Ez Maier MD   fenofibrate (Triglide) 160 mg tablet Take 1 tablet (160 mg) by mouth once daily. 3/31/25  Yes Ez Maier MD   fluticasone (Flonase) 50 mcg/actuation nasal spray Administer 2 sprays into each nostril once daily. 4/27/23  Yes Ez Maier MD   FreeStyle Franc 3 [place] misc Use as instructed 12/5/24  Yes Ez Maier MD   FreeStyle Franc 3 Sensor device Test glucose three times daily- change sensor every 14 days 3/31/25  Yes Ez Maier MD   icosapent ethyL (Vascepa) 1 gram capsule Take 2 capsules (2 g) by mouth 2 times a day. 3/31/25  Yes Ez Maier MD   insulin aspart (NovoLOG) 100 unit/mL (3 mL) pen inject 10 units under the skin 3 times a day before meals, plus sliding scale. Call LIP if blood glucose between 0-70. Add 0 units if glucose is , add 2 units if 151-200, add 4 units if 201-250, add 6 units if 251-300, add 8 units if 301-350, add 10 units if between 351-400. Notify provider if over 400. Maximum 60 units per day. 2/24/25  Yes Ez Maier MD   insulin glargine-yfgn (Semglee,insulin glarg-yfgn,Pen) 100 unit/mL (3 mL) pen INJECT 70 UNITS SUBCUTANEOUSLY EVERY MORNING 3/31/25  Yes Ez Maier MD   losartan (Cozaar) 25 mg tablet Take 1 tablet (25 mg) by mouth once daily. 12/5/24  Yes Ez Maier MD   metoprolol succinate XL (Toprol-XL) 25 mg 24 hr tablet Take 1 tablet (25 mg) by mouth once daily. Do not crush or chew. 3/31/25  Yes Ez Maier MD   omeprazole (PriLOSEC) 20 mg DR capsule Take 1 capsule (20 mg) by mouth once daily.   Yes Ronny De Leon MD   semaglutide (Rybelsus) 14 mg tablet tablet Take 1 tablet (14 mg) by mouth once daily. 3/31/25  Yes Ez Maier MD   traZODone (Desyrel) 50 mg tablet Take 1 tablet (50 mg) by mouth once daily at bedtime. 3/31/25  Yes Ez Maier MD   BD Marly 2nd Gen Pen Needle 32 gauge x 5/32\" needle USE 1 DAILY AS DIRECTED 8/18/23   Ez Maier, " MD   blood-glucose transmitter device (Dexcom G6 Transmitter) device USE TO CHECK BLOOD SUGAR 4 TIMES DAILY  Patient not taking: Reported on 1/15/2025 9/3/24   Ez Maier MD   metFORMIN (Glucophage) 500 mg tablet TAKE ONE TABLET BY MOUTH TWO TIMES A DAY (in the morning and late afternoon) 3/31/25   Ez Maier MD   mupirocin (Bactroban) 2 % ointment  4/26/24   Historical Provider, MD   nitroglycerin (Nitrostat) 0.4 mg SL tablet Place 1 tablet (0.4 mg) under the tongue. 6/30/20   Historical Provider, MD   sub-q insulin device, 40 unit (V-GO 40) device Use as directed  Patient not taking: Reported on 1/15/2025 9/3/24   Ez Maier MD       Physical Exam  Vitals:    04/09/25 1301   BP: 164/82   Pulse: 83   Resp: 18   Temp: 35.9 °C (96.6 °F)   SpO2: 97%        General: A&Ox3, NAD.  CV: RRR. No murmur.  Resp: CTA bilaterally. No wheezing, rhonchi or rales.   Extrem: No edema.       Oropharyngeal Classification II (hard and soft palate, upper portion of tonsils and uvula visible)  ASA PS Classification 3  Sedation Plan Deep  Procedure Plan - pre-procedural (re)assesment completed by physician:  discharge/transfer patient when discharge criteria met    Ermias Hess MD  4/9/2025 2:09 PM

## 2025-04-09 NOTE — ANESTHESIA POSTPROCEDURE EVALUATION
Patient: Paulo Roman    Procedure Summary       Date: 04/09/25 Room / Location: [facility]    Anesthesia Start: 1423 Anesthesia Stop:     Procedure: COLONOSCOPY Diagnosis: Screening for colon cancer    Scheduled Providers: Ermias Hess MD; Michael Art MD; Aleida Vidal RN; Urvashi Sandoval Responsible Provider: Russ Galvan DO    Anesthesia Type: MAC ASA Status: 3            Anesthesia Type: MAC    Vitals Value Taken Time   /59 04/09/25 1449   Temp  04/09/25 1449   Pulse 78 04/09/25 1449   Resp 15 04/09/25 1449   SpO2 98 04/09/25 1449       Anesthesia Post Evaluation    Patient location during evaluation: bedside  Patient participation: complete - patient participated  Level of consciousness: awake and alert  Pain score: 0  Pain management: adequate  Airway patency: patent  Cardiovascular status: acceptable and stable  Respiratory status: acceptable and room air  Hydration status: acceptable  Postoperative Nausea and Vomiting: none        No notable events documented.

## 2025-04-17 LAB
LABORATORY COMMENT REPORT: NORMAL
PATH REPORT.FINAL DX SPEC: NORMAL
PATH REPORT.GROSS SPEC: NORMAL
PATH REPORT.RELEVANT HX SPEC: NORMAL
PATH REPORT.TOTAL CANCER: NORMAL

## 2025-05-10 DIAGNOSIS — I10 ESSENTIAL HYPERTENSION: ICD-10-CM

## 2025-05-10 RX ORDER — LOSARTAN POTASSIUM 25 MG/1
25 TABLET ORAL DAILY
Qty: 30 TABLET | Refills: 1 | Status: SHIPPED | OUTPATIENT
Start: 2025-05-10

## 2025-05-13 ENCOUNTER — OFFICE VISIT (OUTPATIENT)
Dept: ORTHOPEDIC SURGERY | Facility: CLINIC | Age: 60
End: 2025-05-13
Payer: COMMERCIAL

## 2025-05-13 ENCOUNTER — HOSPITAL ENCOUNTER (OUTPATIENT)
Dept: RADIOLOGY | Facility: CLINIC | Age: 60
Discharge: HOME | End: 2025-05-13
Payer: COMMERCIAL

## 2025-05-13 DIAGNOSIS — M24.542 CONTRACTURE OF FINGER JOINT, LEFT: ICD-10-CM

## 2025-05-13 DIAGNOSIS — M79.641 PAIN OF RIGHT HAND: ICD-10-CM

## 2025-05-13 DIAGNOSIS — M65.30 ACQUIRED TRIGGER FINGER: ICD-10-CM

## 2025-05-13 DIAGNOSIS — M72.0 DUPUYTREN'S CONTRACTURE: Primary | ICD-10-CM

## 2025-05-13 PROCEDURE — 99212 OFFICE O/P EST SF 10 MIN: CPT | Mod: 25 | Performed by: ORTHOPAEDIC SURGERY

## 2025-05-13 PROCEDURE — 73130 X-RAY EXAM OF HAND: CPT | Mod: RT

## 2025-05-13 PROCEDURE — 20550 NJX 1 TENDON SHEATH/LIGAMENT: CPT | Mod: LT | Performed by: ORTHOPAEDIC SURGERY

## 2025-05-13 PROCEDURE — 73130 X-RAY EXAM OF HAND: CPT | Mod: RIGHT SIDE | Performed by: ORTHOPAEDIC SURGERY

## 2025-05-13 PROCEDURE — 73130 X-RAY EXAM OF HAND: CPT | Mod: LT

## 2025-05-13 PROCEDURE — 99214 OFFICE O/P EST MOD 30 MIN: CPT | Performed by: ORTHOPAEDIC SURGERY

## 2025-05-13 PROCEDURE — 4010F ACE/ARB THERAPY RXD/TAKEN: CPT | Performed by: ORTHOPAEDIC SURGERY

## 2025-05-13 PROCEDURE — 20550 NJX 1 TENDON SHEATH/LIGAMENT: CPT | Mod: RT | Performed by: ORTHOPAEDIC SURGERY

## 2025-05-13 PROCEDURE — 1036F TOBACCO NON-USER: CPT | Performed by: ORTHOPAEDIC SURGERY

## 2025-05-13 PROCEDURE — 73130 X-RAY EXAM OF HAND: CPT | Mod: LEFT SIDE | Performed by: ORTHOPAEDIC SURGERY

## 2025-05-13 PROCEDURE — 2500000004 HC RX 250 GENERAL PHARMACY W/ HCPCS (ALT 636 FOR OP/ED): Performed by: ORTHOPAEDIC SURGERY

## 2025-05-13 PROCEDURE — 20526 THER INJECTION CARP TUNNEL: CPT | Performed by: ORTHOPAEDIC SURGERY

## 2025-05-13 RX ORDER — LIDOCAINE HYDROCHLORIDE 10 MG/ML
0.5 INJECTION, SOLUTION INFILTRATION; PERINEURAL
Status: COMPLETED | OUTPATIENT
Start: 2025-05-13 | End: 2025-05-13

## 2025-05-13 RX ADMIN — TRIAMCINOLONE ACETONIDE 5 MG: 10 INJECTION, SUSPENSION INTRA-ARTICULAR; INTRALESIONAL at 10:35

## 2025-05-13 RX ADMIN — LIDOCAINE HYDROCHLORIDE 0.5 ML: 10 INJECTION, SOLUTION INFILTRATION; PERINEURAL at 10:35

## 2025-05-13 NOTE — PROGRESS NOTES
History present illness: Patient presents today for evaluation of the bilateral hands.  He talks about nodule formation in the palms dropping things numbness to the left ring finger and a sensation of postural change and dysfunction.  He denies associated neck pain.  Type 2 diabetes.      Past medical history: The patient's past medical history, family history, social history, and review of systems were documented on the patient medical intake.  The updated data was reviewed in the electronic medical record.  History is negative except otherwise stated in history of present illness.        Physical examination:  General: Alert and oriented to person, place, and time.  No acute distress and breathing comfortably: Pleasant and cooperative with examination.  HEENT: Head is normocephalic and atraumatic.  Neck: Supple, no visible swelling.  Cardiovascular: No palpable tachycardia  Lungs: No audible wheezing or labored breathing  Abdomen: Nondistended.  Extremities: Evaluation of the bilateral upper extremities finds the patient had palpable radial artery at the wrists with brisk capillary refill to all digits.  Patient has intact sensation to axillary radial median and ulnar nerves.  There are no open wounds.  There are no signs of infection.  There is no evidence of lymphedema or lymphatic streaking.  The patient has supple compartments to bilateral arm forearm and hand.  Early Dupuytren's pitting and nodule formation in line with bilateral ring finger with little in the way of postural change.  Mild tenderness over A1 pulleys with thickening of the retinacular sheath to bilateral ring fingers.  Negative Tinel's over course of median nerve to bilateral wrist and ulnar nerve to bilateral elbows.      Radiology:      Assessment: Early Dupuytren's disease affecting bilateral hands without significant postural change.  Questionable component of bilateral ring finger trigger finger.      Plan: Treatment options were  discussed.  Recommendations were made for trigger finger injection to see if this helps with function and discomfort.  Follow-up with me in the office in 4 weeks.  No x-rays upon return.  Simply observe the numbness to the ring finger for now.  Patient is agreeable with this strategy.        Procedure:  Hand / UE Inj/Asp: R ring A1 for trigger finger on 5/13/2025 10:35 AM  Indications: pain and tendon swelling  Details: 25 G needle, volar approach  Medications: 5 mg triamcinolone acetonide 10 mg/mL; 0.5 mL lidocaine 10 mg/mL (1 %)  Outcome: tolerated well, no immediate complications  Procedure, treatment alternatives, risks and benefits explained, specific risks discussed. Consent was given by the patient. Immediately prior to procedure a time out was called to verify the correct patient, procedure, equipment, support staff and site/side marked as required. Patient was prepped and draped in the usual sterile fashion.       Hand / UE Inj/Asp: L ring A1 for trigger finger on 5/13/2025 10:35 AM  Indications: pain and tendon swelling  Details: 25 G needle, volar approach  Medications: 5 mg triamcinolone acetonide 10 mg/mL; 0.5 mL lidocaine 10 mg/mL (1 %)  Outcome: tolerated well, no immediate complications  Procedure, treatment alternatives, risks and benefits explained, specific risks discussed. Consent was given by the patient. Immediately prior to procedure a time out was called to verify the correct patient, procedure, equipment, support staff and site/side marked as required. Patient was prepped and draped in the usual sterile fashion.

## 2025-06-10 ENCOUNTER — OFFICE VISIT (OUTPATIENT)
Dept: ORTHOPEDIC SURGERY | Facility: CLINIC | Age: 60
End: 2025-06-10
Payer: COMMERCIAL

## 2025-06-10 DIAGNOSIS — M72.0 DUPUYTREN'S CONTRACTURE: ICD-10-CM

## 2025-06-10 DIAGNOSIS — M24.542 CONTRACTURE OF FINGER JOINT, LEFT: Primary | ICD-10-CM

## 2025-06-10 DIAGNOSIS — M65.30 ACQUIRED TRIGGER FINGER: ICD-10-CM

## 2025-06-10 PROCEDURE — 4010F ACE/ARB THERAPY RXD/TAKEN: CPT | Performed by: ORTHOPAEDIC SURGERY

## 2025-06-10 PROCEDURE — 99214 OFFICE O/P EST MOD 30 MIN: CPT | Performed by: ORTHOPAEDIC SURGERY

## 2025-06-10 NOTE — PROGRESS NOTES
6/10/2025    No chief complaint on file.      History of Present Illness:  Patient Paulo Roman , 60 y.o. male, presents today, 6/10/2025, for evaluation of bilateral hand and ring finger pain.  Patient is 4 weeks out from bilateral ring finger trigger digit injections for suspected component of trigger finger with baseline Dupuytren's nodule formation.  He states that on the right side symptoms have completely resolved and that is doing great.  He is pain-free with no mechanical triggering.  To the left ring finger however, he reports no improvement or change after injection.  He reports persistence of pain through the palm that radiates down the length of the digit.  Occasional sensation of tightness to the joints especially at the PIP and an occasional mechanical block.  He is a right-hand-dominant individual.  He takes a baby aspirin daily.  He has history of hypertension, hyperlipidemia, type 2 diabetes.  He is inquiring about surgical options.       Review of Systems:   GENERAL: Negative  GI: Negative  MUSCULOSKELETAL: See HPI  SKIN: Negative  NEURO:  Negative     Physical Exam:  GENERAL:  Alert and oriented to person, place, and time.  No acute distress and breathing comfortably; pleasant and cooperative with the examination.  HEENT:  Head is normocephalic and atraumatic.  NECK:  Supple, no visible swelling.  CARDIOVASCULAR:  No palpable tachycardia.  LUNGS:  No audible wheezing or labored breathing.  ABDOMEN:  Nondistended.  Extremities: Evaluation of bilateral upper extremities finds the patient to have a palpable radial artery at the wrist with brisk capillary refill to all digits. The patient has intact sensorium to axillary, radial, median and ulnar nerves. There are no open wounds. There are no signs of infection. There is no evidence of lymphedema or lymphatic streaking. The patient has supple compartments of the bilateral arms, forearms and hands.  On the right there is no tenderness palpation  overlying the A1 pulley of the ring finger no mechanical triggering noted.  On the left he has exquisite tenderness overlying A1 pulley with crepitus with flexion and extension of the ring finger.  There is evidence of palpable Dupuytren's nodule formation just proximal to the A1 pulley in line with the ring finger ray.  Slight tenderness through the zone as well.     Imaging/Test Results:  None today.     Assessment:  #1: Right trigger finger, resolved with Kenalog injection with baseline of Dupuytren's.  #2: Left ring finger trigger digit with baseline Dupuytren's nodule formation, recalcitrant to nonoperative treatment strategies.     Plan:  Treatment options were reviewed including operative and nonoperative strategies.  On the right recommendations are made for continued activities as tolerated and follow-up as needed.  We elect for simple observation.  On the left however, operative and nonoperative strategies were reviewed.  Patient elects to move forward with surgery by way of left ring finger trigger digit release with concomitant Dupuytren's palmar mass resection under wide-awake approach to anesthesia.  Will have him stop his baby aspirin 5 days prior to surgery and resume on postoperative day #1.  Follow-up with our office 10 to 14 days postop.  Risk, benefits, alternatives to surgery were discussed.  Complications discussed including persistence of pain and discomfort, stiffness, etc.  Patient verbalized agreement, understanding, plan for care.  All questions answered today's visit.    In a face to face encounter, I performed a history and physical examination, discussed pertinent diagnostic studies if indicated, and discussed diagnosis and management strategies with both the patient and the mid-level provider. I reviewed the mid-level's note and agree with the documented findings and plan of care.  Patient presents today for evaluation of the bilateral upper extremities.  Status post bilateral ring  finger trigger finger injection.  Right side is great.  Left side shows tenderness over a Dupuytren's nodule that exist between the proximal and distal palmar flexion crease in line with the left ring finger.  There is also tenderness and evidence for mild triggering over the pulley at A1.  Treatment options were discussed.  Patient elects to proceed forth with surgery by way of trigger finger release to left ring finger with excision of Dupuytren's nodule and short cord formation in the palm.  Plan for wide-awake approach to anesthesia.

## 2025-06-16 DIAGNOSIS — G89.18 POST-OP PAIN: Primary | ICD-10-CM

## 2025-06-16 RX ORDER — HYDROCODONE BITARTRATE AND ACETAMINOPHEN 5; 325 MG/1; MG/1
1 TABLET ORAL EVERY 8 HOURS PRN
Qty: 10 TABLET | Refills: 0 | Status: SHIPPED | OUTPATIENT
Start: 2025-06-16 | End: 2025-06-20

## 2025-06-18 DIAGNOSIS — Z79.4 TYPE 2 DIABETES MELLITUS WITHOUT COMPLICATION, WITH LONG-TERM CURRENT USE OF INSULIN: ICD-10-CM

## 2025-06-18 DIAGNOSIS — E11.9 TYPE 2 DIABETES MELLITUS WITHOUT COMPLICATION, WITH LONG-TERM CURRENT USE OF INSULIN: ICD-10-CM

## 2025-06-18 RX ORDER — INSULIN ASPART 100 [IU]/ML
INJECTION, SOLUTION INTRAVENOUS; SUBCUTANEOUS
Qty: 18 ML | Refills: 3 | Status: SHIPPED | OUTPATIENT
Start: 2025-06-18

## 2025-06-18 NOTE — TELEPHONE ENCOUNTER
Last disp. 05/10/25-30 days     Name: Paulo GONZALEZ Abel  :  1965     Date of last appointment:  3/31/2025   Date of next appointment:  2025   Best number to reach patient:  [phone]

## 2025-06-20 PROCEDURE — 26121 RELEASE PALM CONTRACTURE: CPT | Performed by: ORTHOPAEDIC SURGERY

## 2025-06-30 ENCOUNTER — APPOINTMENT (OUTPATIENT)
Dept: PRIMARY CARE | Facility: CLINIC | Age: 60
End: 2025-06-30
Payer: COMMERCIAL

## 2025-06-30 DIAGNOSIS — Z79.4 TYPE 2 DIABETES MELLITUS WITHOUT COMPLICATION, WITH LONG-TERM CURRENT USE OF INSULIN: ICD-10-CM

## 2025-06-30 DIAGNOSIS — E78.2 MIXED HYPERLIPIDEMIA: ICD-10-CM

## 2025-06-30 DIAGNOSIS — E11.9 TYPE 2 DIABETES MELLITUS WITHOUT COMPLICATION, WITH LONG-TERM CURRENT USE OF INSULIN: ICD-10-CM

## 2025-06-30 DIAGNOSIS — I10 ESSENTIAL HYPERTENSION: ICD-10-CM

## 2025-07-01 ENCOUNTER — OFFICE VISIT (OUTPATIENT)
Dept: ORTHOPEDIC SURGERY | Facility: CLINIC | Age: 60
End: 2025-07-01
Payer: COMMERCIAL

## 2025-07-01 DIAGNOSIS — M72.0 DUPUYTREN'S CONTRACTURE: Primary | ICD-10-CM

## 2025-07-01 DIAGNOSIS — M65.30 ACQUIRED TRIGGER FINGER: ICD-10-CM

## 2025-07-01 PROCEDURE — 4010F ACE/ARB THERAPY RXD/TAKEN: CPT | Performed by: ORTHOPAEDIC SURGERY

## 2025-07-01 PROCEDURE — 99024 POSTOP FOLLOW-UP VISIT: CPT | Performed by: ORTHOPAEDIC SURGERY

## 2025-07-01 PROCEDURE — 1036F TOBACCO NON-USER: CPT | Performed by: ORTHOPAEDIC SURGERY

## 2025-07-01 PROCEDURE — 99212 OFFICE O/P EST SF 10 MIN: CPT | Performed by: ORTHOPAEDIC SURGERY

## 2025-07-01 NOTE — PROGRESS NOTES
7/1/2025    Chief Complaint   Patient presents with    Left Hand - Post-op     Ring finger TFR with mass excision  DOS: 6/20/25       History of Present Illness:  Patient Paulo Roman , 60 y.o. male, presents today, 7/1/2025, for evaluation of left ring finger trigger digit release with mass excision, approximately 2 weeks postop.  Mechanical symptoms are resolved the interim since surgery.  Overall he is doing well.  Minimal soreness discomfort improving at time.  No fevers, chills, constitutional symptoms reported.         Review of Systems:   GENERAL: Negative  GI: Negative  MUSCULOSKELETAL: See HPI  SKIN: Negative  NEURO:  Negative     Physical Exam:  GENERAL:  Alert and oriented to person, place, and time.  No acute distress and breathing comfortably; pleasant and cooperative with the examination.  HEENT:  Head is normocephalic and atraumatic.  NECK:  Supple, no visible swelling.  CARDIOVASCULAR:  No palpable tachycardia.  LUNGS:  No audible wheezing or labored breathing.  ABDOMEN:  Nondistended.  Extremities: The surgical incision is clean, dry, intact, and appears to be healing well.  No active bleeding, erythema, warmth, drainage, or signs of infection.  Appropriate functional ROM demonstrated with flexion/extension of the digits, and flexion/extension/pronosupination of the wrist.     Imaging/Test Results:  Surgical pathology results show findings consistent with palmar fibromatosis/Dupuytren's contracture.     Assessment:  Left ring finger trigger digit release with mass excision, 2 weeks postop.     Plan:  Sutures were removed in the office today.  The patient can begin to weight bear as tolerated.  We discussed and reviewed home exercise program for range of motion recovery, scar massage, and desensitization techniques.  They can return to activities as tolerated.  The patient will follow-up with our office on an as needed basis.  All patient questions answered at today's visit.        Yadi  JACQUI Louise

## 2025-07-17 ENCOUNTER — APPOINTMENT (OUTPATIENT)
Dept: PRIMARY CARE | Facility: CLINIC | Age: 60
End: 2025-07-17
Payer: COMMERCIAL

## 2025-07-17 VITALS
RESPIRATION RATE: 16 BRPM | HEART RATE: 88 BPM | OXYGEN SATURATION: 95 % | BODY MASS INDEX: 34.93 KG/M2 | DIASTOLIC BLOOD PRESSURE: 68 MMHG | SYSTOLIC BLOOD PRESSURE: 114 MMHG | HEIGHT: 70 IN | WEIGHT: 244 LBS | TEMPERATURE: 97.4 F

## 2025-07-17 DIAGNOSIS — F33.1 MODERATE EPISODE OF RECURRENT MAJOR DEPRESSIVE DISORDER: ICD-10-CM

## 2025-07-17 DIAGNOSIS — E66.812 CLASS 2 SEVERE OBESITY DUE TO EXCESS CALORIES WITH SERIOUS COMORBIDITY AND BODY MASS INDEX (BMI) OF 35.0 TO 35.9 IN ADULT: ICD-10-CM

## 2025-07-17 DIAGNOSIS — E66.01 CLASS 2 SEVERE OBESITY DUE TO EXCESS CALORIES WITH SERIOUS COMORBIDITY AND BODY MASS INDEX (BMI) OF 35.0 TO 35.9 IN ADULT: ICD-10-CM

## 2025-07-17 DIAGNOSIS — F41.1 GENERALIZED ANXIETY DISORDER: ICD-10-CM

## 2025-07-17 DIAGNOSIS — E78.2 MIXED HYPERLIPIDEMIA: ICD-10-CM

## 2025-07-17 DIAGNOSIS — Z79.4 TYPE 2 DIABETES MELLITUS WITHOUT COMPLICATION, WITH LONG-TERM CURRENT USE OF INSULIN: Primary | ICD-10-CM

## 2025-07-17 DIAGNOSIS — I10 ESSENTIAL HYPERTENSION: ICD-10-CM

## 2025-07-17 DIAGNOSIS — Z12.5 ENCOUNTER FOR SCREENING FOR MALIGNANT NEOPLASM OF PROSTATE: ICD-10-CM

## 2025-07-17 DIAGNOSIS — E11.9 TYPE 2 DIABETES MELLITUS WITHOUT COMPLICATION, WITH LONG-TERM CURRENT USE OF INSULIN: Primary | ICD-10-CM

## 2025-07-17 LAB
ALBUMIN SERPL-MCNC: 4.8 G/DL (ref 3.6–5.1)
ALP SERPL-CCNC: 40 U/L (ref 35–144)
ALT SERPL-CCNC: 21 U/L (ref 9–46)
ANION GAP SERPL CALCULATED.4IONS-SCNC: 9 MMOL/L (CALC) (ref 7–17)
AST SERPL-CCNC: 12 U/L (ref 10–35)
BASOPHILS # BLD AUTO: 42 CELLS/UL (ref 0–200)
BASOPHILS NFR BLD AUTO: 0.8 %
BILIRUB SERPL-MCNC: 0.5 MG/DL (ref 0.2–1.2)
BUN SERPL-MCNC: 18 MG/DL (ref 7–25)
CALCIUM SERPL-MCNC: 9.2 MG/DL (ref 8.6–10.3)
CHLORIDE SERPL-SCNC: 105 MMOL/L (ref 98–110)
CHOLEST SERPL-MCNC: 223 MG/DL
CHOLEST/HDLC SERPL: 5.2 (CALC)
CO2 SERPL-SCNC: 27 MMOL/L (ref 20–32)
CREAT SERPL-MCNC: 1.07 MG/DL (ref 0.7–1.3)
EGFRCR SERPLBLD CKD-EPI 2021: 80 ML/MIN/1.73M2
EOSINOPHIL # BLD AUTO: 172 CELLS/UL (ref 15–500)
EOSINOPHIL NFR BLD AUTO: 3.3 %
ERYTHROCYTE [DISTWIDTH] IN BLOOD BY AUTOMATED COUNT: 13 % (ref 11–15)
EST. AVERAGE GLUCOSE BLD GHB EST-MCNC: 203 MG/DL
EST. AVERAGE GLUCOSE BLD GHB EST-SCNC: 11.2 MMOL/L
GLUCOSE SERPL-MCNC: 105 MG/DL (ref 65–99)
HBA1C MFR BLD: 8.7 %
HCT VFR BLD AUTO: 48.4 % (ref 38.5–50)
HDLC SERPL-MCNC: 43 MG/DL
HGB BLD-MCNC: 16.1 G/DL (ref 13.2–17.1)
LDLC SERPL CALC-MCNC: 138 MG/DL (CALC)
LYMPHOCYTES # BLD AUTO: 1825 CELLS/UL (ref 850–3900)
LYMPHOCYTES NFR BLD AUTO: 35.1 %
MCH RBC QN AUTO: 29.4 PG (ref 27–33)
MCHC RBC AUTO-ENTMCNC: 33.3 G/DL (ref 32–36)
MCV RBC AUTO: 88.5 FL (ref 80–100)
MONOCYTES # BLD AUTO: 364 CELLS/UL (ref 200–950)
MONOCYTES NFR BLD AUTO: 7 %
NEUTROPHILS # BLD AUTO: 2798 CELLS/UL (ref 1500–7800)
NEUTROPHILS NFR BLD AUTO: 53.8 %
NONHDLC SERPL-MCNC: 180 MG/DL (CALC)
PLATELET # BLD AUTO: 185 THOUSAND/UL (ref 140–400)
PMV BLD REES-ECKER: 11.9 FL (ref 7.5–12.5)
POTASSIUM SERPL-SCNC: 4.5 MMOL/L (ref 3.5–5.3)
PROT SERPL-MCNC: 7.1 G/DL (ref 6.1–8.1)
RBC # BLD AUTO: 5.47 MILLION/UL (ref 4.2–5.8)
SODIUM SERPL-SCNC: 141 MMOL/L (ref 135–146)
TRIGL SERPL-MCNC: 271 MG/DL
WBC # BLD AUTO: 5.2 THOUSAND/UL (ref 3.8–10.8)

## 2025-07-17 PROCEDURE — 3008F BODY MASS INDEX DOCD: CPT | Performed by: FAMILY MEDICINE

## 2025-07-17 PROCEDURE — 3078F DIAST BP <80 MM HG: CPT | Performed by: FAMILY MEDICINE

## 2025-07-17 PROCEDURE — 3074F SYST BP LT 130 MM HG: CPT | Performed by: FAMILY MEDICINE

## 2025-07-17 PROCEDURE — 99214 OFFICE O/P EST MOD 30 MIN: CPT | Performed by: FAMILY MEDICINE

## 2025-07-17 PROCEDURE — 4010F ACE/ARB THERAPY RXD/TAKEN: CPT | Performed by: FAMILY MEDICINE

## 2025-07-17 RX ORDER — ICOSAPENT ETHYL 1 G/1
2 CAPSULE ORAL 2 TIMES DAILY
Qty: 120 CAPSULE | Refills: 5 | Status: SHIPPED | OUTPATIENT
Start: 2025-07-17

## 2025-07-17 RX ORDER — ATORVASTATIN CALCIUM 80 MG/1
80 TABLET, FILM COATED ORAL NIGHTLY
Qty: 30 TABLET | Refills: 2 | Status: SHIPPED | OUTPATIENT
Start: 2025-07-17

## 2025-07-17 RX ORDER — LOSARTAN POTASSIUM 25 MG/1
25 TABLET ORAL DAILY
Qty: 30 TABLET | Refills: 2 | Status: SHIPPED | OUTPATIENT
Start: 2025-07-17

## 2025-07-17 RX ORDER — DAPAGLIFLOZIN 10 MG/1
10 TABLET, FILM COATED ORAL
Qty: 30 TABLET | Refills: 2 | Status: SHIPPED | OUTPATIENT
Start: 2025-07-17

## 2025-07-17 RX ORDER — FENOFIBRATE 160 MG/1
160 TABLET ORAL DAILY
Qty: 30 TABLET | Refills: 2 | Status: SHIPPED | OUTPATIENT
Start: 2025-07-17

## 2025-07-17 RX ORDER — METFORMIN HYDROCHLORIDE 500 MG/1
TABLET ORAL
Qty: 60 TABLET | Refills: 2 | Status: SHIPPED | OUTPATIENT
Start: 2025-07-17

## 2025-07-17 RX ORDER — ESCITALOPRAM OXALATE 20 MG/1
20 TABLET ORAL DAILY
Qty: 30 TABLET | Refills: 2 | Status: SHIPPED | OUTPATIENT
Start: 2025-07-17

## 2025-07-17 RX ORDER — INSULIN GLARGINE-YFGN 100 [IU]/ML
80 INJECTION, SOLUTION SUBCUTANEOUS EVERY MORNING
Qty: 30 ML | Refills: 2 | Status: SHIPPED | OUTPATIENT
Start: 2025-07-17

## 2025-07-17 RX ORDER — METOPROLOL SUCCINATE 25 MG/1
25 TABLET, EXTENDED RELEASE ORAL DAILY
Qty: 30 TABLET | Refills: 5 | Status: SHIPPED | OUTPATIENT
Start: 2025-07-17

## 2025-07-17 RX ORDER — TRAZODONE HYDROCHLORIDE 50 MG/1
50 TABLET ORAL NIGHTLY
Qty: 30 TABLET | Refills: 2 | Status: SHIPPED | OUTPATIENT
Start: 2025-07-17

## 2025-07-17 ASSESSMENT — ANXIETY QUESTIONNAIRES
IF YOU CHECKED OFF ANY PROBLEMS ON THIS QUESTIONNAIRE, HOW DIFFICULT HAVE THESE PROBLEMS MADE IT FOR YOU TO DO YOUR WORK, TAKE CARE OF THINGS AT HOME, OR GET ALONG WITH OTHER PEOPLE: NOT DIFFICULT AT ALL
5. BEING SO RESTLESS THAT IT IS HARD TO SIT STILL: NOT AT ALL
6. BECOMING EASILY ANNOYED OR IRRITABLE: NOT AT ALL
GAD7 TOTAL SCORE: 0
2. NOT BEING ABLE TO STOP OR CONTROL WORRYING: NOT AT ALL
3. WORRYING TOO MUCH ABOUT DIFFERENT THINGS: NOT AT ALL
1. FEELING NERVOUS, ANXIOUS, OR ON EDGE: NOT AT ALL
4. TROUBLE RELAXING: NOT AT ALL
7. FEELING AFRAID AS IF SOMETHING AWFUL MIGHT HAPPEN: NOT AT ALL

## 2025-07-17 ASSESSMENT — ENCOUNTER SYMPTOMS
POLYPHAGIA: 0
NERVOUS/ANXIOUS: 1
RESTLESSNESS: 0
WEAKNESS: 0
FATIGUE: 0
PANIC: 0
POLYDIPSIA: 0
DEPRESSED MOOD: 0
SHORTNESS OF BREATH: 0

## 2025-07-17 NOTE — PATIENT INSTRUCTIONS
BMI was above normal measurement. Current weight: 111 kg (244 lb)  Weight change since last visit (-) denotes wt loss 11.42 lbs   Weight loss needed to achieve BMI 25: 70.1 Lbs  Weight loss needed to achieve BMI 30: 35.4 Lbs  Advised to Increase physical activity.

## 2025-07-17 NOTE — ASSESSMENT & PLAN NOTE
Diabetes Mellitus type II, under poor control.  1. Rx changes: increase Semglee to 75 units for 3 days and if blood sugar is still high then increase to 80 units daily.  2. Education: Reviewed ‘ABCs’ of diabetes management (respective goals in parentheses):  A1C (<7), blood pressure (<130/80), and cholesterol (LDL <100).  3. Compliance at present is estimated to be poor. Efforts to improve compliance (if necessary) will be directed at dietary modifications: and increased exercise.  4. Follow up: 3 months

## 2025-07-17 NOTE — PROGRESS NOTES
Subjective   Patient ID: Paulo Roman is a 60 y.o. male who presents for Follow-up (Diabetes, Hypertension, Hyperlipidemia, Anxiety, Depression and labs).    Patient is here for follow-up on hypertension and hyperlipidemia. He has no chest pain, dyspnea, exertional chest pressure/discomfort, near-syncope, orthopnea, palpitations, paroxysmal nocturnal dyspnea, and syncope. Taking his medication regularly with no side effects.    Diabetes  He presents for his follow-up diabetic visit. He has type 2 diabetes mellitus. His disease course has been worsening. Hypoglycemia symptoms include nervousness/anxiousness. Pertinent negatives for hypoglycemia include no headaches or tremors. Pertinent negatives for diabetes include no chest pain, no fatigue, no foot paresthesias, no foot ulcerations, no polydipsia, no polyphagia, no polyuria and no weakness. Risk factors for coronary artery disease include diabetes mellitus, hypertension, male sex, obesity and dyslipidemia. He does not see a podiatrist.Eye exam is current.   Anxiety  Presents for follow-up visit. Symptoms include nervous/anxious behavior. Patient reports no chest pain, depressed mood, excessive worry, panic, restlessness, shortness of breath or suicidal ideas. Primary symptoms comment: nightmares. Symptoms occur most days. The severity of symptoms is mild. The patient sleeps 6 hours per night. The quality of sleep is fair. Nighttime awakenings: several.     Compliance with medications is %.        Review of Systems   Constitutional:  Negative for chills, fatigue and fever.   HENT:  Negative for congestion, ear pain, nosebleeds, rhinorrhea and sore throat.    Respiratory:  Negative for cough, shortness of breath and wheezing.    Cardiovascular:  Negative for chest pain and leg swelling.   Gastrointestinal:  Negative for abdominal pain, constipation, diarrhea and vomiting.   Endocrine: Negative for cold intolerance, heat intolerance, polydipsia, polyphagia  "and polyuria.   Genitourinary:  Negative for dysuria, frequency and hematuria.   Neurological:  Negative for tremors, weakness, numbness and headaches.   Hematological:  Negative for adenopathy. Does not bruise/bleed easily.   Psychiatric/Behavioral:  Negative for suicidal ideas. The patient is nervous/anxious.        Objective   /68   Pulse 88   Temp 36.3 °C (97.4 °F)   Resp 16   Ht 1.778 m (5' 10\")   Wt 111 kg (244 lb)   SpO2 95%   BMI 35.01 kg/m²     Physical Exam  Constitutional:       General: He is not in acute distress.     Appearance: Normal appearance.   HENT:      Head: Normocephalic and atraumatic.      Mouth/Throat:      Mouth: Mucous membranes are moist.      Pharynx: Oropharynx is clear. No oropharyngeal exudate or posterior oropharyngeal erythema.     Eyes:      General: No scleral icterus.     Extraocular Movements: Extraocular movements intact.      Pupils: Pupils are equal, round, and reactive to light.       Cardiovascular:      Rate and Rhythm: Normal rate and regular rhythm.      Pulses: Normal pulses.      Heart sounds: No murmur heard.     No friction rub. No gallop.   Pulmonary:      Effort: Pulmonary effort is normal.      Breath sounds: No wheezing, rhonchi or rales.     Musculoskeletal:      Right lower leg: No edema.      Left lower leg: No edema.     Skin:     General: Skin is warm.      Coloration: Skin is not jaundiced or pale.      Findings: No erythema or rash.     Neurological:      General: No focal deficit present.      Mental Status: He is alert and oriented to person, place, and time.      Cranial Nerves: No cranial nerve deficit.      Sensory: No sensory deficit.      Coordination: Coordination normal.      Gait: Gait normal.         Orders Only on 06/30/2025   Component Date Value Ref Range Status    WHITE BLOOD CELL COUNT 07/16/2025 5.2  3.8 - 10.8 Thousand/uL Final    RED BLOOD CELL COUNT 07/16/2025 5.47  4.20 - 5.80 Million/uL Final    HEMOGLOBIN 07/16/2025 " 16.1  13.2 - 17.1 g/dL Final    HEMATOCRIT 07/16/2025 48.4  38.5 - 50.0 % Final    MCV 07/16/2025 88.5  80.0 - 100.0 fL Final    MCH 07/16/2025 29.4  27.0 - 33.0 pg Final    MCHC 07/16/2025 33.3  32.0 - 36.0 g/dL Final    Comment: For adults, a slight decrease in the calculated MCHC  value (in the range of 30 to 32 g/dL) is most likely  not clinically significant; however, it should be  interpreted with caution in correlation with other  red cell parameters and the patient's clinical  condition.      RDW 07/16/2025 13.0  11.0 - 15.0 % Final    PLATELET COUNT 07/16/2025 185  140 - 400 Thousand/uL Final    MPV 07/16/2025 11.9  7.5 - 12.5 fL Final    ABSOLUTE NEUTROPHILS 07/16/2025 2,798  1,500 - 7,800 cells/uL Final    ABSOLUTE LYMPHOCYTES 07/16/2025 1,825  850 - 3,900 cells/uL Final    ABSOLUTE MONOCYTES 07/16/2025 364  200 - 950 cells/uL Final    ABSOLUTE EOSINOPHILS 07/16/2025 172  15 - 500 cells/uL Final    ABSOLUTE BASOPHILS 07/16/2025 42  0 - 200 cells/uL Final    NEUTROPHILS 07/16/2025 53.8  % Final    LYMPHOCYTES 07/16/2025 35.1  % Final    MONOCYTES 07/16/2025 7.0  % Final    EOSINOPHILS 07/16/2025 3.3  % Final    BASOPHILS 07/16/2025 0.8  % Final    GLUCOSE 07/16/2025 105 (H)  65 - 99 mg/dL Final    Comment:               Fasting reference interval     For someone without known diabetes, a glucose value  between 100 and 125 mg/dL is consistent with  prediabetes and should be confirmed with a  follow-up test.         UREA NITROGEN (BUN) 07/16/2025 18  7 - 25 mg/dL Final    CREATININE 07/16/2025 1.07  0.70 - 1.30 mg/dL Final    EGFR 07/16/2025 80  > OR = 60 mL/min/1.73m2 Final    SODIUM 07/16/2025 141  135 - 146 mmol/L Final    POTASSIUM 07/16/2025 4.5  3.5 - 5.3 mmol/L Final    CHLORIDE 07/16/2025 105  98 - 110 mmol/L Final    CARBON DIOXIDE 07/16/2025 27  20 - 32 mmol/L Final    ELECTROLYTE BALANCE 07/16/2025 9  7 - 17 mmol/L (calc) Final    CALCIUM 07/16/2025 9.2  8.6 - 10.3 mg/dL Final    PROTEIN, TOTAL  07/16/2025 7.1  6.1 - 8.1 g/dL Final    ALBUMIN 07/16/2025 4.8  3.6 - 5.1 g/dL Final    BILIRUBIN, TOTAL 07/16/2025 0.5  0.2 - 1.2 mg/dL Final    ALKALINE PHOSPHATASE 07/16/2025 40  35 - 144 U/L Final    AST 07/16/2025 12  10 - 35 U/L Final    ALT 07/16/2025 21  9 - 46 U/L Final    HEMOGLOBIN A1c 07/16/2025 8.7 (H)  <5.7 % Final    Comment: For someone without known diabetes, a hemoglobin A1c  value of 6.5% or greater indicates that they may have   diabetes and this should be confirmed with a follow-up   test.     For someone with known diabetes, a value <7% indicates   that their diabetes is well controlled and a value   greater than or equal to 7% indicates suboptimal   control. A1c targets should be individualized based on   duration of diabetes, age, comorbid conditions, and   other considerations.     Currently, no consensus exists regarding use of  hemoglobin A1c for diagnosis of diabetes for children.          eAG (mg/dL) 07/16/2025 203  mg/dL Final    eAG (mmol/L) 07/16/2025 11.2  mmol/L Final    CHOLESTEROL, TOTAL 07/16/2025 223 (H)  <200 mg/dL Final    HDL CHOLESTEROL 07/16/2025 43  > OR = 40 mg/dL Final    TRIGLYCERIDES 07/16/2025 271 (H)  <150 mg/dL Final    Comment:    If a non-fasting specimen was collected, consider  repeat triglyceride testing on a fasting specimen  if clinically indicated.   Dwight et al. J. of Clin. Lipidol. 2015;9:129-169.         LDL-CHOLESTEROL 07/16/2025 138 (H)  mg/dL (calc) Final    Comment: Reference range: <100     Desirable range <100 mg/dL for primary prevention;    <70 mg/dL for patients with CHD or diabetic patients   with > or = 2 CHD risk factors.     LDL-C is now calculated using the Mateo-Vandana   calculation, which is a validated novel method providing   better accuracy than the Friedewald equation in the   estimation of LDL-C.   Mateo MILLIGAN et al. DAMIR. 2013;310(19): 2921-0523   (http://education.[employer]/faq/WSN495)      CHOL/HDLC RATIO 07/16/2025  5.2 (H)  <5.0 (calc) Final    NON HDL CHOLESTEROL 07/16/2025 180 (H)  <130 mg/dL (calc) Final    Comment: For patients with diabetes plus 1 major ASCVD risk   factor, treating to a non-HDL-C goal of <100 mg/dL   (LDL-C of <70 mg/dL) is considered a therapeutic   option.         Assessment/Plan   Problem List Items Addressed This Visit       Class 2 severe obesity due to excess calories with serious comorbidity and body mass index (BMI) of 35.0 to 35.9 in adult    Continue decrease calorie diet and not more than 1500 calorie per day diet and low-fat diet.  Continue with regular exercise program.  We advised exercise at least 5 days a week for at least 45 minutes and also a minimum of 10,000 steps a day.  The detrimental effects of obesity on health were discussed.         Essential hypertension    Well-controlled, continue current medications and management.         Relevant Medications    losartan (Cozaar) 25 mg tablet    metoprolol succinate XL (Toprol-XL) 25 mg 24 hr tablet    Other Relevant Orders    Comprehensive Metabolic Panel    Follow Up In [facility] - PCP    Hemoglobin A1C    Albumin-Creatinine Ratio, Urine Random    Lipid Panel    Generalized anxiety disorder    Stable, continue current medications and management.         Relevant Medications    escitalopram (Lexapro) 20 mg tablet    Other Relevant Orders    Follow Up In [facility] - PCP    Mixed hyperlipidemia    The nature of cardiac risk has been fully discussed with this patient. Discussed cardiovascular risk analysis and appropriate diet with the need for lifelong measures to reduce the risk. A regular exercise program is recommended to help achieve and maintain normal body weight, fitness and improve lipid balance. Patient education provided. They understand and agree with this course of treatment. They will return with new or worsening symptoms. Patient instructed to remain current with appropriate annual health maintenance.           Relevant Medications    atorvastatin (Lipitor) 80 mg tablet    fenofibrate (Triglide) 160 mg tablet    icosapent ethyL (Vascepa) 1 gram capsule    Other Relevant Orders    Comprehensive Metabolic Panel    Follow Up In [facility] - PCP    Hemoglobin A1C    Albumin-Creatinine Ratio, Urine Random    Lipid Panel    Moderate episode of recurrent major depressive disorder    Stable, continue current medications and management.         Relevant Medications    escitalopram (Lexapro) 20 mg tablet    semaglutide (Rybelsus) 14 mg tablet tablet    traZODone (Desyrel) 50 mg tablet    icosapent ethyL (Vascepa) 1 gram capsule    Other Relevant Orders    Follow Up In [facility] - PCP    Type 2 diabetes mellitus without complication, with long-term current use of insulin - Primary    Diabetes Mellitus type II, under poor control.  1. Rx changes: increase Semglee to 75 units for 3 days and if blood sugar is still high then increase to 80 units daily.  2. Education: Reviewed ‘ABCs’ of diabetes management (respective goals in parentheses):  A1C (<7), blood pressure (<130/80), and cholesterol (LDL <100).  3. Compliance at present is estimated to be poor. Efforts to improve compliance (if necessary) will be directed at dietary modifications: and increased exercise.  4. Follow up: 3 months         Relevant Medications    insulin glargine-yfgn (Semglee,insulin glarg-yfgn,Pen) 100 unit/mL (3 mL) pen    dapagliflozin propanediol (Farxiga) 10 mg tablet    atorvastatin (Lipitor) 80 mg tablet    fenofibrate (Triglide) 160 mg tablet    metFORMIN (Glucophage) 500 mg tablet    losartan (Cozaar) 25 mg tablet    semaglutide (Rybelsus) 14 mg tablet tablet    icosapent ethyL (Vascepa) 1 gram capsule    Other Relevant Orders    Comprehensive Metabolic Panel    Follow Up In [facility] - PCP    Hemoglobin A1C    Albumin-Creatinine Ratio, Urine Random    Lipid Panel     Other Visit Diagnoses         Encounter for  screening for malignant neoplasm of prostate        Relevant Orders    Prostate Specific Antigen          Scribe Attestation  By signing my name below, I, Migel Mullins   attest that this documentation has been prepared under the direction and in the presence of Ez Maier MD.

## 2025-07-19 ASSESSMENT — ENCOUNTER SYMPTOMS
ADENOPATHY: 0
ABDOMINAL PAIN: 0
HEMATURIA: 0
NUMBNESS: 0
FREQUENCY: 0
HEADACHES: 0
TREMORS: 0
FEVER: 0
CHILLS: 0
WHEEZING: 0
VOMITING: 0
SORE THROAT: 0
RHINORRHEA: 0
COUGH: 0
CONSTIPATION: 0
BRUISES/BLEEDS EASILY: 0
DIARRHEA: 0
DYSURIA: 0

## 2025-09-02 ENCOUNTER — TELEPHONE (OUTPATIENT)
Dept: PRIMARY CARE | Facility: CLINIC | Age: 60
End: 2025-09-02
Payer: COMMERCIAL

## 2025-09-02 DIAGNOSIS — Z79.4 TYPE 2 DIABETES MELLITUS WITHOUT COMPLICATION, WITH LONG-TERM CURRENT USE OF INSULIN: ICD-10-CM

## 2025-09-02 DIAGNOSIS — E11.9 TYPE 2 DIABETES MELLITUS WITHOUT COMPLICATION, WITH LONG-TERM CURRENT USE OF INSULIN: ICD-10-CM

## 2025-09-19 ENCOUNTER — APPOINTMENT (OUTPATIENT)
Dept: OPHTHALMOLOGY | Facility: CLINIC | Age: 60
End: 2025-09-19
Payer: COMMERCIAL

## 2025-10-16 ENCOUNTER — APPOINTMENT (OUTPATIENT)
Dept: PRIMARY CARE | Facility: CLINIC | Age: 60
End: 2025-10-16
Payer: COMMERCIAL

## 2026-01-14 ENCOUNTER — APPOINTMENT (OUTPATIENT)
Dept: CARDIOLOGY | Facility: CLINIC | Age: 61
End: 2026-01-14
Payer: COMMERCIAL

## 2026-01-14 ENCOUNTER — APPOINTMENT (OUTPATIENT)
Dept: RADIOLOGY | Facility: CLINIC | Age: 61
End: 2026-01-14
Payer: COMMERCIAL

## 2026-01-22 ENCOUNTER — APPOINTMENT (OUTPATIENT)
Dept: CARDIOLOGY | Facility: CLINIC | Age: 61
End: 2026-01-22
Payer: COMMERCIAL

## (undated) DEVICE — PROBE COVER, ULTRASOUND, 6 X 96, GEL PACKET

## (undated) DEVICE — SUTURE, VICRYL, 3-0, 27 IN, SH

## (undated) DEVICE — Device

## (undated) DEVICE — GLOVE, SURGICAL, PROTEXIS PI W/NEU-THERA, 8.0, PF, LF

## (undated) DEVICE — DRAPE, SHEET, UTILITY, NON ABSORBENT, 18 X 26 IN, LF

## (undated) DEVICE — SYRINGE NEEDLE SET, TB AND BLUNT 22GA

## (undated) DEVICE — DRAIN, WOUND, FLAT, HUBLESS, FULL LENGTH PERFORATION, 10 MM X 20 CM, SILICONE

## (undated) DEVICE — SPONGE, DISSECTOR, PEANUT, 3/8, STERILE 5 FOAM HOLDER"

## (undated) DEVICE — SOLUTION, IRRIGATION, SODIUM CHLORIDE 0.9%, 1000 ML, POUR BOTTLE

## (undated) DEVICE — PACK, UNIVERSAL

## (undated) DEVICE — TIP, SUCTION, YANKAUER, FLEXIBLE

## (undated) DEVICE — DRESSING, GAUZE, PETROLATUM, PATCH, XEROFORM, 1 X 8 IN, STERILE

## (undated) DEVICE — BANDAGE, GAUZE, 6 PLY, KERLIX, 4.5 IN X 4.1 YD, AMD, STERILE

## (undated) DEVICE — GLOVE, SURGICAL, PROTEXIS PI , 7.5, PF, LF

## (undated) DEVICE — SUTURE, ETHILON, 3-0, 18 IN, PS2, BLACK

## (undated) DEVICE — SUTURE, VICRYL, 2-0, 27 IN, SH, UNDYED

## (undated) DEVICE — TOWEL PACK, STERILE, 4/PACK, BLUE

## (undated) DEVICE — BLADE, ULTRA CLEAN, BOVIE MOD TIP, 1IN

## (undated) DEVICE — DRESSING, GAUZE, SUPER KERLIX, 6X6

## (undated) DEVICE — APPLICATOR, CHLORAPREP, W/ORANGE TINT, 26ML

## (undated) DEVICE — NEEDLE, SAFETY, 18 G X 1.5 IN

## (undated) DEVICE — SOLUTION, IRRIGATION, STERILE WATER, 1000 ML, POUR BOTTLE